# Patient Record
Sex: MALE | Race: WHITE | NOT HISPANIC OR LATINO
[De-identification: names, ages, dates, MRNs, and addresses within clinical notes are randomized per-mention and may not be internally consistent; named-entity substitution may affect disease eponyms.]

---

## 2017-01-12 ENCOUNTER — APPOINTMENT (OUTPATIENT)
Dept: INTERNAL MEDICINE | Facility: CLINIC | Age: 67
End: 2017-01-12

## 2017-01-12 VITALS
DIASTOLIC BLOOD PRESSURE: 80 MMHG | BODY MASS INDEX: 24.01 KG/M2 | WEIGHT: 182 LBS | TEMPERATURE: 98.4 F | SYSTOLIC BLOOD PRESSURE: 122 MMHG | HEART RATE: 84 BPM

## 2017-01-13 ENCOUNTER — APPOINTMENT (OUTPATIENT)
Dept: DERMATOLOGY | Facility: CLINIC | Age: 67
End: 2017-01-13

## 2017-01-13 ENCOUNTER — LABORATORY RESULT (OUTPATIENT)
Age: 67
End: 2017-01-13

## 2017-01-13 VITALS
HEART RATE: 74 BPM | SYSTOLIC BLOOD PRESSURE: 142 MMHG | HEIGHT: 73 IN | WEIGHT: 180 LBS | BODY MASS INDEX: 23.86 KG/M2 | OXYGEN SATURATION: 97 % | TEMPERATURE: 98 F | DIASTOLIC BLOOD PRESSURE: 74 MMHG

## 2017-01-13 DIAGNOSIS — D48.9 NEOPLASM OF UNCERTAIN BEHAVIOR, UNSPECIFIED: ICD-10-CM

## 2017-02-14 ENCOUNTER — APPOINTMENT (OUTPATIENT)
Dept: INTERNAL MEDICINE | Facility: CLINIC | Age: 67
End: 2017-02-14

## 2017-02-14 VITALS
HEIGHT: 73 IN | DIASTOLIC BLOOD PRESSURE: 62 MMHG | SYSTOLIC BLOOD PRESSURE: 118 MMHG | TEMPERATURE: 98 F | WEIGHT: 178 LBS | BODY MASS INDEX: 23.59 KG/M2 | HEART RATE: 72 BPM

## 2017-02-14 DIAGNOSIS — Z87.2 PERSONAL HISTORY OF DISEASES OF THE SKIN AND SUBCUTANEOUS TISSUE: ICD-10-CM

## 2017-04-06 ENCOUNTER — RESULT REVIEW (OUTPATIENT)
Age: 67
End: 2017-04-06

## 2017-04-06 ENCOUNTER — LABORATORY RESULT (OUTPATIENT)
Age: 67
End: 2017-04-06

## 2017-04-07 ENCOUNTER — APPOINTMENT (OUTPATIENT)
Dept: DERMATOLOGY | Facility: CLINIC | Age: 67
End: 2017-04-07

## 2017-04-07 VITALS
SYSTOLIC BLOOD PRESSURE: 132 MMHG | DIASTOLIC BLOOD PRESSURE: 72 MMHG | BODY MASS INDEX: 23.59 KG/M2 | HEIGHT: 73 IN | WEIGHT: 178 LBS

## 2017-04-25 ENCOUNTER — APPOINTMENT (OUTPATIENT)
Dept: INTERNAL MEDICINE | Facility: CLINIC | Age: 67
End: 2017-04-25

## 2017-04-25 VITALS
SYSTOLIC BLOOD PRESSURE: 112 MMHG | TEMPERATURE: 98.5 F | DIASTOLIC BLOOD PRESSURE: 76 MMHG | WEIGHT: 188 LBS | BODY MASS INDEX: 24.8 KG/M2 | HEART RATE: 70 BPM

## 2017-04-25 LAB
BASOPHILS # BLD AUTO: 0.03 K/UL
BASOPHILS NFR BLD AUTO: 0.3 %
EOSINOPHIL # BLD AUTO: 0.12 K/UL
EOSINOPHIL NFR BLD AUTO: 1.2 %
HBA1C MFR BLD HPLC: 6.3 %
HCT VFR BLD CALC: 48.8 %
HGB BLD-MCNC: 16 G/DL
IMM GRANULOCYTES NFR BLD AUTO: 0.3 %
LYMPHOCYTES # BLD AUTO: 1.69 K/UL
LYMPHOCYTES NFR BLD AUTO: 16.9 %
MAN DIFF?: NORMAL
MCHC RBC-ENTMCNC: 29.6 PG
MCHC RBC-ENTMCNC: 32.8 GM/DL
MCV RBC AUTO: 90.2 FL
MONOCYTES # BLD AUTO: 0.8 K/UL
MONOCYTES NFR BLD AUTO: 8 %
NEUTROPHILS # BLD AUTO: 7.35 K/UL
NEUTROPHILS NFR BLD AUTO: 73.3 %
PLATELET # BLD AUTO: 231 K/UL
RBC # BLD: 5.41 M/UL
RBC # FLD: 14.6 %
WBC # FLD AUTO: 10.02 K/UL

## 2017-04-26 LAB
ALBUMIN SERPL ELPH-MCNC: 4.4 G/DL
ALP BLD-CCNC: 123 U/L
ALT SERPL-CCNC: 16 U/L
ANION GAP SERPL CALC-SCNC: 15 MMOL/L
AST SERPL-CCNC: 24 U/L
BILIRUB SERPL-MCNC: 0.4 MG/DL
BUN SERPL-MCNC: 15 MG/DL
CALCIUM SERPL-MCNC: 9.4 MG/DL
CHLORIDE SERPL-SCNC: 102 MMOL/L
CHOLEST SERPL-MCNC: 218 MG/DL
CHOLEST/HDLC SERPL: 3.3 RATIO
CO2 SERPL-SCNC: 25 MMOL/L
CREAT SERPL-MCNC: 1.23 MG/DL
FRUCTOSAMINE SERPL-MCNC: 246 UMOL/L
GLUCOSE SERPL-MCNC: 107 MG/DL
HDLC SERPL-MCNC: 67 MG/DL
LDLC SERPL CALC-MCNC: 128 MG/DL
MEV IGG FLD QL IA: >300 AU/ML
MEV IGG+IGM SER-IMP: POSITIVE
MUV AB SER-ACNC: POSITIVE
MUV IGG SER QL IA: 47.4 AU/ML
POTASSIUM SERPL-SCNC: 5 MMOL/L
PROLACTIN SERPL-MCNC: 4.8 NG/ML
PROT SERPL-MCNC: 7.3 G/DL
RUBV IGG FLD-ACNC: <0.1 INDEX
RUBV IGG SER-IMP: NEGATIVE
SODIUM SERPL-SCNC: 142 MMOL/L
TRIGL SERPL-MCNC: 117 MG/DL
VZV AB TITR SER: POSITIVE
VZV IGG SER IF-ACNC: 2446 INDEX

## 2017-04-28 LAB
ADJUSTED MITOGEN: >10 IU/ML
ADJUSTED TB AG: 0 IU/ML
M TB IFN-G BLD-IMP: NEGATIVE
QUANTIFERON GOLD NIL: 0.02 IU/ML

## 2017-06-14 ENCOUNTER — APPOINTMENT (OUTPATIENT)
Dept: INTERNAL MEDICINE | Facility: CLINIC | Age: 67
End: 2017-06-14

## 2017-06-14 VITALS
HEART RATE: 68 BPM | SYSTOLIC BLOOD PRESSURE: 118 MMHG | WEIGHT: 178 LBS | HEIGHT: 73 IN | TEMPERATURE: 98.8 F | BODY MASS INDEX: 23.59 KG/M2 | DIASTOLIC BLOOD PRESSURE: 78 MMHG

## 2017-07-14 ENCOUNTER — APPOINTMENT (OUTPATIENT)
Dept: DERMATOLOGY | Facility: CLINIC | Age: 67
End: 2017-07-14

## 2017-07-27 ENCOUNTER — APPOINTMENT (OUTPATIENT)
Dept: RHEUMATOLOGY | Facility: CLINIC | Age: 67
End: 2017-07-27

## 2017-09-11 ENCOUNTER — RX RENEWAL (OUTPATIENT)
Age: 67
End: 2017-09-11

## 2017-09-13 ENCOUNTER — APPOINTMENT (OUTPATIENT)
Dept: INTERNAL MEDICINE | Facility: CLINIC | Age: 67
End: 2017-09-13
Payer: MEDICARE

## 2017-09-13 VITALS
HEIGHT: 73 IN | DIASTOLIC BLOOD PRESSURE: 70 MMHG | WEIGHT: 185 LBS | SYSTOLIC BLOOD PRESSURE: 130 MMHG | OXYGEN SATURATION: 97 % | BODY MASS INDEX: 24.52 KG/M2 | TEMPERATURE: 98 F | HEART RATE: 73 BPM

## 2017-09-13 PROCEDURE — 99214 OFFICE O/P EST MOD 30 MIN: CPT

## 2017-10-12 ENCOUNTER — APPOINTMENT (OUTPATIENT)
Dept: DERMATOLOGY | Facility: CLINIC | Age: 67
End: 2017-10-12
Payer: MEDICARE

## 2017-10-12 VITALS
TEMPERATURE: 98.1 F | DIASTOLIC BLOOD PRESSURE: 84 MMHG | HEIGHT: 73 IN | HEART RATE: 62 BPM | BODY MASS INDEX: 24.92 KG/M2 | SYSTOLIC BLOOD PRESSURE: 157 MMHG | WEIGHT: 188 LBS | OXYGEN SATURATION: 97 %

## 2017-10-12 DIAGNOSIS — D22.9 MELANOCYTIC NEVI, UNSPECIFIED: ICD-10-CM

## 2017-10-12 PROCEDURE — 99213 OFFICE O/P EST LOW 20 MIN: CPT

## 2017-10-12 RX ORDER — CLOBETASOL PROPIONATE 0.5 MG/G
0.05 CREAM TOPICAL
Qty: 1 | Refills: 2 | Status: DISCONTINUED | COMMUNITY
Start: 2017-01-13 | End: 2017-10-12

## 2017-10-12 RX ORDER — CLOBETASOL PROPIONATE 0.5 MG/ML
0.05 SOLUTION TOPICAL
Qty: 1 | Refills: 3 | Status: DISCONTINUED | COMMUNITY
Start: 2017-01-13 | End: 2017-10-12

## 2017-12-04 ENCOUNTER — APPOINTMENT (OUTPATIENT)
Dept: INTERNAL MEDICINE | Facility: CLINIC | Age: 67
End: 2017-12-04
Payer: MEDICARE

## 2017-12-04 VITALS
SYSTOLIC BLOOD PRESSURE: 110 MMHG | TEMPERATURE: 97.6 F | WEIGHT: 183 LBS | DIASTOLIC BLOOD PRESSURE: 70 MMHG | HEIGHT: 73 IN | OXYGEN SATURATION: 97 % | HEART RATE: 72 BPM | BODY MASS INDEX: 24.25 KG/M2

## 2017-12-04 PROCEDURE — 82270 OCCULT BLOOD FECES: CPT

## 2017-12-04 PROCEDURE — 99214 OFFICE O/P EST MOD 30 MIN: CPT | Mod: 25

## 2017-12-04 PROCEDURE — 93000 ELECTROCARDIOGRAM COMPLETE: CPT | Mod: 59

## 2017-12-04 PROCEDURE — G0439: CPT

## 2017-12-04 PROCEDURE — 69209 REMOVE IMPACTED EAR WAX UNI: CPT

## 2017-12-05 ENCOUNTER — TRANSCRIPTION ENCOUNTER (OUTPATIENT)
Age: 67
End: 2017-12-05

## 2017-12-05 LAB
25(OH)D3 SERPL-MCNC: 27.8 NG/ML
ALBUMIN SERPL ELPH-MCNC: 4.8 G/DL
ALP BLD-CCNC: 113 U/L
ALT SERPL-CCNC: 20 U/L
ANION GAP SERPL CALC-SCNC: 14 MMOL/L
APPEARANCE: ABNORMAL
AST SERPL-CCNC: 20 U/L
BACTERIA: NEGATIVE
BASOPHILS # BLD AUTO: 0.02 K/UL
BASOPHILS NFR BLD AUTO: 0.2 %
BILIRUB SERPL-MCNC: 0.6 MG/DL
BILIRUBIN URINE: NEGATIVE
BLOOD URINE: NEGATIVE
BUN SERPL-MCNC: 16 MG/DL
CALCIUM SERPL-MCNC: 9.8 MG/DL
CHLORIDE SERPL-SCNC: 101 MMOL/L
CHOLEST SERPL-MCNC: 243 MG/DL
CHOLEST/HDLC SERPL: 3.6 RATIO
CO2 SERPL-SCNC: 26 MMOL/L
COLOR: YELLOW
CREAT SERPL-MCNC: 1.23 MG/DL
CREAT SPEC-SCNC: 180 MG/DL
EOSINOPHIL # BLD AUTO: 0.11 K/UL
EOSINOPHIL NFR BLD AUTO: 1.3 %
FOLATE SERPL-MCNC: 8.7 NG/ML
FRUCTOSAMINE SERPL-MCNC: 259 UMOL/L
GLUCOSE QUALITATIVE U: NEGATIVE MG/DL
GLUCOSE SERPL-MCNC: 126 MG/DL
HBA1C MFR BLD HPLC: 6 %
HCT VFR BLD CALC: 49 %
HDLC SERPL-MCNC: 67 MG/DL
HGB BLD-MCNC: 17.1 G/DL
HYALINE CASTS: 2 /LPF
IMM GRANULOCYTES NFR BLD AUTO: 0.2 %
KETONES URINE: NEGATIVE
LDLC SERPL CALC-MCNC: 144 MG/DL
LEUKOCYTE ESTERASE URINE: NEGATIVE
LYMPHOCYTES # BLD AUTO: 1.82 K/UL
LYMPHOCYTES NFR BLD AUTO: 21.6 %
MAN DIFF?: NORMAL
MCHC RBC-ENTMCNC: 32.1 PG
MCHC RBC-ENTMCNC: 34.9 GM/DL
MCV RBC AUTO: 91.9 FL
MICROALBUMIN 24H UR DL<=1MG/L-MCNC: 2.1 MG/DL
MICROALBUMIN/CREAT 24H UR-RTO: 12 MG/G
MICROSCOPIC-UA: NORMAL
MONOCYTES # BLD AUTO: 0.67 K/UL
MONOCYTES NFR BLD AUTO: 7.9 %
NEUTROPHILS # BLD AUTO: 5.79 K/UL
NEUTROPHILS NFR BLD AUTO: 68.8 %
NITRITE URINE: NEGATIVE
PH URINE: 5.5
PLATELET # BLD AUTO: 174 K/UL
POTASSIUM SERPL-SCNC: 4.2 MMOL/L
PROLACTIN SERPL-MCNC: 6 NG/ML
PROT SERPL-MCNC: 7.8 G/DL
PROTEIN URINE: 30 MG/DL
PSA SERPL-MCNC: 2.64 NG/ML
RBC # BLD: 5.33 M/UL
RBC # FLD: 14.9 %
RED BLOOD CELLS URINE: 4 /HPF
SODIUM SERPL-SCNC: 141 MMOL/L
SPECIFIC GRAVITY URINE: 1.02
SQUAMOUS EPITHELIAL CELLS: 1 /HPF
TRIGL SERPL-MCNC: 159 MG/DL
UROBILINOGEN URINE: NEGATIVE MG/DL
VIT B12 SERPL-MCNC: 292 PG/ML
WBC # FLD AUTO: 8.43 K/UL
WHITE BLOOD CELLS URINE: 2 /HPF

## 2017-12-10 LAB — VIT B1 SERPL-MCNC: 172.9 NMOL/L

## 2017-12-19 ENCOUNTER — APPOINTMENT (OUTPATIENT)
Dept: INTERNAL MEDICINE | Facility: CLINIC | Age: 67
End: 2017-12-19
Payer: MEDICARE

## 2017-12-19 VITALS
SYSTOLIC BLOOD PRESSURE: 108 MMHG | DIASTOLIC BLOOD PRESSURE: 66 MMHG | TEMPERATURE: 97.4 F | HEART RATE: 68 BPM | BODY MASS INDEX: 24.52 KG/M2 | HEIGHT: 73 IN | WEIGHT: 185 LBS | OXYGEN SATURATION: 98 %

## 2017-12-19 PROCEDURE — 99214 OFFICE O/P EST MOD 30 MIN: CPT | Mod: 25

## 2017-12-19 PROCEDURE — 96372 THER/PROPH/DIAG INJ SC/IM: CPT

## 2017-12-19 RX ORDER — CYANOCOBALAMIN 1000 UG/ML
1000 INJECTION INTRAMUSCULAR; SUBCUTANEOUS
Qty: 0 | Refills: 0 | Status: COMPLETED | OUTPATIENT
Start: 2017-12-19

## 2017-12-19 RX ADMIN — CYANOCOBALAMIN 0 MCG/ML: 1000 INJECTION INTRAMUSCULAR; SUBCUTANEOUS at 00:00

## 2017-12-28 ENCOUNTER — APPOINTMENT (OUTPATIENT)
Dept: INTERNAL MEDICINE | Facility: CLINIC | Age: 67
End: 2017-12-28
Payer: MEDICARE

## 2017-12-28 VITALS
TEMPERATURE: 97.7 F | BODY MASS INDEX: 24.52 KG/M2 | SYSTOLIC BLOOD PRESSURE: 110 MMHG | HEIGHT: 73 IN | WEIGHT: 185 LBS | DIASTOLIC BLOOD PRESSURE: 72 MMHG | HEART RATE: 64 BPM | OXYGEN SATURATION: 98 %

## 2017-12-28 PROCEDURE — 96372 THER/PROPH/DIAG INJ SC/IM: CPT

## 2017-12-28 RX ORDER — CYANOCOBALAMIN 1000 UG/ML
1000 INJECTION INTRAMUSCULAR; SUBCUTANEOUS
Qty: 0 | Refills: 0 | Status: COMPLETED | OUTPATIENT
Start: 2017-12-28

## 2017-12-28 RX ADMIN — CYANOCOBALAMIN 0 MCG/ML: 1000 INJECTION INTRAMUSCULAR; SUBCUTANEOUS at 00:00

## 2018-01-03 ENCOUNTER — APPOINTMENT (OUTPATIENT)
Dept: INTERNAL MEDICINE | Facility: CLINIC | Age: 68
End: 2018-01-03
Payer: MEDICARE

## 2018-01-03 VITALS
HEIGHT: 73 IN | WEIGHT: 188 LBS | TEMPERATURE: 97.7 F | DIASTOLIC BLOOD PRESSURE: 60 MMHG | OXYGEN SATURATION: 96 % | SYSTOLIC BLOOD PRESSURE: 110 MMHG | HEART RATE: 63 BPM | BODY MASS INDEX: 24.92 KG/M2

## 2018-01-03 PROCEDURE — 96372 THER/PROPH/DIAG INJ SC/IM: CPT

## 2018-01-03 RX ORDER — CYANOCOBALAMIN 1000 UG/ML
1000 INJECTION INTRAMUSCULAR; SUBCUTANEOUS
Qty: 0 | Refills: 0 | Status: COMPLETED | OUTPATIENT
Start: 2018-01-03

## 2018-01-03 RX ADMIN — CYANOCOBALAMIN 0 MCG/ML: 1000 INJECTION INTRAMUSCULAR; SUBCUTANEOUS at 00:00

## 2018-01-10 ENCOUNTER — APPOINTMENT (OUTPATIENT)
Dept: INTERNAL MEDICINE | Facility: CLINIC | Age: 68
End: 2018-01-10

## 2018-01-18 ENCOUNTER — APPOINTMENT (OUTPATIENT)
Dept: DERMATOLOGY | Facility: CLINIC | Age: 68
End: 2018-01-18
Payer: MEDICARE

## 2018-01-18 VITALS
DIASTOLIC BLOOD PRESSURE: 85 MMHG | SYSTOLIC BLOOD PRESSURE: 151 MMHG | HEIGHT: 73 IN | OXYGEN SATURATION: 97 % | TEMPERATURE: 97.9 F | HEART RATE: 64 BPM | WEIGHT: 193 LBS | BODY MASS INDEX: 25.58 KG/M2

## 2018-01-18 PROCEDURE — 99213 OFFICE O/P EST LOW 20 MIN: CPT

## 2018-01-18 RX ORDER — HALOBETASOL PROPIONATE 0.5 MG/G
0.05 OINTMENT TOPICAL TWICE DAILY
Qty: 2 | Refills: 3 | Status: DISCONTINUED | COMMUNITY
Start: 2017-10-12 | End: 2018-01-18

## 2018-01-22 ENCOUNTER — APPOINTMENT (OUTPATIENT)
Dept: UROLOGY | Facility: CLINIC | Age: 68
End: 2018-01-22
Payer: MEDICARE

## 2018-01-22 VITALS
TEMPERATURE: 98.1 F | HEART RATE: 78 BPM | HEIGHT: 73 IN | BODY MASS INDEX: 25.58 KG/M2 | SYSTOLIC BLOOD PRESSURE: 122 MMHG | WEIGHT: 193 LBS | RESPIRATION RATE: 16 BRPM | DIASTOLIC BLOOD PRESSURE: 80 MMHG

## 2018-01-22 PROCEDURE — 99203 OFFICE O/P NEW LOW 30 MIN: CPT

## 2018-01-22 PROCEDURE — 51798 US URINE CAPACITY MEASURE: CPT

## 2018-01-23 ENCOUNTER — APPOINTMENT (OUTPATIENT)
Dept: INTERNAL MEDICINE | Facility: CLINIC | Age: 68
End: 2018-01-23
Payer: MEDICARE

## 2018-01-23 VITALS
OXYGEN SATURATION: 98 % | HEIGHT: 73 IN | BODY MASS INDEX: 24.65 KG/M2 | TEMPERATURE: 97.5 F | WEIGHT: 186 LBS | HEART RATE: 63 BPM | DIASTOLIC BLOOD PRESSURE: 70 MMHG | SYSTOLIC BLOOD PRESSURE: 124 MMHG

## 2018-01-23 PROCEDURE — 96372 THER/PROPH/DIAG INJ SC/IM: CPT

## 2018-01-23 PROCEDURE — 99214 OFFICE O/P EST MOD 30 MIN: CPT | Mod: 25

## 2018-01-23 RX ORDER — CYANOCOBALAMIN 1000 UG/ML
1000 INJECTION INTRAMUSCULAR; SUBCUTANEOUS
Qty: 0 | Refills: 0 | Status: COMPLETED | OUTPATIENT
Start: 2018-01-23

## 2018-01-23 RX ADMIN — CYANOCOBALAMIN 0 MCG/ML: 1000 INJECTION INTRAMUSCULAR; SUBCUTANEOUS at 00:00

## 2018-01-25 LAB
APPEARANCE: ABNORMAL
BACTERIA UR CULT: NORMAL
BACTERIA: NEGATIVE
BILIRUBIN URINE: NEGATIVE
BLOOD URINE: NEGATIVE
CALCIUM OXALATE CRYSTALS: ABNORMAL
COLOR: YELLOW
GLUCOSE QUALITATIVE U: NEGATIVE MG/DL
KETONES URINE: ABNORMAL
LEUKOCYTE ESTERASE URINE: NEGATIVE
MICROSCOPIC-UA: NORMAL
NITRITE URINE: NEGATIVE
PH URINE: 5
PROTEIN URINE: NEGATIVE MG/DL
RED BLOOD CELLS URINE: 1 /HPF
SPECIFIC GRAVITY URINE: 1.02
SQUAMOUS EPITHELIAL CELLS: 1 /HPF
URINE COMMENTS: NORMAL
UROBILINOGEN URINE: NEGATIVE MG/DL
WHITE BLOOD CELLS URINE: 1 /HPF

## 2018-01-30 ENCOUNTER — FORM ENCOUNTER (OUTPATIENT)
Age: 68
End: 2018-01-30

## 2018-01-31 ENCOUNTER — OUTPATIENT (OUTPATIENT)
Dept: OUTPATIENT SERVICES | Facility: HOSPITAL | Age: 68
LOS: 1 days | End: 2018-01-31
Payer: MEDICARE

## 2018-01-31 ENCOUNTER — APPOINTMENT (OUTPATIENT)
Dept: ULTRASOUND IMAGING | Facility: HOSPITAL | Age: 68
End: 2018-01-31
Payer: MEDICARE

## 2018-01-31 DIAGNOSIS — K74.0 HEPATIC FIBROSIS: ICD-10-CM

## 2018-01-31 PROCEDURE — 76700 US EXAM ABDOM COMPLETE: CPT | Mod: 26

## 2018-01-31 PROCEDURE — 71046 X-RAY EXAM CHEST 2 VIEWS: CPT

## 2018-01-31 PROCEDURE — 71046 X-RAY EXAM CHEST 2 VIEWS: CPT | Mod: 26

## 2018-01-31 PROCEDURE — 76700 US EXAM ABDOM COMPLETE: CPT

## 2018-03-21 ENCOUNTER — APPOINTMENT (OUTPATIENT)
Dept: INTERNAL MEDICINE | Facility: CLINIC | Age: 68
End: 2018-03-21
Payer: MEDICARE

## 2018-03-21 VITALS
HEIGHT: 73 IN | WEIGHT: 194 LBS | DIASTOLIC BLOOD PRESSURE: 76 MMHG | TEMPERATURE: 97.6 F | BODY MASS INDEX: 25.71 KG/M2 | HEART RATE: 65 BPM | OXYGEN SATURATION: 98 % | SYSTOLIC BLOOD PRESSURE: 120 MMHG

## 2018-03-21 DIAGNOSIS — Z79.899 OTHER LONG TERM (CURRENT) DRUG THERAPY: ICD-10-CM

## 2018-03-21 PROCEDURE — 99214 OFFICE O/P EST MOD 30 MIN: CPT

## 2018-04-19 ENCOUNTER — APPOINTMENT (OUTPATIENT)
Dept: DERMATOLOGY | Facility: CLINIC | Age: 68
End: 2018-04-19
Payer: MEDICARE

## 2018-04-19 VITALS
BODY MASS INDEX: 24.52 KG/M2 | WEIGHT: 185 LBS | HEART RATE: 57 BPM | OXYGEN SATURATION: 94 % | DIASTOLIC BLOOD PRESSURE: 78 MMHG | SYSTOLIC BLOOD PRESSURE: 146 MMHG | HEIGHT: 73 IN

## 2018-04-19 DIAGNOSIS — L90.5 SCAR CONDITIONS AND FIBROSIS OF SKIN: ICD-10-CM

## 2018-04-19 PROCEDURE — 99213 OFFICE O/P EST LOW 20 MIN: CPT | Mod: 25

## 2018-04-19 PROCEDURE — 17000 DESTRUCT PREMALG LESION: CPT

## 2018-05-29 ENCOUNTER — APPOINTMENT (OUTPATIENT)
Dept: CARDIOLOGY | Facility: CLINIC | Age: 68
End: 2018-05-29
Payer: MEDICARE

## 2018-05-29 VITALS
OXYGEN SATURATION: 96 % | WEIGHT: 185 LBS | SYSTOLIC BLOOD PRESSURE: 142 MMHG | HEART RATE: 72 BPM | TEMPERATURE: 98.1 F | HEIGHT: 73 IN | BODY MASS INDEX: 24.52 KG/M2 | DIASTOLIC BLOOD PRESSURE: 88 MMHG

## 2018-05-29 PROCEDURE — 99204 OFFICE O/P NEW MOD 45 MIN: CPT

## 2018-05-29 PROCEDURE — 93000 ELECTROCARDIOGRAM COMPLETE: CPT

## 2018-06-20 ENCOUNTER — APPOINTMENT (OUTPATIENT)
Dept: INTERNAL MEDICINE | Facility: CLINIC | Age: 68
End: 2018-06-20
Payer: MEDICARE

## 2018-06-20 VITALS
HEART RATE: 76 BPM | SYSTOLIC BLOOD PRESSURE: 142 MMHG | HEIGHT: 73 IN | TEMPERATURE: 97.8 F | BODY MASS INDEX: 25.05 KG/M2 | WEIGHT: 189 LBS | OXYGEN SATURATION: 96 % | DIASTOLIC BLOOD PRESSURE: 76 MMHG

## 2018-06-20 VITALS — SYSTOLIC BLOOD PRESSURE: 130 MMHG | DIASTOLIC BLOOD PRESSURE: 70 MMHG

## 2018-06-20 PROCEDURE — 99214 OFFICE O/P EST MOD 30 MIN: CPT

## 2018-06-20 NOTE — HISTORY OF PRESENT ILLNESS
[FreeTextEntry1] : psoriasis and hypertension [de-identified] : pt has seen dermatologist and cardiologist and i appreciate their notes .  pt has been having hypertension and is not following low salt diet.  he doesn’t have headaches, dizziness , chest pain , sob, or palpitation.  His psoriasis is well controlled.

## 2018-06-20 NOTE — ASSESSMENT
[FreeTextEntry1] : htn low salt diet.  \par DASH stands for Dietary Approaches to Stop Hypertension. The DASH diet can help lower high blood pressure and cholesterol and other fats in your blood. It can help lower your risk for heart attack and stroke and help you lose weight. This diet is low in sodium (salt) and rich in nutrients.\par \par \par \par \par \par How DASH Works\par \par \par \par \par \par \par The DASH diet reduces high blood pressure by lowering the amount of sodium in your diet to 2300 milligrams (mg) a day. Lowering sodium to 1500 mg a day reduces blood pressure even more. It also includes variety of foods rich in nutrients that help lower blood pressure, such as potassium, calcium, and magnesium.\par \par On the DASH diet, you will:\par •Get plenty of vegetables, fruits, and fat-free or low-fat dairy\par •Include whole grains, beans, seeds, nuts, and vegetable oils\par •Eat lean meats, poultry, and fish\par •Cut back on salt, red meat, sweets, and sugary drinks\par •Limit alcoholic beverages \par \par You should also get at least 30 minutes of moderate-intensity exercise most days of the week. Examples include brisk walking or riding a bike. Aim to get 2 hours and 30 minutes of exercise per week.\par \par You can follow the DASH diet if you want to prevent high blood pressure. It can also help you lose extra weight. Most people can benefit from lowering sodium intake to 2300 milligrams (mg) a day.\par \par Your health care provider may suggest cutting back to 1500 mg a day if you:\par •Already have high blood pressure\par •Have diabetes or chronic kidney disease\par •Are \par •Are age 51 or older \par \par If you take medicine to treat high blood pressure, do not stop taking your medicine while on the DASH diet. Be sure to tell your provider you are following the DASH diet.\par \par \par \par \par \par How to get Started\par \par \par \par \par \par \par On the DASH diet, you can eat foods from all food groups. But you eat more of the foods that are naturally low in salt, cholesterol, and saturated fats. You will also include foods that are high in potassium, calcium, magnesium, and fiber.\par \par Here's a list of the food groups and how many servings of each you should have per day. For a diet that has 2000 calories per day, you should eat:\par •Vegetables (4 to 5 servings a day)\par •Fruits (4 to 5 servings a day)\par •Low-fat or fat-free dairy products, such as milk and yogurt (2 to 3 servings a day)\par •Whole grains (7 to 8 servings a day, and 3 should be whole grains)\par •Fish, lean meats, and poultry (2 servings or less a day)\par •Beans, seeds, and nuts (4 to 5 servings a week)\par •Fats and oils (2 to 3 servings a day)\par •Sweets or added sugars, such as jelly, hard candy, maple syrup, sorbet, and sugar (fewer than 5 servings a week) \par \par The number of servings you have each day depends on how many calories you need.\par •If you're trying to lose weight, you may need fewer servings than listed.\par •If you are not very active, aim for the lower number of servings listed.\par •If you are moderately active, have the higher number of servings.\par •If you are very active, you may need more servings than listed.\par \par Your provider can help find the right number of servings a day for you.\par \par \par \par \par \par Know your Serving Sizes\par \par \par \par \par \par \par To know how much to eat, you need to know serving sizes. Below are sample servings for each food group.\par \par Vegetables:\par •1 cup (70 grams) raw leafy vegetables\par •½ cup (90 grams) chopped raw or cooked vegetables \par \par Fruits:\par •1 medium fruit (6 ounces or 168 grams)\par •½ cup (70 grams) fresh, frozen, or canned fruit\par •¼ cup (25 grams) dried fruit \par \par Fat-free or low-fat dairy products:\par •1 cup (240 milliliters) milk or yogurt\par •1½ ounce (oz) or 50 grams (g) cheese \par \par Whole grains (Aim to make all of your grain choices whole grain. Whole grain products contain more fiber and protein than "refined" grain products.):\par •1 slice bread\par •½ cup (80 grams) cooked rice, pasta, or cereal \par \par Lean meats, poultry, and fish:\par •3 oz (85 g) of cooked fish, lean meat, or poultry \par \par Nuts, seeds, and legumes:\par •½ cup (90 grams) cooked legumes (dried beans, peas)\par •1/3 cup (45 grams) nuts\par •1 tablespoon (10 grams) seeds \par \par Fats and oils:\par •1 teaspoon (5 milliliters) vegetable oil\par •2 tablespoons (30 grams) low-fat salad dressing\par •1 teaspoon (5 grams) soft margarine \par \par Sweets and added sugars:\par •1 tablespoon (15 grams) sugar\par •1 tablespoon (15 grams) jelly or jam\par •½ cup (70 grams) sorbet, gelatin dessert \par \par \par \par \par \par Tips for Following DASH\par \par \par \par \par \par \par It's easy to follow the DASH diet. But it might mean making some changes to how you currently eat. To get started:\par •DO NOT try to make changes all at once. It's fine to change your eating habits gradually.\par •To add vegetables to your diet, try having a salad at lunch. Or, add cucumber, lettuce, shredded carrots, or tomatoes to your sandwiches.\par •There should always been something green on your plate. It's fine to use frozen vegetables instead of fresh. Just make sure the package does not contain added salt or fat.\par •Add sliced fruit to your cereal or oatmeal for breakfast.\par •For dessert, choose fresh fruit or low-fat frozen yogurt instead of high-calorie sweets, such as cakes or pies.\par •Choose healthy snacks, such as unsalted rice cakes or popcorn, raw vegetables, or yogurt. Dried fruits, seeds, and nuts also make great snack choices. Just keep these portions small.\par •Think of meat as part of your meal, instead of the main course. Limit your servings of lean meat to 6 ounces (170 grams) a day. You can have two 3-ounce (85 grams) servings during the day.\par •Try cooking without meat at least twice each week. Instead, eat beans, nuts, tofu, or eggs for your protein.\par \par \par \par \par \par Tips to Lower Your Salt\par \par \par \par \par \par \par To lower the amount of salt in your diet:\par •Take the saltshaker off the table.\par •Flavor your food with herbs and spices instead of salt. Lemon, lime, and vinegar also add flavor.\par •Avoid canned foods and frozen entrees. They are often high in salt. When you make things from scratch you have more control over how much salt goes in them.\par •Check all food labels for sodium. You may be surprised at how much you find, and where you find it. Frozen dinners, soups, salad dressings, and prepared foods often have a lot of sodium.\par •Choose foods that contain less than 5% for the daily value of sodium.\par •Look for low-sodium versions of foods when you can find them.\par •Limit foods and condiments that have a lot of salt, such as pickles, olives, cured meats, ketchup, soy sauce, mustard, and barbeque sauce.\par •When dining out, ask that your food be made with no added salt or MSG. \par \par \par \par \par \par Where to get More Information\par \par \par \par \par \par \par There are many books about the DASH diet plan to help you get started. These books can also provide sample meal plans and recipe ideas.\par dm - will recheck his levels  he also has proteinuria and will repeat.   hyperlipidemia will recheck .  Psoriasis marked improvement.

## 2018-06-20 NOTE — PHYSICAL EXAM
[No Acute Distress] : no acute distress [Well Nourished] : well nourished [Well Developed] : well developed [Well-Appearing] : well-appearing [Normal Sclera/Conjunctiva] : normal sclera/conjunctiva [PERRL] : pupils equal round and reactive to light [EOMI] : extraocular movements intact [Normal Outer Ear/Nose] : the outer ears and nose were normal in appearance [Normal Oropharynx] : the oropharynx was normal [No JVD] : no jugular venous distention [Supple] : supple [No Lymphadenopathy] : no lymphadenopathy [No Respiratory Distress] : no respiratory distress  [Clear to Auscultation] : lungs were clear to auscultation bilaterally [No Accessory Muscle Use] : no accessory muscle use [Normal Rate] : normal rate  [Regular Rhythm] : with a regular rhythm [Normal S1, S2] : normal S1 and S2 [No Carotid Bruits] : no carotid bruits [No Abdominal Bruit] : a ~M bruit was not heard ~T in the abdomen [No Varicosities] : no varicosities [Pedal Pulses Present] : the pedal pulses are present [No Edema] : there was no peripheral edema [No Extremity Clubbing/Cyanosis] : no extremity clubbing/cyanosis [No Palpable Aorta] : no palpable aorta [Soft] : abdomen soft [Non Tender] : non-tender [Non-distended] : non-distended [No Masses] : no abdominal mass palpated [No HSM] : no HSM [Normal Bowel Sounds] : normal bowel sounds [Normal Posterior Cervical Nodes] : no posterior cervical lymphadenopathy [Normal Anterior Cervical Nodes] : no anterior cervical lymphadenopathy [No CVA Tenderness] : no CVA  tenderness [No Spinal Tenderness] : no spinal tenderness [No Joint Swelling] : no joint swelling [Grossly Normal Strength/Tone] : grossly normal strength/tone [Normal Gait] : normal gait [Coordination Grossly Intact] : coordination grossly intact [No Focal Deficits] : no focal deficits [Deep Tendon Reflexes (DTR)] : deep tendon reflexes were 2+ and symmetric [Normal Affect] : the affect was normal [Normal Insight/Judgement] : insight and judgment were intact [de-identified] : marked improvement of skin lesions  of psoriasis .

## 2018-06-20 NOTE — COUNSELING
[Healthy eating counseling provided] : healthy eating [Low Fat Diet] : Low fat diet [Low Salt Diet] : Low salt diet [___ min/wk activity recommended] : [unfilled] min/wk activity recommended [Sleep ___ hours/day] : Sleep [unfilled] hours/day [None] : None [Good understanding] : Patient has a good understanding of lifestyle changes and the steps needed to achieve self management goals

## 2018-06-21 LAB
25(OH)D3 SERPL-MCNC: 28.7 NG/ML
ALBUMIN SERPL ELPH-MCNC: 4.5 G/DL
ALP BLD-CCNC: 85 U/L
ALT SERPL-CCNC: 15 U/L
ANION GAP SERPL CALC-SCNC: 15 MMOL/L
APPEARANCE: CLEAR
AST SERPL-CCNC: 19 U/L
BACTERIA: NEGATIVE
BASOPHILS # BLD AUTO: 0.01 K/UL
BASOPHILS NFR BLD AUTO: 0.1 %
BILIRUB SERPL-MCNC: 0.6 MG/DL
BILIRUBIN URINE: NEGATIVE
BLOOD URINE: NEGATIVE
BUN SERPL-MCNC: 14 MG/DL
CALCIUM SERPL-MCNC: 9.6 MG/DL
CHLORIDE SERPL-SCNC: 105 MMOL/L
CHOLEST SERPL-MCNC: 220 MG/DL
CHOLEST/HDLC SERPL: 3.8 RATIO
CO2 SERPL-SCNC: 23 MMOL/L
COLOR: YELLOW
CREAT SERPL-MCNC: 1.36 MG/DL
CRP SERPL-MCNC: 0.3 MG/DL
EOSINOPHIL # BLD AUTO: 0.1 K/UL
EOSINOPHIL NFR BLD AUTO: 1.3 %
ERYTHROCYTE [SEDIMENTATION RATE] IN BLOOD BY WESTERGREN METHOD: 5 MM/HR
FOLATE SERPL-MCNC: 5.5 NG/ML
FRUCTOSAMINE SERPL-MCNC: 238 UMOL/L
GLUCOSE QUALITATIVE U: NEGATIVE MG/DL
GLUCOSE SERPL-MCNC: 118 MG/DL
HBA1C MFR BLD HPLC: 6.3 %
HCT VFR BLD CALC: 51.1 %
HDLC SERPL-MCNC: 58 MG/DL
HGB BLD-MCNC: 16.3 G/DL
HYALINE CASTS: 1 /LPF
IMM GRANULOCYTES NFR BLD AUTO: 0.3 %
KETONES URINE: NEGATIVE
LDLC SERPL CALC-MCNC: 135 MG/DL
LEUKOCYTE ESTERASE URINE: NEGATIVE
LYMPHOCYTES # BLD AUTO: 1.83 K/UL
LYMPHOCYTES NFR BLD AUTO: 23.2 %
MAN DIFF?: NORMAL
MCHC RBC-ENTMCNC: 30.2 PG
MCHC RBC-ENTMCNC: 31.9 GM/DL
MCV RBC AUTO: 94.6 FL
MICROSCOPIC-UA: NORMAL
MONOCYTES # BLD AUTO: 0.65 K/UL
MONOCYTES NFR BLD AUTO: 8.2 %
NEUTROPHILS # BLD AUTO: 5.28 K/UL
NEUTROPHILS NFR BLD AUTO: 66.9 %
NITRITE URINE: NEGATIVE
PH URINE: 5
PLATELET # BLD AUTO: 163 K/UL
POTASSIUM SERPL-SCNC: 5 MMOL/L
PROT SERPL-MCNC: 7.4 G/DL
PROTEIN URINE: NEGATIVE MG/DL
RBC # BLD: 5.4 M/UL
RBC # FLD: 15 %
RED BLOOD CELLS URINE: 2 /HPF
SODIUM SERPL-SCNC: 143 MMOL/L
SPECIFIC GRAVITY URINE: 1.02
SQUAMOUS EPITHELIAL CELLS: 0 /HPF
TRIGL SERPL-MCNC: 134 MG/DL
UROBILINOGEN URINE: NEGATIVE MG/DL
VIT B12 SERPL-MCNC: 391 PG/ML
WBC # FLD AUTO: 7.89 K/UL
WHITE BLOOD CELLS URINE: 1 /HPF

## 2018-07-31 ENCOUNTER — APPOINTMENT (OUTPATIENT)
Dept: DERMATOLOGY | Facility: CLINIC | Age: 68
End: 2018-07-31

## 2018-09-20 ENCOUNTER — APPOINTMENT (OUTPATIENT)
Dept: INTERNAL MEDICINE | Facility: CLINIC | Age: 68
End: 2018-09-20
Payer: MEDICARE

## 2018-09-20 VITALS
HEART RATE: 62 BPM | DIASTOLIC BLOOD PRESSURE: 70 MMHG | SYSTOLIC BLOOD PRESSURE: 120 MMHG | OXYGEN SATURATION: 97 % | BODY MASS INDEX: 24.78 KG/M2 | HEIGHT: 73 IN | WEIGHT: 187 LBS | TEMPERATURE: 97.8 F

## 2018-09-20 DIAGNOSIS — Z87.448 PERSONAL HISTORY OF OTHER DISEASES OF URINARY SYSTEM: ICD-10-CM

## 2018-09-20 PROCEDURE — 99214 OFFICE O/P EST MOD 30 MIN: CPT

## 2018-09-20 RX ORDER — SECUKINUMAB 150 MG/ML
150 INJECTION SUBCUTANEOUS
Qty: 1 | Refills: 0 | Status: COMPLETED | COMMUNITY
Start: 2017-09-13 | End: 2018-09-20

## 2018-09-20 NOTE — COUNSELING
[Healthy eating counseling provided] : healthy eating [Low Salt Diet] : Low salt diet [Walking] : Walking [Sleep ___ hours/day] : Sleep [unfilled] hours/day [Engage in a relaxing activity] : Engage in a relaxing activity [Plan in advance] : Plan in advance [None] : None [Good understanding] : Patient has a good understanding of lifestyle changes and the steps needed to achieve self management goals

## 2018-09-20 NOTE — PHYSICAL EXAM

## 2018-09-20 NOTE — ASSESSMENT
[FreeTextEntry1] : renal insuff Pt has not gone for his sonogram or nephrologist if his creatinine is high he will go .  He has changed h is salt intake and his bp has been well controlled.  he is drinking enough fluids.  He has lost 2 lbs and eating healthy.   He will have platelets checked and b12 and vit d.  dm controlled will check his blood today.

## 2018-09-20 NOTE — HISTORY OF PRESENT ILLNESS
[FreeTextEntry1] : hypertension psoriasis  [de-identified] : Pt is doing well on medication and his kin has improved  he is following his low salt diet.  he has decreased his drinking alcohol.  He doesn’t have chest pain sob, headaches, or dizziness.

## 2018-09-20 NOTE — HEALTH RISK ASSESSMENT
[] : No [No falls in past year] : Patient reported no falls in the past year [0] : 2) Feeling down, depressed, or hopeless: Not at all (0) [[id number]] : 0

## 2018-09-21 LAB
25(OH)D3 SERPL-MCNC: 28.9 NG/ML
ALBUMIN SERPL ELPH-MCNC: 4.5 G/DL
ALP BLD-CCNC: 86 U/L
ALT SERPL-CCNC: 20 U/L
ANION GAP SERPL CALC-SCNC: 13 MMOL/L
APPEARANCE: CLEAR
AST SERPL-CCNC: 22 U/L
BACTERIA: NEGATIVE
BASOPHILS # BLD AUTO: 0.02 K/UL
BASOPHILS NFR BLD AUTO: 0.2 %
BILIRUB SERPL-MCNC: 0.5 MG/DL
BILIRUBIN URINE: NEGATIVE
BLOOD URINE: NEGATIVE
BUN SERPL-MCNC: 17 MG/DL
CALCIUM SERPL-MCNC: 9.7 MG/DL
CHLORIDE SERPL-SCNC: 103 MMOL/L
CHOLEST SERPL-MCNC: 203 MG/DL
CHOLEST/HDLC SERPL: 3.9 RATIO
CO2 SERPL-SCNC: 27 MMOL/L
COLOR: YELLOW
CREAT SERPL-MCNC: 1.21 MG/DL
CREAT SPEC-SCNC: 207 MG/DL
EOSINOPHIL # BLD AUTO: 0.08 K/UL
EOSINOPHIL NFR BLD AUTO: 0.9 %
FOLATE SERPL-MCNC: 7 NG/ML
FRUCTOSAMINE SERPL-MCNC: 246 UMOL/L
GLUCOSE QUALITATIVE U: NEGATIVE MG/DL
GLUCOSE SERPL-MCNC: 116 MG/DL
HBA1C MFR BLD HPLC: 6.3 %
HCT VFR BLD CALC: 49.5 %
HDLC SERPL-MCNC: 52 MG/DL
HGB BLD-MCNC: 16.4 G/DL
HYALINE CASTS: 1 /LPF
IMM GRANULOCYTES NFR BLD AUTO: 0.2 %
KETONES URINE: NEGATIVE
LDLC SERPL CALC-MCNC: 122 MG/DL
LEUKOCYTE ESTERASE URINE: NEGATIVE
LYMPHOCYTES # BLD AUTO: 1.89 K/UL
LYMPHOCYTES NFR BLD AUTO: 21 %
MAN DIFF?: NORMAL
MCHC RBC-ENTMCNC: 30.8 PG
MCHC RBC-ENTMCNC: 33.1 GM/DL
MCV RBC AUTO: 93 FL
MICROALBUMIN 24H UR DL<=1MG/L-MCNC: 2 MG/DL
MICROALBUMIN/CREAT 24H UR-RTO: 10 MG/G
MICROSCOPIC-UA: NORMAL
MONOCYTES # BLD AUTO: 0.64 K/UL
MONOCYTES NFR BLD AUTO: 7.1 %
NEUTROPHILS # BLD AUTO: 6.36 K/UL
NEUTROPHILS NFR BLD AUTO: 70.6 %
NITRITE URINE: NEGATIVE
PH URINE: 5.5
PLATELET # BLD AUTO: 202 K/UL
POTASSIUM SERPL-SCNC: 4.4 MMOL/L
PROT SERPL-MCNC: 7.4 G/DL
PROTEIN URINE: 30 MG/DL
RBC # BLD: 5.32 M/UL
RBC # FLD: 14.2 %
RED BLOOD CELLS URINE: 3 /HPF
SODIUM SERPL-SCNC: 143 MMOL/L
SPECIFIC GRAVITY URINE: 1.02
SQUAMOUS EPITHELIAL CELLS: 1 /HPF
TRIGL SERPL-MCNC: 145 MG/DL
UROBILINOGEN URINE: NEGATIVE MG/DL
VIT B12 SERPL-MCNC: 397 PG/ML
WBC # FLD AUTO: 9.01 K/UL
WHITE BLOOD CELLS URINE: 1 /HPF

## 2018-09-25 ENCOUNTER — APPOINTMENT (OUTPATIENT)
Dept: DERMATOLOGY | Facility: CLINIC | Age: 68
End: 2018-09-25
Payer: MEDICARE

## 2018-09-25 VITALS
OXYGEN SATURATION: 98 % | BODY MASS INDEX: 24.78 KG/M2 | WEIGHT: 187 LBS | HEIGHT: 73 IN | TEMPERATURE: 98.2 F | HEART RATE: 77 BPM | SYSTOLIC BLOOD PRESSURE: 123 MMHG | DIASTOLIC BLOOD PRESSURE: 79 MMHG

## 2018-09-25 DIAGNOSIS — Z12.83 ENCOUNTER FOR SCREENING FOR MALIGNANT NEOPLASM OF SKIN: ICD-10-CM

## 2018-09-25 DIAGNOSIS — D22.9 MELANOCYTIC NEVI, UNSPECIFIED: ICD-10-CM

## 2018-09-25 PROCEDURE — 99214 OFFICE O/P EST MOD 30 MIN: CPT | Mod: 25

## 2018-09-25 PROCEDURE — 17000 DESTRUCT PREMALG LESION: CPT

## 2018-11-29 ENCOUNTER — APPOINTMENT (OUTPATIENT)
Dept: DERMATOLOGY | Facility: CLINIC | Age: 68
End: 2018-11-29
Payer: MEDICARE

## 2018-11-29 VITALS
TEMPERATURE: 97.8 F | SYSTOLIC BLOOD PRESSURE: 143 MMHG | BODY MASS INDEX: 24.78 KG/M2 | HEART RATE: 74 BPM | DIASTOLIC BLOOD PRESSURE: 70 MMHG | OXYGEN SATURATION: 95 % | WEIGHT: 187 LBS | HEIGHT: 73 IN

## 2018-11-29 PROCEDURE — 99214 OFFICE O/P EST MOD 30 MIN: CPT | Mod: 25,GC

## 2018-11-29 PROCEDURE — 17000 DESTRUCT PREMALG LESION: CPT | Mod: GC

## 2018-11-29 PROCEDURE — 17003 DESTRUCT PREMALG LES 2-14: CPT | Mod: GC

## 2018-12-20 ENCOUNTER — APPOINTMENT (OUTPATIENT)
Dept: INTERNAL MEDICINE | Facility: CLINIC | Age: 68
End: 2018-12-20
Payer: MEDICARE

## 2018-12-20 VITALS
TEMPERATURE: 97.9 F | HEIGHT: 73 IN | DIASTOLIC BLOOD PRESSURE: 72 MMHG | BODY MASS INDEX: 24.78 KG/M2 | OXYGEN SATURATION: 97 % | HEART RATE: 65 BPM | SYSTOLIC BLOOD PRESSURE: 122 MMHG | WEIGHT: 187 LBS

## 2018-12-20 DIAGNOSIS — R29.810 FACIAL WEAKNESS: ICD-10-CM

## 2018-12-20 DIAGNOSIS — Z78.9 OTHER SPECIFIED HEALTH STATUS: ICD-10-CM

## 2018-12-20 PROCEDURE — 99212 OFFICE O/P EST SF 10 MIN: CPT | Mod: 25

## 2018-12-20 PROCEDURE — G0439: CPT

## 2018-12-20 PROCEDURE — 69209 REMOVE IMPACTED EAR WAX UNI: CPT

## 2018-12-20 PROCEDURE — 82270 OCCULT BLOOD FECES: CPT

## 2018-12-20 NOTE — ASSESSMENT
[FreeTextEntry1] : health maintenance He needs shingles new vaccine and flu.  We discussed vaccines.  BMi 24 continue healthy eating and low salt diet.  He is up to date with colonoscopy . he needs bone density fu.  he is up to date with eye and dental exam.   basal cell ca resolved abcde of skin care continue fu with dermatologist.  fibrosis of liver fu with hepatologist and fibroscan .  He has bx in past  and was due to methotrexate usage and alcohol use.  he no longer drinks and has been off methotrexate for 4 yrs.  \par DM he has elevated hgbA1c 6.3 % and has improved his diet and no longer diabetic but prediabetic.  Pt adenoma reducing in  size.  continue surveillance.  Bone density needed last 5 yrs ago.  impacted ear wax multiple cleansing after instillation of debrox with removal of wax.  raynauds The Raynaud phenomenon (RP) is an exaggerated vascular response to cold temperature or emotional stress. The phenomenon is manifested clinically by sharply demarcated color changes of the skin of the digits. Abnormal vasoconstriction of digital arteries and cutaneous arterioles due to a local defect in normal vascular responses is thought to underlie the disorder.\par \par RP is considered primary if these symptoms occur alone without evidence of any associated disorder. By comparison, secondary RP refers to the presence of the disorder in association with a related illness, such as systemic lupus erythematosus and systemic sclerosis (SSc; scleroderma).\par \par The clinical manifestations and diagnosis of the Raynaud phenomenon are reviewed here. The pathogenesis and treatment of this disorder are presented separately. (See "Pathogenesis of the Raynaud phenomenon" and "Initial treatment of the Raynaud phenomenon" and "Treatment of the Raynaud phenomenon resistant to initial therapy".)\par \par EPIDEMIOLOGY — Establishing the true prevalence of Raynaud phenomenon (RP) is hampered by the lack of a well-defined reproducible "gold standard" diagnostic test. Cold hands and feet are a normal physiological response to cold exposure that, to preserve heat, cools the surface skin and causes skin color changes; whereas RP is an exaggerated vascular response to cold temperature or emotional stress. Although survey criteria vary, most investigators agree that a history of at least two color changes (pallor and cyanosis) after cold exposure is necessary for a definite diagnosis.\par \par Community-based surveys have been performed to estimate the prevalence of RP in the general population. In these surveys, estimates of the prevalence of RP have ranged from 3 to 20 percent in women and 3 to 14 percent in men.\par \par These relatively wide ranges reflect, in part, the populations that are studied [1-10]:\par \par ?RP is more common in [place] (17 percent) than in the [place] (5 percent) [1].\par \par ?A survey performed by the author in an -American community found a prevalence of 3 percent [7].\par \par ?As reported in a community survey of 1525 participants, the prevalence of primary RP was approximately 11 and 9 percent in women and men, respectively [8].\par \par \par RP is more common among young women, younger age groups, and family members of patients with RP [11]. RP often starts early in life, but the prevalence then appears to remain relatively constant throughout life [12]. A review found a 0.1 to 1 percent prevalence of new-onset RP among individuals over the age of 60 years [13]. Additionally, the prevalence of RP found in a given population is influenced by the climate of the region studied [14].\par \par PRIMARY AND SECONDARY RAYNAUD PHENOMENON — Patients with Raynaud phenomenon (RP) are described as having either a primary or secondary process.\par \par Primary Raynaud phenomenon — Primary RP or idiopathic Raynaud disease are terms to describe those patients without a definable cause for their vascular events. In this setting, RP is considered to be an exaggeration of normal vasoconstriction to cold exposure (see "Pathogenesis of the Raynaud phenomenon"). Most investigators feel the term "disease" is inappropriate and prefer using the term primary RP for otherwise healthy individuals.\par \par Primary RP usually has an age of onset between 15 and 30 years of age, is more common in women, and may occur in multiple family members [11]. Studies of RP in children are limited, but, while primary RP often begins in the teenage years, careful assessment for secondary causes is most important [9,10,15,16]. Although patients with primary RP are generally otherwise healthy, comorbid conditions can occur that may aggravate attacks. These include hypertension, atherosclerosis, cardiovascular disease, and diabetes mellitus.\par \par There are conditions that are associated with RP, but it is unclear if they are pathologically linked, as is seen in various causes of secondary RP. There is a reported increase in prevalence of RP among patients with fibromyalgia syndrome [17,18]. These surveys may have included subjects with secondary disease states. One survey found no association between skin temperature and digital color changes in patients with fibromyalgia, suggesting a unique cause for RP in fibromyalgia compared with patients with primary RP [19].\par \par Similarly, there are several surveys that support an association of migraine headaches and RP [20-23]. However, it is not established that RP and the migraine headache share a common cause or mechanism of vascular dysfunction.\par \par Secondary Raynaud phenomenon — Secondary RP refers to those patients with RP in whom an associated disease or cause may underlie the attacks [24]. Other investigators prefer the term Raynaud syndrome [25].\par \par Since a variety of possible insults can disrupt the normal complex regulation of regional blood flow to the digits and skin, the number of diseases and exposures associated with secondary RP is extensive (table 1):\par \par ?Diseases commonly associated with RP include autoimmune rheumatic diseases such as systemic sclerosis (SSc; scleroderma), systemic lupus erythematosus, mixed connective tissue disease, Sjögren's syndrome and dermatomyositis/polymyositis.\par \par \par ?Various drugs or toxins can also precipitate or exacerbate RP such as amphetamines and chemotherapeutic agents (especially cisplatin and bleomycin) [26].\par \par \par ?Hematologic abnormalities associated with RP include cryoglobulinemia, cold agglutinin disease, paraproteinemia, POEMS (Polyneuropathy, Organomegaly, Endocrinopathy, Monoclonal gammopathy, and Skin changes) syndrome, and cryofibrinogenemia.\par \par \par ?Occupational and environmental causes of RP include vascular trauma (eg, injury to the distal ulnar artery in the hypothenar hammer syndrome) [27-31], the use of vibrating tools [32-35], frostbite [36], and carpal tunnel syndrome [37].\par \par \par ?Hypothyroidism may predispose to RP, and improvement of cold induced vasospasm may occur with thyroid hormone replacement. Vasculitis may cause ischemic digits, but the absence of preceding cold-induced and reversible color changes help distinguish this from true RP.\par \par \par Since primary RP is common in the general population, some reported associations of RP have not been proven. As an example, the prevalence of RP in rheumatoid arthritis is controversial. Some surveys suggest no increase in risk [38,39], but other studies have found that 17 to 22 percent of patients with rheumatoid arthritis had RP [40,41]. Those with RP were more likely to have vasculitis or severe disease [40,41].\par \par The reported frequencies and rates of development of a disease or disorder to which RP is secondary among patients initially thought to have primary RP vary. A meta-analysis of 10 articles including approximately 640 patients diagnosed with primary RP found that 81 (13 percent) patients eventually developed an autoimmune rheumatic disease [42]. In a longitudinal study including 307 patients with RP, the annual incidence of transition from presumed primary RP to secondary RP was 1 percent [43]. Other large cohorts of patients with an initial presentation suggesting primary RP reported that between 16 and 37 percent transition to secondary RP during the follow-up period [44,45].\par \par CLINICAL MANIFESTATIONS — Both primary and secondary Raynaud phenomenon (RP) most often affect the hands [46-49]. Attacks also frequently occur in the toes, but patients are less likely to complain of lower extremity symptoms.\par \par With both primary and secondary RP, a typical episode is characterized by the sudden onset of cold fingers (or toes) in association with sharply demarcated color changes of skin pallor (white attack) due to constricted blood flow, followed by cyanotic skin (blue attack) which indicates tissue hypoxia (picture 1). With rewarming, the ischemic phase (white or blue attack) usually lasts for 15 to 20 minutes. The skin subsequently blushes upon recovery, thereby resulting in the erythema of reperfusion.\par \par A Raynaud attack typically begins in a single finger and then spreads to other digits symmetrically in both hands. The index, middle, and ring finger are the most frequently involved digits, while the thumb is often spared entirely [50]. Involvement of the thumb may indicate a secondary cause of RP [51]. Cutaneous vasospasm is also common at other sites, including the skin of the ears, nose, face, knees, and nipples. A common pathophysiologic feature of affected areas is the presence of thermoregulatory vessels. (See "Common problems of breastfeeding and weaning", section on 'Nipple vasoconstriction'.)\par \par Symptoms of low blood flow and ischemia — The symptoms of RP may include complaints resulting from low blood flow or ischemia. As an example, mild RP may be associated with sensations of pins and needles, numbness and/or clumsiness of the hand, and finger aching. The signs of mild RP should be completely reversible on rewarming or reduction of stress. Less severe disease is most commonly observed in those with primary RP.\par \par Mild to severe symptoms and signs of ischemia can be observed in patients with secondary RP. In severe secondary RP, for example, pain or ulceration of the skin (typically the tips of the fingers and toes) may result from critical tissue ischemia.\par \par Livedo reticularis — During a cold response, patients with RP may exhibit livedo reticularis; this is a violaceous mottling or reticular pattern of the skin of the arms and legs, sometimes with regular unbroken circles. In those with primary RP, this finding is benign and completely reversible with rewarming.\par \par By comparison, nonreversible livedo reticularis may be observed in patients with vasculitis, occlusive vascular disease (eg, due to atheroemboli or thrombosis), or antiphospholipid syndrome. These cutaneous changes may also be associated with irregular broken circles. Due to these differences, some experts advocate use of the term "livedo racemosa" rather than "livedo reticularis" for these cutaneous findings in patients with inflammatory or thrombotic vascular disease [52]. (See "Clinical manifestations of antiphospholipid syndrome" and "Overview of cutaneous lupus erythematosus".)\par \par Provoking factors — Patients with both primary and secondary RP note that exposure to cold temperature triggers RP. More importantly, the provocation occurs during relative shifts from warmer to cooler temperatures. As a result, mild cold exposures such as air conditioning or the cold of the refrigerated food section of the grocery store may cause an attack. Although attacks occur locally in the fingers, a general body chill will also trigger an episode, even if the hands or feet areas are kept warm. To avoid RP, therefore, patients must maintain whole body warmth.\par \par An attack of RP may also occur after stimulation of the sympathetic nervous system (such as emotional stress, sudden startling). It is not uncommon for the clinician to witness a typical attack during the first encounter with a frightened or nervous patient.\par \par Course — Spontaneous remission of primary RP may occur. This was illustrated in a prospective study that surveyed a middle-aged white population over a seven-year period [8]. For those who had, or who developed, primary RP during that time frame, remissions occurred in 64 percent of both women and men. However, some symptoms suggestive of RP continued in about 20 percent of the cases that were classified as a "remission."\par \par By comparison, remission is uncommon in patients with secondary RP.\par \par EVALUATION AND DIAGNOSIS OF RAYNAUD PHENOMENON — Our approach to the evaluation and diagnosis of Raynaud phenomenon (RP) is summarized in an algorithm (algorithm 1).\par \par Our evaluation begins with the following three screening questions for RP [46,49]:\par \par ?Are your fingers unusually sensitive to cold?\par \par ?Do your fingers change color when they are exposed to cold temperatures?\par \par ?Do your fingers turn white, blue, or both?\par \par \par We diagnose RP if the patient has a positive response to all three questions. There are no simple office tests or standard diagnostic criteria that are commonly used to diagnose RP [24,46,53-55]. Attempts to induce an attack, such as a cold water challenge, are NOT recommended since the responses are inconsistent even in those with definite RP.\par \par DISTINGUISHING PRIMARY FROM SECONDARY RP — Although extensive special testing is not always necessary, every patient with a diagnosis of Raynaud phenomenon (RP) should be carefully evaluated to distinguish primary from the secondary disorder.\par \par History and physical examination — The evaluation begins with an attempt to identify a secondary cause of RP (table 1). A careful history should include the age of onset, involved digits, degree of symmetry and severity of the attacks, and the presence of digital ulcerations.\par \par Patients should also be asked about potential exposures or aggravating factors including:\par \par ?Occupational and environmental factors – Polyvinyl chloride, frostbite, hand-arm vibration, hypothenar hammer syndrome\par \par ?Drugs or toxins – Chemotherapeutic agents (especially cisplatin and bleomycin) [26], interferon [56], estrogen, nicotine, narcotics, sympathomimetic agents, ergotamines, clonidine\par \par ?Neurological and/or occupational conditions – Carpal tunnel syndrome\par \par \par It was initially thought that nonselective beta blockers could cause RP [57]. However, subsequent studies suggested that beta blockers did not induce vasoconstriction in patients with RP [58]. The exact impact of beta blockers on RP is still unresolved in that they have intrinsic sympathomimetic activity as well as vasodilatory properties [26].\par \par Other systemic symptoms that may be suggestive of an autoimmune rheumatic disease include fever, arthralgias/arthritis, myalgias, dysphagia, skin rashes, and cardiopulmonary abnormalities. (See 'Secondary Raynaud phenomenon' above.)\par \par A physical examination should include an evaluation for findings suggestive of tissue ischemia (eg, ulcerations) of the hands and feet. Other sites that may be subject to possible cutaneous vasospasm include the skin of the ears, nose, face, knees, and nipples. If sclerodactyly is present, a workup for systemic sclerosis (SSc) should be initiated. A complete physical exam should also include a thorough musculoskeletal and cardiopulmonary evaluation, as possible clues that an underlying autoimmune rheumatic disease may be present.\par \par Diagnostic tools — Nailfold capillaroscopy is the method most commonly used in clinical practice to help distinguish patients with primary RP from those with secondary RP. Other techniques are also available but they are typically used in specialty centers or for research purposes.\par \par Nailfold capillary microscopy — Nailfold capillary microscopy is performed by dropping oil on the periungual area and examining with an ophthalmoscope set at diopter 40 or with a dissecting microscope. A specialized handheld dermatoscope that magnifies a view of the area is also available. High-magnification nailfold videocapillaroscopy is used in some specialty centers [59]. Enlarged or distorted capillary loops and/or dropout or loss of loops suggest an underlying (or an increased likelihood of developing) autoimmune rheumatic disease [42,60-63]. If the enlargement is associated with loss of capillaries, then the patient is more likely to have or develop SSc (picture 2) [61,64-66]. (See "Overview of the clinical manifestations of systemic sclerosis (scleroderma) in adults".)\par \par Other techniques — Other tools used to assess the vascular responses in the digits and skin include thermography, angiography, laser Doppler imaging, and direct measures of skin temperature and local blood flow [67,68]. Nailfold capillaroscopy, thermal imaging, and laser Doppler imaging each independently provide good discrimination between healthy controls and primary RP [69]. The response of the digital systolic blood pressure to cooling is associated with the RP attack frequency and has been used in clinical trials as a measure of RP severity [70,71]. These laboratory-based tools are useful for studying the physiology of RP but may not predict response to therapy or the severity of RP in an ambulatory setting.\par \par Primary Raynaud phenomenon — We diagnose primary RP if the results of a thorough history and physical examination, including nailfold capillary microscopy, do not suggest secondary RP.\par \par A tentative diagnosis of primary RP can also be made if the history and physical examination does not suggest a secondary cause but the clinician cannot perform nailfold capillary microscopy. Among such patients, there is typically no need for further specialized testing. However, we recognize that serological testing such as antinuclear antibody (DIMA) is often done in this clinical setting. A weakly positive DIMA (<1/640) is common in the general population; therefore, a positive test must be interpreted in the context of the entire clinical situation. (See "Measurement and clinical significance of antinuclear antibodies".)\par \par Careful follow-up is necessary to ensure that signs and/or symptoms of secondary causes of RP do not emerge. Most studies find that a transition to a defined secondary autoimmune rheumatic disease usually occurs within two to three years of presentation with RP. Among those initially diagnosed with primary RP, the presence of autoantibodies and/or abnormalities of nailfold capillary microscopy may help predict of the development of secondary RP. The following examples are illustrative [43-45,72-75]:\par \par ?A meta-analysis of 10 articles including approximately 640 patients diagnosed with primary RP found that an abnormal nailfold capillary pattern was the best predictor of an eventual disease transition, which occurred approximately 10.4 years (a range of 0.6 to 27.9 years) after the onset of RP [42].\par \par \par ?A six-year study evaluated the efficacy of DIMA and anticentromere and antitopoisomerase (anti-Scl-70) testing in predicting the subsequent development of an autoimmune rheumatic disease among 85 patients initially referred for RP [73]. Upon initial examination, 30 and 18 of these patients were considered to have primary RP and possible autoimmune rheumatic disease (defined as one suggestive symptom), respectively. Patients were subsequently examined every two years, with blood drawn and the sera stored. Among these individuals, the presence of an DIMA was significantly associated with the eventual development of an autoimmune rheumatic disorder over the six-year follow-up period. In addition, the prognostic value of a positive anticentromere antibody was 60 percent sensitive and 98 percent specific for the development of the CREST syndrome (Calcinosis cutis, Raynaud phenomenon, Esophageal dysmotility, Sclerodactyly, and Telangiectasia) or limited skin variant of scleroderma, and a positive anti-Scl-70 antibody was 38 and 100 percent sensitive and specific, respectively, for the development of SSc or probable SSc. (See "Diagnosis and differential diagnosis of systemic sclerosis (scleroderma) in adults", section on 'Laboratory testing'.)\par \par \par ?The combination of autoantibodies and nailfold capillary microscopy may be more informative than either test alone. In a prospective study of 586 patients with RP who had no known autoimmune rheumatic disease initially, the overall incidence of limited (CREST variant) or diffuse SSc after follow-up of 3197 person years was 13 percent [75]. The presence of one or more SSc-specific autoantibody (anticentromere [anti-CENP-B], anti-Th/To, antitopoisomerase I [Scl-70], and anti-RNA polymerase III) or abnormal nailfold capillary microscopy was found in 47 percent of patients that progressed to definite SSc. In those with both an autoantibody and abnormal nailfold capillary microscopy, the incidence of definite SSc was nearly 80 percent.\par \par \par Secondary Raynaud phenomenon — Patients with a moderate or high clinical suspicion of a secondary cause of RP may have the following clinical features [44,76,77]:\par \par ?Later age of onset (greater than 40 years)\par \par ?History of a known precipitant\par \par ?Male gender\par \par ?Painful severe events with tissue sign of ischemia (ulceration)\par \par ?Asymmetric attacks\par \par ?RP associated with signs or symptoms of another disease\par \par ?Abnormal nailfold capillaries\par \par ?Abnormal laboratory parameters suggesting vascular disease or an autoimmune disorder\par \par ?RP associated with ischemic signs or symptoms proximal to the fingers (such as the hand or arm) or toes (foot or limb)\par \par \par Patients who have symptoms or signs suggestive of a systemic disease, with or without abnormal nailfold capillaries, should undergo special testing. Laboratory testing should be guided by the history and physical examination, and should include an DIMA as well as other specific autoantibody tests depending on the suspected underlying disease. As an example, in a patient with sclerodactyly and digital ulcers, antibodies specific for SSc such as antitopoisomerase I (anti-Scl-70), anticentromere, and anti-RNA polymerase III antibodies should be ordered.\par \par If the initial workup remains negative and the patient has persistent signs or symptoms suggestive of an underlying systemic disease, an additional workup to exclude other diseases such as hypothyroidism, malignancy, cryoglobulinemia, cryofibrinogenemia, or POEMS (Polyneuropathy, Organomegaly, Endocrinopathy, Monoclonal gammopathy, and Skin changes) syndrome is warranted. (See "Diagnosis of and screening for hypothyroidism in nonpregnant adults" and "Preventive care in adults: Recommendations", section on 'Cancer' and "Overview of cryoglobulins and cryoglobulinemia" and "POEMS syndrome".)\par \par Patients who have a history of single-digit or asymmetric attacks, absent pulses, asymmetric blood pressure, or evidence of critical ischemia should undergo vascular studies (eg, arterial Doppler ultrasonography, magnetic resonance angiography, or angiography) to evaluate for conditions causing large-vessel occlusive disease. Disorders leading to vascular compromise with obstruction, such as embolic disease or vasculitis, may mimic RP by triggering vasospasm. (See 'Differential diagnosis' below.)\par \par DIFFERENTIAL DIAGNOSIS — There are several conditions and disorders that can mimic Raynaud phenomenon (RP).\par \par ?Excessive cold sensitivity – Many individuals report excessive cold sensitivity of cold hands and feet when they are exposed to a cold environment, but they do not see skin color changes. In one study, 10 percent of those surveyed had cold sensitivity [78]. This common complaint of cold hands and feet may be a family trait [79]. Cold sensitivity and decreased skin blood flow following cold exposure occurs more commonly in women and in the older population [80].\par \par \par ?External compression of blood vessels – External compression of blood vessels (eg, carrying a heavy parcel) can cause a transient decrease in blood flow, and, as a consequence, a hand and fingers can be temporally numb, pale, and/or cold. Unlike RP, this is not induced by cold or stress and is easily reversed once blood flow is restored.\par \par \par ?Peripheral neuropathy – Peripheral neuropathy can cause cold intolerance with numbness and skin color changes in the hands and feet [81,82]. However, unlike those with RP, patients with peripheral neuropathy may also have distal loss of sensation to pin prick, light touch, cold, and proprioception. (See "Overview of polyneuropathy".)\par \par \par ?Complex regional pain syndrome – Complex regional pain syndrome (CRPS) is a disorder of a body region, usually the extremities, characterized by pain, paresthesia, vasomotor instability, and altered skin temperature [83]. Although patients with CRPS may have vasomotor disturbances that can produce altered color and temperature, this is typically followed by muscle wasting which is not observed in primary RP, and the pain is usually continuous (in RP pain is intermittent). (See "Complex regional pain syndrome in adults: Pathogenesis, clinical manifestations, and diagnosis".)\par \par \par ?Occlusive vascular disease – Occlusive vascular disease (eg, emboli, thrombosis, atherosclerosis, thromboangiitis obliterans) either can cause a cold limb, hand, foot, or digit or can be associated tissue ischemia with secondary vasospasm. Unlike typical RP, symptoms related to occlusive vascular disease are typically asymmetric and often limited to a single digit; vasospasm may be irreversible with critical tissue ischemia; vascular studies may be necessary to identify an occlusive lesion.\par \par \par ?Acrocyanosis – Acrocyanosis is a functional peripheral vascular disorder characterized by symmetrical painless and persistent blue discoloration of the hands or feet, which is often aggravated by cold exposure [84]. Acrocyanosis can be differentiated from RP by the relative persistence of skin color changes, symmetry, and absence of paroxysmal pallor. As with RP, acrocyanosis is aggravated by cold exposure; however, it is also often associated with hyperhidrosis of hands and feet. RP attacks superimposed on acrocyanosis are not uncommon, and it may be difficult to distinguish the two disorders. Unlike RP, acrocyanosis rarely responds to vasodilator therapy.\par \par \par ?Acute idiopathic blue finger – Acute idiopathic blue finger, also known as Achenbach syndrome or paroxysmal finger hematoma, is a rarely reported clinical disorder in which patients present with sudden onset of painful swelling of at least one digit, with an ecchymosis-like discoloration developing on the volar aspect of the finger [85-87]. The blue discoloration usually spares the distal segment of the finger. The clinical course is benign, and symptoms typically resolve without lasting sequelae.\par \par \par ?Erythromelalgia – Erythromelalgia (EM) is an exaggerated blushing of the skin that can mimic the recovery phase or hyperemic phase of RP [88]. In contrast to RP, the vasodilatation associated with EM is triggered by warm temperature and is improved with cold exposure. (See "Erythromelalgia".)\par \par \par SOCIETY GUIDELINE LINKS — Links to society and government-sponsored guidelines from selected countries and regions around the world are provided separately. (See "Society guideline links: Raynaud phenomenon".) \par \par INFORMATION FOR PATIENTS — [facility] offers two types of patient education materials, "The Basics" and "Beyond the Basics." The Basics patient education pieces are written in plain language, at the 5th to 6th grade reading level, and they answer the four or five key questions a patient might have about a given condition. These articles are best for patients who want a general overview and who prefer short, easy-to-read materials. Beyond the Basics patient education pieces are longer, more sophisticated, and more detailed. These articles are written at the 10th to 12th grade reading level and are best for patients who want in-depth information and are comfortable with some medical jargon.\par \par Here are the patient education articles that are relevant to this topic. We encourage you to print or e-mail these topics to your patients. (You can also locate patient education articles on a variety of subjects by searching on "patient info" and the keyword(s) of interest.)\par \par ?Basics topics (see "Patient education: Raynaud disease (The Basics)")\par \par \par ?Beyond the Basics topics (see "Patient education: Raynaud phenomenon (Beyond the Basics)")\par \par \par SUMMARY AND RECOMMENDATIONS\par \par ?Raynaud phenomenon (RP) is an exaggerated vasoconstrictive response to cold temperature and emotional stress. The phenomenon is manifested clinically by sharply demarcated color changes of the skin of the digits (picture 1). Abnormal vasoconstriction of digital arteries and cutaneous arterioles due to a local defect in normal vascular responses is thought to underlie the disorder. (See 'Introduction' above.)\par \par \par ?RP is considered primary if symptoms occur in the absence of any associated disorder. Secondary RP refers to the presence of the disorder in association with a related illness, such as systemic lupus erythematosus or systemic sclerosis (SSc; scleroderma) (table 1). (See 'Primary and secondary Raynaud phenomenon' above.)\par \par \par ?A typical episode of either primary or secondary RP is characterized by the sudden onset of cold fingers (or toes) in association with sharply demarcated color changes of skin pallor (white attack) due to constricted blood flow, followed by cyanotic skin (blue attack) which indicates tissue hypoxia (picture 1). With rewarming, the ischemic phase (white or blue attack) usually lasts for 15 to 20 minutes. The skin subsequently blushes upon recovery, thereby resulting in the erythema of reperfusion. (See 'Clinical manifestations' above.)\par \par \par ?We diagnose RP if the patient has a positive response to all three of the following questions (algorithm 1) (see 'Evaluation and diagnosis of Raynaud phenomenon' above):\par \par \par •Are your fingers unusually sensitive to cold?\par \par •Do your fingers change color when they are exposed to cold temperatures?\par \par •Do your fingers turn white, blue, or both?\par \par \par ?Every patient with a diagnosis of RP should be carefully evaluated to distinguish primary RP from the secondary disorder. We diagnose primary RP if the results of a thorough history and physical examination, including nailfold capillary microscopy, do not suggest secondary RP. There is typically no need for further specialized testing among such patients. (See 'Primary Raynaud phenomenon' above.)\par \par \par ?Patients with a moderate or high clinical suspicion of a secondary cause of RP may have the following clinical features (see 'Secondary Raynaud phenomenon' above):\par \par \par •Later age of onset (greater than 40 years)\par \par •History of a known precipitant\par \par •Male gender\par \par •Painful severe events with tissue sign of ischemia (ulceration)\par \par •Asymmetric attacks\par \par •RP associated with signs or symptoms of another disease\par \par •Abnormal nailfold capillaries\par \par •Abnormal laboratory parameters suggesting vascular disease or an autoimmune disorder\par \par •RP associated with ischemic signs or symptoms proximal to the fingers (such as the hand or arm) or toes (foot or limb)\par \par \par Patients who have such symptoms or signs suggestive of systemic disease, with or without abnormal nailfold capillaries, should undergo special testing. Laboratory testing should be guided by the history and physical examination, and should include an antinuclear antibody (DIMA) as well as other specific autoantibody tests (eg, anticentromere) depending on the suspected underlying disease.\par \par \par ?There are several conditions and disorders that can mimic RP including excessive cold sensitivity, external compression of blood vessels, peripheral neuropathy, complex regional pain syndrome, occlusive vascular disease, acrocyanosis, acute idiopathic blue finger, and erythromelalgia. (See 'Differential diagnosis' above.)\par  i asked him to keep his fingers in gloves in cold weather and wear socks.  He doesn't have symptoms .

## 2018-12-20 NOTE — COUNSELING
[Healthy eating counseling provided] : healthy eating [Activity counseling provided] : activity [Weight Self Once Weekly] : Weight self once weekly [Keep Food Diary] : Keep food diary [Low Fat Diet] : Low fat diet [Decrease Portions] : Decrease food portions [Low Salt Diet] : Low salt diet [___ min/wk activity recommended] : [unfilled] min/wk activity recommended [Walking] : Walking [Sleep ___ hours/day] : Sleep [unfilled] hours/day [Engage in a relaxing activity] : Engage in a relaxing activity [Plan in advance] : Plan in advance [None] : None [Good understanding] : Patient has a good understanding of lifestyle changes and the steps needed to achieve self management goals

## 2018-12-20 NOTE — HISTORY OF PRESENT ILLNESS
[FreeTextEntry1] : cpe [de-identified] : Pt is a 68 yr old man who came for his cpe .  he has psoriatic arthritis on cosentyx  .

## 2018-12-20 NOTE — PHYSICAL EXAM
[Well Developed] : well developed [Well Nourished] : well nourished [Normal Voice Quality] : was normal [Normal Verbal Skills] : the patient had normal verbal communication skills [Normal Nonverbal Skills] : normal nonverbal communication skills were demonstrated [Conjunctiva] : the conjunctiva were normal in both eyes [PERRL] : pupils were equal in size, round, and reactive to light [EOM Intact] : extraocular movements were intact [Normal Outer Ear/Nose] : the outer ears and nose were normal in appearance [Normal Oropharynx] : the oropharynx was normal [Normal TMs] : both tympanic membranes were normal [Normal Nasal Mucosa] : the nasal mucosa was normal [Normal Appearance] : was normal in appearance [Neck Supple] : was supple [No Tracheal Deviation] : the trachea was midline [Enlarged Diffusely] : was not enlarged [JVP Elevated ___cm] : the JVP was not elevated [Rate ___] : at [unfilled] breaths per minute [Normal Rhythm/Effort] : normal respiratory rhythm and effort [Clear Bilaterally] : the lungs were clear to auscultation bilaterally [Normal to Percussion] : the lungs were normal to percussion [Normal Rate] : normal [Heart Rate ___] : [unfilled] bpm [Normal S1] : normal S1 [Normal S2] : normal S2 [S3] : no S3 [S4] : no S4 [No Murmur] : no murmurs heard [No Pitting Edema] : no pitting edema present [No Abnormalities] : the abdominal aorta was not enlarged and no bruit was heard [No Carotid Bruits] : no carotid bruits [Rt] : varicose veins of the right leg noted [Lt] : varicose veins of the left leg noted [Right Carotid Bruit] : no bruit heard over the right carotid [Left Carotid Bruit] : no bruit heard over the left carotid [Right Femoral Bruit] : no bruit heard over the right femoral artery [Left Femoral Bruit] : no bruit heard over the left femoral artery [2+] : left 2+ [Bruit] : no bruit heard [Examination Of The Breasts] : a normal appearance [No Discharge] : no discharge [Soft, Nontender] : the abdomen was soft and nontender [No Mass] : no masses were palpated [No HSM] : no hepatosplenomegaly noted [Abdomen Hernia Umbilical] : an umbilical hernia was present [] : which was reducible [None] : no CVA tenderness [Normal rectal exam] : was normal [Excoriation] : no perianal excoriation [Multiple Sinus Tracts] : no perianal sinus tracts [Internal Hemorrhoid] : no internal hemorrhoids were present [External Hemorrhoid] : no external hemorrhoids were present [Tender, Swollen] : that was nontender and non-swollen [Occult Blood Positive] : was negative for occult blood [Gross Blood] : no gross blood [Stool Sample Taken] : a stool sample was obtained [Fecal Impaction] : no fecal impaction was present [Urethral Meatus] : meatus normal [Penis Abnormality] : normal circumcised penis [Urinary Bladder Findings] : the bladder was normal on palpation [Scrotum] : the scrotum was normal [Testes Tenderness] : no tenderness of the testes [Testes Mass (___cm)] : there were no testicular masses [No Prostate Nodules] : no prostate nodules [Normal Posterior Cervical Nodes] : no posterior cervical lymphadenopathy [Normal Anterior Cervical Nodes] : no anterior cervical lymphadenopathy [Postauricular Lymph Nodes Enlarged Bilaterally] : nodes not enlarged [Preauricular Lymph Nodes Enlarged Bilaterally] : nodes not enlarged [Submandibular Lymph Nodes Enlarged Bilaterally] : nodes not enlarged [Suboccipital Lymph Nodes Enlarged Bilaterally] : nodes not enlarged [Submental Lymph Nodes Enlarged] : nodes not enlarged [Cervical Lymph Nodes Enlarged Posterior Bilaterally] : nodes not enlarged [Cervical Lymph Nodes Enlarged Anterior Bilaterally] : nodes not enlarged [Supraclavicular Lymph Nodes Enlarged Bilaterally] : nodes not enlarged [Axillary Lymph Nodes Enlarged Bilaterally] : nodes not enlarged [Epitrochlear Lymph Nodes Enlarged Bilaterally] : nodes not enlarged [Femoral Lymph Nodes Enlarged Bilaterally] : nodes not enlarged [Inguinal Lymph Nodes Enlarged Bilaterally] : nodes not enlarged [No Lymphangitis] : no lymphangitis observed [Normal Kyphosis] : normal kyphosis [No Visual Abnormalities] : no visible abnormalities [Normal Lordosis] : normal lordosis [No Scoliosis] : no scoliosis [No Tenderness to Palpation] : no spine tenderness on palpation [No Masses] : no masses [Full ROM] : full ROM [No Pain with ROM] : no pain with motion in any direction [Intact] : all reflexes within normal limits bilaterally [Normal Station and Gait] : the gait and station were normal [Normal Motor Tone] : the muscle tone was normal [Involuntary Movements] : no involuntary movements were seen [Normal Scalp] : inspection of the scalp showed no abnormalities [Examination Of The Hair] : texture and distribution of hair was normal [Abnormal Color] : normal color and pigmentation [Complexion Fair] : fair complexion [Skin Lesions 1] : no skin lesions were observed [Tattoo - Single] : no tattoos observed [Skin Turgor Decreased] : normal skin turgor [Normal] : the deep tendon reflexes were normal [Normal Mental Status] : the patient's orientation, memory, attention, language and fund of knowledge were normal [Appropriate] : appropriate [Impaired judgment] : intact judgment [Impaired Insight] : intact insight [Comprehensive Foot Exam Normal] : Right and left foot were examined and both feet are normal. No ulcers in either foot. Toes are normal and with full ROM.  Normal tactile sensation with monofilament testing throughout both feet [de-identified] : tongue normal teeth fair repair.  bilateral ear wax impaction [de-identified] : ehile examining he had bluing dicolration of his nails on hands and toes and feet and when IO turned on h eat it returned to normal he had Raynaud's.   [de-identified] : markedly improved psoriasis will very little rash [de-identified] : except for deformities and hammer toes.

## 2018-12-20 NOTE — HEALTH RISK ASSESSMENT
[Excellent] : ~his/her~  mood as  excellent [FreeTextEntry1] : none [] : No [No falls in past year] : Patient reported no falls in the past year [0] : 2) Feeling down, depressed, or hopeless: Not at all (0) [de-identified] : social [Patient reported bone density results were normal] : Patient reported bone density results were normal [Patient reported colonoscopy was normal] : Patient reported colonoscopy was normal [HIV test declined] : HIV test declined [Hepatitis C test offered] : Hepatitis C test offered [Change in mental status noted] : No change in mental status noted [Language] : denies difficulty with language [Behavior] : denies difficulty with behavior [Learning/Retaining New Information] : denies difficulty learning/retaining new information [Handling Complex Tasks] : denies difficulty handling complex tasks [Reasoning] : denies difficulty with reasoning [Spatial Ability and Orientation] : denies difficulty with spatial ability and orientation [None] : None [With Family] : lives with family [# of Members in Household ___] :  household currently consist of [unfilled] member(s) [Retired] : retired [High School] : high school [] :  [# Of Children ___] : has [unfilled] children [Sexually Active] : sexually active [Feels Safe at Home] : Feels safe at home [Fully functional (bathing, dressing, toileting, transferring, walking, feeding)] : Fully functional (bathing, dressing, toileting, transferring, walking, feeding) [Fully functional (using the telephone, shopping, preparing meals, housekeeping, doing laundry, using] : Fully functional and needs no help or supervision to perform IADLs (using the telephone, shopping, preparing meals, housekeeping, doing laundry, using transportation, managing medications and managing finances) [Reports changes in hearing] : Reports no changes in hearing [Reports changes in vision] : Reports no changes in vision [Reports changes in dental health] : Reports no changes in dental health [Smoke Detector] : smoke detector [Carbon Monoxide Detector] : carbon monoxide detector [Guns at Home] : no guns at home [Safety elements used in home] : safety elements used in home [Seat Belt] :  uses seat belt [Sunscreen] : uses sunscreen [Travel to Developing Areas] : does not  travel to developing areas [TB Exposure] : is not being exposed to tuberculosis [Caregiver Concerns] : does not have caregiver concerns [BoneDensityDate] : 2014 [ColonoscopyDate] : 2013 [HepatitisCDate] : 2016 [de-identified] : last eye exam 8/18 [de-identified] : last dental few months ago [Discussed at today's visit] : Advance Directives Discussed at today's visit

## 2018-12-26 LAB
25(OH)D3 SERPL-MCNC: 36.7 NG/ML
ALBUMIN SERPL ELPH-MCNC: 4.6 G/DL
ALP BLD-CCNC: 95 U/L
ALT SERPL-CCNC: 12 U/L
ANA SER IF-ACNC: NEGATIVE
ANION GAP SERPL CALC-SCNC: 12 MMOL/L
APPEARANCE: CLEAR
AST SERPL-CCNC: 18 U/L
BACTERIA: NEGATIVE
BASOPHILS # BLD AUTO: 0.02 K/UL
BASOPHILS NFR BLD AUTO: 0.3 %
BILIRUB SERPL-MCNC: 0.5 MG/DL
BILIRUBIN URINE: NEGATIVE
BLOOD URINE: NEGATIVE
BUN SERPL-MCNC: 16 MG/DL
CALCIUM SERPL-MCNC: 9.3 MG/DL
CHLORIDE SERPL-SCNC: 105 MMOL/L
CHOLEST SERPL-MCNC: 205 MG/DL
CHOLEST/HDLC SERPL: 3.7 RATIO
CO2 SERPL-SCNC: 26 MMOL/L
COLOR: YELLOW
CREAT SERPL-MCNC: 1.2 MG/DL
CREAT SPEC-SCNC: 122 MG/DL
CRP SERPL-MCNC: 0.19 MG/DL
EOSINOPHIL # BLD AUTO: 0.08 K/UL
EOSINOPHIL NFR BLD AUTO: 1.1 %
ERYTHROCYTE [SEDIMENTATION RATE] IN BLOOD BY WESTERGREN METHOD: 3 MM/HR
FRUCTOSAMINE SERPL-MCNC: 243 UMOL/L
GLUCOSE QUALITATIVE U: NEGATIVE MG/DL
GLUCOSE SERPL-MCNC: 123 MG/DL
HBA1C MFR BLD HPLC: 6.6 %
HCT VFR BLD CALC: 48.6 %
HDLC SERPL-MCNC: 56 MG/DL
HGB BLD-MCNC: 16.3 G/DL
HYALINE CASTS: 0 /LPF
IMM GRANULOCYTES NFR BLD AUTO: 0.3 %
KETONES URINE: NEGATIVE
LDLC SERPL CALC-MCNC: 120 MG/DL
LEUKOCYTE ESTERASE URINE: NEGATIVE
LYMPHOCYTES # BLD AUTO: 1.72 K/UL
LYMPHOCYTES NFR BLD AUTO: 22.7 %
M TB IFN-G BLD-IMP: NEGATIVE
MAN DIFF?: NORMAL
MCHC RBC-ENTMCNC: 30.5 PG
MCHC RBC-ENTMCNC: 33.5 GM/DL
MCV RBC AUTO: 91 FL
MICROALBUMIN 24H UR DL<=1MG/L-MCNC: <1.2 MG/DL
MICROALBUMIN/CREAT 24H UR-RTO: NORMAL
MICROSCOPIC-UA: NORMAL
MONOCYTES # BLD AUTO: 0.58 K/UL
MONOCYTES NFR BLD AUTO: 7.7 %
NEUTROPHILS # BLD AUTO: 5.15 K/UL
NEUTROPHILS NFR BLD AUTO: 67.9 %
NITRITE URINE: NEGATIVE
PH URINE: 5.5
PLATELET # BLD AUTO: 173 K/UL
POTASSIUM SERPL-SCNC: 4.3 MMOL/L
PROT SERPL-MCNC: 7.2 G/DL
PROTEIN URINE: NEGATIVE MG/DL
PSA SERPL-MCNC: 2.28 NG/ML
QUANTIFERON TB PLUS MITOGEN MINUS NIL: 7.24 IU/ML
QUANTIFERON TB PLUS NIL: 0.02 IU/ML
QUANTIFERON TB PLUS TB1 MINUS NIL: 0 IU/ML
QUANTIFERON TB PLUS TB2 MINUS NIL: 0 IU/ML
RBC # BLD: 5.34 M/UL
RBC # FLD: 14.1 %
RED BLOOD CELLS URINE: 1 /HPF
SODIUM SERPL-SCNC: 143 MMOL/L
SPECIFIC GRAVITY URINE: 1.02
SQUAMOUS EPITHELIAL CELLS: 0 /HPF
TRIGL SERPL-MCNC: 146 MG/DL
UROBILINOGEN URINE: NEGATIVE MG/DL
WBC # FLD AUTO: 7.57 K/UL
WHITE BLOOD CELLS URINE: 1 /HPF

## 2018-12-30 LAB
APPEARANCE: CLEAR
BACTERIA: NEGATIVE
BILIRUBIN URINE: NEGATIVE
BLOOD URINE: NEGATIVE
COLOR: YELLOW
GLUCOSE QUALITATIVE U: NEGATIVE MG/DL
HYALINE CASTS: 0 /LPF
KETONES URINE: NEGATIVE
LEUKOCYTE ESTERASE URINE: NEGATIVE
MICROSCOPIC-UA: NORMAL
NITRITE URINE: NEGATIVE
PH URINE: 5.5
PROTEIN URINE: NEGATIVE MG/DL
RED BLOOD CELLS URINE: 3 /HPF
SPECIFIC GRAVITY URINE: 1.02
SQUAMOUS EPITHELIAL CELLS: 0 /HPF
UROBILINOGEN URINE: NEGATIVE MG/DL
WHITE BLOOD CELLS URINE: 1 /HPF

## 2018-12-31 ENCOUNTER — OTHER (OUTPATIENT)
Age: 68
End: 2018-12-31

## 2019-01-28 LAB
APPEARANCE: CLEAR
BACTERIA: NEGATIVE
BILIRUBIN URINE: NEGATIVE
BLOOD URINE: NEGATIVE
COLOR: ABNORMAL
GLUCOSE QUALITATIVE U: NEGATIVE MG/DL
HYALINE CASTS: 0 /LPF
KETONES URINE: NEGATIVE
LEUKOCYTE ESTERASE URINE: NEGATIVE
MICROSCOPIC-UA: NORMAL
NITRITE URINE: NEGATIVE
PH URINE: 5.5
PROTEIN URINE: NEGATIVE MG/DL
RED BLOOD CELLS URINE: 2 /HPF
SPECIFIC GRAVITY URINE: 1.02
SQUAMOUS EPITHELIAL CELLS: 0 /HPF
UROBILINOGEN URINE: NEGATIVE MG/DL
WHITE BLOOD CELLS URINE: 1 /HPF

## 2019-03-04 ENCOUNTER — APPOINTMENT (OUTPATIENT)
Dept: HEPATOLOGY | Facility: CLINIC | Age: 69
End: 2019-03-04
Payer: MEDICARE

## 2019-03-04 ENCOUNTER — LABORATORY RESULT (OUTPATIENT)
Age: 69
End: 2019-03-04

## 2019-03-04 VITALS
OXYGEN SATURATION: 96 % | RESPIRATION RATE: 16 BRPM | HEART RATE: 65 BPM | SYSTOLIC BLOOD PRESSURE: 135 MMHG | TEMPERATURE: 98 F | DIASTOLIC BLOOD PRESSURE: 69 MMHG | HEIGHT: 70.5 IN

## 2019-03-04 PROCEDURE — 99213 OFFICE O/P EST LOW 20 MIN: CPT

## 2019-03-04 PROCEDURE — 99203 OFFICE O/P NEW LOW 30 MIN: CPT

## 2019-03-05 LAB
A1AT SERPL-MCNC: 129 MG/DL
AFP-TM SERPL-MCNC: 2.9 NG/ML
ALBUMIN SERPL ELPH-MCNC: 4.6 G/DL
ALP BLD-CCNC: 95 U/L
ALT SERPL-CCNC: 18 U/L
ANION GAP SERPL CALC-SCNC: 14 MMOL/L
AST SERPL-CCNC: 17 U/L
BASOPHILS # BLD AUTO: 0.03 K/UL
BASOPHILS NFR BLD AUTO: 0.4 %
BILIRUB SERPL-MCNC: 0.5 MG/DL
BUN SERPL-MCNC: 18 MG/DL
CALCIUM SERPL-MCNC: 9.3 MG/DL
CARDIOLIPIN AB SER IA-ACNC: NEGATIVE
CERULOPLASMIN SERPL-MCNC: 24 MG/DL
CHLORIDE SERPL-SCNC: 106 MMOL/L
CO2 SERPL-SCNC: 26 MMOL/L
CREAT SERPL-MCNC: 1.29 MG/DL
CRP SERPL-MCNC: 0.19 MG/DL
DEPRECATED KAPPA LC FREE/LAMBDA SER: 0.9 RATIO
EOSINOPHIL # BLD AUTO: 0.07 K/UL
EOSINOPHIL NFR BLD AUTO: 0.9 %
FERRITIN SERPL-MCNC: 106 NG/ML
GLUCOSE SERPL-MCNC: 123 MG/DL
HBV CORE IGG+IGM SER QL: NONREACTIVE
HBV SURFACE AB SER QL: NONREACTIVE
HBV SURFACE AG SER QL: NONREACTIVE
HCT VFR BLD CALC: 50.2 %
HCV AB SER QL: NONREACTIVE
HCV S/CO RATIO: 0.09 S/CO
HGB BLD-MCNC: 16.1 G/DL
IGA SER QL IEP: 122 MG/DL
IGG SER QL IEP: 711 MG/DL
IGM SER QL IEP: 25 MG/DL
IMM GRANULOCYTES NFR BLD AUTO: 0.3 %
IRON SATN MFR SERPL: 32 %
IRON SERPL-MCNC: 114 UG/DL
KAPPA LC CSF-MCNC: 1.34 MG/DL
KAPPA LC SERPL-MCNC: 1.2 MG/DL
LYMPHOCYTES # BLD AUTO: 1.73 K/UL
LYMPHOCYTES NFR BLD AUTO: 22.9 %
MAN DIFF?: NORMAL
MCHC RBC-ENTMCNC: 30.5 PG
MCHC RBC-ENTMCNC: 32.1 GM/DL
MCV RBC AUTO: 95.1 FL
MONOCYTES # BLD AUTO: 0.64 K/UL
MONOCYTES NFR BLD AUTO: 8.5 %
MPO AB + PR3 PNL SER: NORMAL
NEUTROPHILS # BLD AUTO: 5.06 K/UL
NEUTROPHILS NFR BLD AUTO: 67 %
PLATELET # BLD AUTO: 192 K/UL
POTASSIUM SERPL-SCNC: 4.6 MMOL/L
PROT SERPL-MCNC: 6.8 G/DL
RBC # BLD: 5.28 M/UL
RBC # FLD: 14.4 %
SODIUM SERPL-SCNC: 146 MMOL/L
TIBC SERPL-MCNC: 351 UG/DL
TTG IGA SER IA-ACNC: <1.2 U/ML
TTG IGA SER-ACNC: NEGATIVE
UIBC SERPL-MCNC: 237 UG/DL
WBC # FLD AUTO: 7.55 K/UL

## 2019-03-06 LAB
HEPATITIS A IGG ANTIBODY: REACTIVE
LKM AB SER QL IF: <20.1 UNITS
MITOCHONDRIA AB SER IF-ACNC: NORMAL
SMOOTH MUSCLE AB SER QL IF: NORMAL

## 2019-03-07 ENCOUNTER — APPOINTMENT (OUTPATIENT)
Dept: DERMATOLOGY | Facility: CLINIC | Age: 69
End: 2019-03-07
Payer: MEDICARE

## 2019-03-07 VITALS
HEIGHT: 70.5 IN | BODY MASS INDEX: 26.9 KG/M2 | WEIGHT: 190 LBS | DIASTOLIC BLOOD PRESSURE: 71 MMHG | HEART RATE: 55 BPM | TEMPERATURE: 97.8 F | SYSTOLIC BLOOD PRESSURE: 157 MMHG

## 2019-03-07 DIAGNOSIS — L82.0 INFLAMED SEBORRHEIC KERATOSIS: ICD-10-CM

## 2019-03-07 DIAGNOSIS — D22.9 MELANOCYTIC NEVI, UNSPECIFIED: ICD-10-CM

## 2019-03-07 DIAGNOSIS — B07.9 VIRAL WART, UNSPECIFIED: ICD-10-CM

## 2019-03-07 LAB
ANA PAT FLD IF-IMP: ABNORMAL
ANA SER IF-ACNC: ABNORMAL

## 2019-03-07 PROCEDURE — 17110 DESTRUCTION B9 LES UP TO 14: CPT | Mod: 59,GC

## 2019-03-07 PROCEDURE — 17000 DESTRUCT PREMALG LESION: CPT | Mod: GC,59

## 2019-03-07 PROCEDURE — 99214 OFFICE O/P EST MOD 30 MIN: CPT | Mod: 25,GC

## 2019-03-08 LAB — SOLUBLE LIVER IGG SER IA-ACNC: < 20.1 UNITS

## 2019-03-21 ENCOUNTER — APPOINTMENT (OUTPATIENT)
Dept: INTERNAL MEDICINE | Facility: CLINIC | Age: 69
End: 2019-03-21

## 2019-03-21 ENCOUNTER — EMERGENCY (EMERGENCY)
Facility: HOSPITAL | Age: 69
LOS: 1 days | Discharge: HOME | End: 2019-03-21
Attending: EMERGENCY MEDICINE
Payer: MEDICARE

## 2019-03-21 VITALS
DIASTOLIC BLOOD PRESSURE: 69 MMHG | OXYGEN SATURATION: 98 % | WEIGHT: 190.04 LBS | RESPIRATION RATE: 17 BRPM | HEIGHT: 73 IN | SYSTOLIC BLOOD PRESSURE: 127 MMHG | HEART RATE: 69 BPM | TEMPERATURE: 98 F

## 2019-03-21 VITALS
DIASTOLIC BLOOD PRESSURE: 58 MMHG | SYSTOLIC BLOOD PRESSURE: 129 MMHG | OXYGEN SATURATION: 98 % | HEART RATE: 70 BPM | RESPIRATION RATE: 18 BRPM

## 2019-03-21 LAB
ALBUMIN SERPL ELPH-MCNC: 3.8 G/DL — SIGNIFICANT CHANGE UP (ref 3.5–5)
ALP SERPL-CCNC: 96 U/L — SIGNIFICANT CHANGE UP (ref 40–120)
ALT FLD-CCNC: 26 U/L DA — SIGNIFICANT CHANGE UP (ref 10–60)
ANION GAP SERPL CALC-SCNC: 2 MMOL/L — LOW (ref 5–17)
ANION GAP SERPL CALC-SCNC: 4 MMOL/L — LOW (ref 5–17)
APPEARANCE UR: CLEAR — SIGNIFICANT CHANGE UP
AST SERPL-CCNC: 14 U/L — SIGNIFICANT CHANGE UP (ref 10–40)
BILIRUB SERPL-MCNC: 0.4 MG/DL — SIGNIFICANT CHANGE UP (ref 0.2–1.2)
BILIRUB UR-MCNC: NEGATIVE — SIGNIFICANT CHANGE UP
BUN SERPL-MCNC: 17 MG/DL — SIGNIFICANT CHANGE UP (ref 7–18)
BUN SERPL-MCNC: 20 MG/DL — HIGH (ref 7–18)
CALCIUM SERPL-MCNC: 7.8 MG/DL — LOW (ref 8.4–10.5)
CALCIUM SERPL-MCNC: 8.7 MG/DL — SIGNIFICANT CHANGE UP (ref 8.4–10.5)
CHLORIDE SERPL-SCNC: 112 MMOL/L — HIGH (ref 96–108)
CHLORIDE SERPL-SCNC: 114 MMOL/L — HIGH (ref 96–108)
CO2 SERPL-SCNC: 26 MMOL/L — SIGNIFICANT CHANGE UP (ref 22–31)
CO2 SERPL-SCNC: 27 MMOL/L — SIGNIFICANT CHANGE UP (ref 22–31)
COLOR SPEC: YELLOW — SIGNIFICANT CHANGE UP
CREAT SERPL-MCNC: 1.12 MG/DL — SIGNIFICANT CHANGE UP (ref 0.5–1.3)
CREAT SERPL-MCNC: 1.41 MG/DL — HIGH (ref 0.5–1.3)
DIFF PNL FLD: NEGATIVE — SIGNIFICANT CHANGE UP
GLUCOSE SERPL-MCNC: 104 MG/DL — HIGH (ref 70–99)
GLUCOSE SERPL-MCNC: 165 MG/DL — HIGH (ref 70–99)
GLUCOSE UR QL: 50 MG/DL
HCT VFR BLD CALC: 54 % — HIGH (ref 39–50)
HGB BLD-MCNC: 17.5 G/DL — HIGH (ref 13–17)
KETONES UR-MCNC: NEGATIVE — SIGNIFICANT CHANGE UP
LEUKOCYTE ESTERASE UR-ACNC: NEGATIVE — SIGNIFICANT CHANGE UP
LIDOCAIN IGE QN: 209 U/L — SIGNIFICANT CHANGE UP (ref 73–393)
MCHC RBC-ENTMCNC: 30.2 PG — SIGNIFICANT CHANGE UP (ref 27–34)
MCHC RBC-ENTMCNC: 32.4 GM/DL — SIGNIFICANT CHANGE UP (ref 32–36)
MCV RBC AUTO: 93.3 FL — SIGNIFICANT CHANGE UP (ref 80–100)
NITRITE UR-MCNC: NEGATIVE — SIGNIFICANT CHANGE UP
NRBC # BLD: 0 /100 WBCS — SIGNIFICANT CHANGE UP (ref 0–0)
PH UR: 5 — SIGNIFICANT CHANGE UP (ref 5–8)
PLATELET # BLD AUTO: 173 K/UL — SIGNIFICANT CHANGE UP (ref 150–400)
POTASSIUM SERPL-MCNC: 4.1 MMOL/L — SIGNIFICANT CHANGE UP (ref 3.5–5.3)
POTASSIUM SERPL-MCNC: 5.3 MMOL/L — SIGNIFICANT CHANGE UP (ref 3.5–5.3)
POTASSIUM SERPL-SCNC: 4.1 MMOL/L — SIGNIFICANT CHANGE UP (ref 3.5–5.3)
POTASSIUM SERPL-SCNC: 5.3 MMOL/L — SIGNIFICANT CHANGE UP (ref 3.5–5.3)
PROT SERPL-MCNC: 7.4 G/DL — SIGNIFICANT CHANGE UP (ref 6–8.3)
PROT UR-MCNC: 15
RBC # BLD: 5.79 M/UL — SIGNIFICANT CHANGE UP (ref 4.2–5.8)
RBC # FLD: 14.3 % — SIGNIFICANT CHANGE UP (ref 10.3–14.5)
SODIUM SERPL-SCNC: 141 MMOL/L — SIGNIFICANT CHANGE UP (ref 135–145)
SODIUM SERPL-SCNC: 144 MMOL/L — SIGNIFICANT CHANGE UP (ref 135–145)
SP GR SPEC: 1.01 — SIGNIFICANT CHANGE UP (ref 1.01–1.02)
TROPONIN I SERPL-MCNC: <0.015 NG/ML — SIGNIFICANT CHANGE UP (ref 0–0.04)
UROBILINOGEN FLD QL: NEGATIVE — SIGNIFICANT CHANGE UP
WBC # BLD: 9.39 K/UL — SIGNIFICANT CHANGE UP (ref 3.8–10.5)
WBC # FLD AUTO: 9.39 K/UL — SIGNIFICANT CHANGE UP (ref 3.8–10.5)

## 2019-03-21 PROCEDURE — 85027 COMPLETE CBC AUTOMATED: CPT

## 2019-03-21 PROCEDURE — 81001 URINALYSIS AUTO W/SCOPE: CPT

## 2019-03-21 PROCEDURE — 99284 EMERGENCY DEPT VISIT MOD MDM: CPT | Mod: 25

## 2019-03-21 PROCEDURE — 80053 COMPREHEN METABOLIC PANEL: CPT

## 2019-03-21 PROCEDURE — 72170 X-RAY EXAM OF PELVIS: CPT

## 2019-03-21 PROCEDURE — 80048 BASIC METABOLIC PNL TOTAL CA: CPT

## 2019-03-21 PROCEDURE — 71046 X-RAY EXAM CHEST 2 VIEWS: CPT

## 2019-03-21 PROCEDURE — 36415 COLL VENOUS BLD VENIPUNCTURE: CPT

## 2019-03-21 PROCEDURE — 93005 ELECTROCARDIOGRAM TRACING: CPT

## 2019-03-21 PROCEDURE — 83690 ASSAY OF LIPASE: CPT

## 2019-03-21 PROCEDURE — 82962 GLUCOSE BLOOD TEST: CPT

## 2019-03-21 PROCEDURE — 96374 THER/PROPH/DIAG INJ IV PUSH: CPT

## 2019-03-21 PROCEDURE — 71046 X-RAY EXAM CHEST 2 VIEWS: CPT | Mod: 26

## 2019-03-21 PROCEDURE — 72170 X-RAY EXAM OF PELVIS: CPT | Mod: 26

## 2019-03-21 PROCEDURE — 93010 ELECTROCARDIOGRAM REPORT: CPT

## 2019-03-21 PROCEDURE — 84484 ASSAY OF TROPONIN QUANT: CPT

## 2019-03-21 RX ORDER — ONDANSETRON 8 MG/1
4 TABLET, FILM COATED ORAL ONCE
Qty: 0 | Refills: 0 | Status: COMPLETED | OUTPATIENT
Start: 2019-03-21 | End: 2019-03-21

## 2019-03-21 RX ORDER — SODIUM CHLORIDE 9 MG/ML
1000 INJECTION INTRAMUSCULAR; INTRAVENOUS; SUBCUTANEOUS ONCE
Qty: 0 | Refills: 0 | Status: COMPLETED | OUTPATIENT
Start: 2019-03-21 | End: 2019-03-21

## 2019-03-21 RX ORDER — SODIUM CHLORIDE 9 MG/ML
1000 INJECTION INTRAMUSCULAR; INTRAVENOUS; SUBCUTANEOUS ONCE
Qty: 0 | Refills: 0 | Status: DISCONTINUED | OUTPATIENT
Start: 2019-03-21 | End: 2019-03-25

## 2019-03-21 RX ADMIN — SODIUM CHLORIDE 2000 MILLILITER(S): 9 INJECTION INTRAMUSCULAR; INTRAVENOUS; SUBCUTANEOUS at 09:33

## 2019-03-21 RX ADMIN — ONDANSETRON 4 MILLIGRAM(S): 8 TABLET, FILM COATED ORAL at 09:33

## 2019-03-21 NOTE — ED ADULT NURSE NOTE - NSIMPLEMENTINTERV_GEN_ALL_ED
Implemented All Fall Risk Interventions:  [place] to call system. Call bell, personal items and telephone within reach. Instruct patient to call for assistance. Room bathroom lighting operational. Non-slip footwear when patient is off stretcher. Physically safe environment: no spills, clutter or unnecessary equipment. Stretcher in lowest position, wheels locked, appropriate side rails in place. Provide visual cue, wrist band, yellow gown, etc. Monitor gait and stability. Monitor for mental status changes and reorient to person, place, and time. Review medications for side effects contributing to fall risk. Reinforce activity limits and safety measures with patient and family.

## 2019-03-21 NOTE — ED ADULT TRIAGE NOTE - CHIEF COMPLAINT QUOTE
general weakness and chills last night. s/p fall in the bathroom today, states his leg gave out. Denies any LOC or pain.

## 2019-03-21 NOTE — ED PROVIDER NOTE - PHYSICAL EXAMINATION
General: pt lying in stretcher, appears stated age and is not in distress  HEENT: AT/NC, pink conjunctiva, anicteric sclerae, EOMI, PERRLA, TMs smooth, grey, intact, with normal landmarks, nasal mucosa pink, no discharge, turbinates not enlarged; moist mucus membranes, tongue well-papillated, good dentition; posterior pharynx shows no erythema or exudates;   Neck: supple, full ROM, trachea midline, no JVD, no cervical LAD, no midline ttp or stepoffs  Lungs: symmetric excursion, b/l clear vesicular breath sounds with no wheezes, crackles, or rhonchi  Heart: rrr, S1, S2 normal; no S3 or S4; no murmurs or rubs  Abd: normal bowel sounds; soft, nontender; negative McBurney's point tenderness, negative Hardy's sign, no rebound, no guarding, spleen non-palpable; no hepatomegaly, no masses  Back: no midline spinal tenderness or stepoffs, no costovertebral angle tenderness  Extremities: no clubbing, cyanosis, or edema; no palpable deformities or fractures  Skin: good turgor; no rashes, petechiae, ecchymoses, or jaundice  Pulses: radial, posterior tibialis (PT), dorsalis pedis (DP) all 2+ & symmetric  Neuro: awake, alert, responsive; oriented to person, place and time; cranial nerves intact, EOMI, intact jaw movement, intact facial sensation, no facial asymmetry, hearing intact; no nystagmus, tongue midline; Motor: Normal tone in upper and lower extremities bilaterally strength 5/5; Sensory: intact to pinprick and light touch; Cerebellar: finger-to-nose intact; normal steady gait; negative Romberg’s sign; DTR: biceps, triceps, patellar, 2+, no pronator drift General: pt lying in stretcher, appears stated age and is not in distress  HEENT: AT/NC, pink conjunctiva, anicteric sclerae, EOMI, PERRLA, moist mucus membranes, tongue well-papillated, good dentition; posterior pharynx shows no erythema or exudates;   Neck: supple, full ROM, trachea midline, no JVD, no cervical LAD, no midline ttp or stepoffs  Lungs: symmetric excursion, b/l clear vesicular breath sounds with no wheezes, crackles, or rhonchi  Heart: rrr, S1, S2 normal; no S3 or S4; no murmurs or rubs  Abd: normal bowel sounds; soft, nontender; negative McBurney's point tenderness, negative Hardy's sign, no rebound, no guarding, spleen non-palpable; no hepatomegaly, no masses  Back: no midline spinal tenderness or stepoffs, no costovertebral angle tenderness  Extremities: no clubbing, cyanosis, or edema; no palpable deformities or fractures  Skin: good turgor; no rashes, petechiae, ecchymoses, or jaundice  Pulses: radial, posterior tibialis (PT), dorsalis pedis (DP) all 2+ & symmetric  Neuro: awake, alert, responsive; oriented to person, place and time; cranial nerves intact, EOMI, intact jaw movement, intact facial sensation, no facial asymmetry, hearing intact; no nystagmus, tongue midline; Motor: Normal tone in upper and lower extremities bilaterally strength 5/5; Sensory: intact; normal steady gait

## 2019-03-21 NOTE — ED PROVIDER NOTE - CHPI ED SYMPTOMS NEG
no cough/no head trauma, no neck trauma, no LOC, no abdominal pain, no focal weakness/numbness/no shortness of breath/no vomiting

## 2019-03-21 NOTE — ED PROVIDER NOTE - OBJECTIVE STATEMENT
69 y/o M pt with a PMHx of rheumatoid arthritis, presents to the ED s/p episode of pre-syncope and fall prior to arrival. Patient reports he was in his usual state of mind until last night when he developed chills, shortness of breath, fevers and diarrhea. Patient states he woke up this morning and went to use the bathroom when she felt lightheaded and fell. Patient notes he landed on his buttocks and slide down because his legs were weak and gave away from under him. Patient denies head trauma, neck trauma, LOC, vomiting, abdominal pain, cough, respiratory symptoms, shortness of breath, focal weakness/numbness, or any other symptoms. Patient is not on any blood thinners or Asprin. 67 y/o M pt with a PMHx of rheumatoid arthritis, presents to the ED s/p episode of pre-syncope and fall prior to arrival. Patient reports he was in his usual state of mind until last night when he developed chills, shortness of breath, fevers and diarrhea. Patient states he woke up this morning and went to use the bathroom when he felt lightheaded and fell. Patient notes he landed on his buttocks and slid down because his legs were weak and gave away from under him. Patient denies head trauma, neck trauma, LOC, vomiting, abdominal pain, cough, respiratory symptoms, shortness of breath, focal weakness/numbness, or any other symptoms. Patient is not on any blood thinners or Asprin. Of note, patient's grandchildren have had viral gastroenteritis symptoms w/ diarrhea and vomiting.

## 2019-03-21 NOTE — ED PROVIDER NOTE - CLINICAL SUMMARY MEDICAL DECISION MAKING FREE TEXT BOX
Pt w/ aforementioned presentation concerning for but not limited to electrolyte abnormalities versus infection. Pt had no LOC and no head trauma and denies any CP and has no neuro deficits on exam. Likely dehydrated due to viral illness.

## 2019-03-21 NOTE — ED PROVIDER NOTE - CARE PLAN
Principal Discharge DX:	Diarrhea of presumed infectious origin  Secondary Diagnosis:	Near syncope  Secondary Diagnosis:	Dehydration

## 2019-03-22 PROBLEM — M06.9 RHEUMATOID ARTHRITIS, UNSPECIFIED: Chronic | Status: INACTIVE | Noted: 2019-03-21 | Resolved: 2019-03-21

## 2019-03-23 ENCOUNTER — APPOINTMENT (OUTPATIENT)
Dept: MRI IMAGING | Facility: IMAGING CENTER | Age: 69
End: 2019-03-23

## 2019-03-25 PROBLEM — M06.9 RHEUMATOID ARTHRITIS, UNSPECIFIED: Chronic | Status: ACTIVE | Noted: 2019-03-21

## 2019-03-26 ENCOUNTER — APPOINTMENT (OUTPATIENT)
Dept: INTERNAL MEDICINE | Facility: CLINIC | Age: 69
End: 2019-03-26
Payer: MEDICARE

## 2019-03-26 VITALS
BODY MASS INDEX: 26.74 KG/M2 | SYSTOLIC BLOOD PRESSURE: 158 MMHG | OXYGEN SATURATION: 96 % | TEMPERATURE: 97.7 F | DIASTOLIC BLOOD PRESSURE: 76 MMHG | HEART RATE: 59 BPM | WEIGHT: 189 LBS

## 2019-03-26 VITALS — DIASTOLIC BLOOD PRESSURE: 83 MMHG | SYSTOLIC BLOOD PRESSURE: 140 MMHG

## 2019-03-26 PROCEDURE — 99215 OFFICE O/P EST HI 40 MIN: CPT

## 2019-03-26 RX ORDER — COAL TAR 20 %
20 SOLUTION, NON-ORAL TOPICAL
Qty: 1 | Refills: 1 | Status: COMPLETED | COMMUNITY
Start: 2018-01-23 | End: 2019-03-26

## 2019-03-26 RX ORDER — VITAMIN K2 90 MCG
125 MCG CAPSULE ORAL
Qty: 1 | Refills: 2 | Status: COMPLETED | COMMUNITY
Start: 2018-09-21 | End: 2019-03-26

## 2019-03-26 RX ORDER — TRIAMCINOLONE ACETONIDE 1 MG/G
0.1 OINTMENT TOPICAL
Qty: 1 | Refills: 0 | Status: COMPLETED | COMMUNITY
Start: 2017-07-14 | End: 2019-03-26

## 2019-03-26 RX ORDER — TACROLIMUS 1 MG/G
0.1 OINTMENT TOPICAL
Qty: 1 | Refills: 1 | Status: COMPLETED | COMMUNITY
Start: 2018-09-25 | End: 2019-03-26

## 2019-03-26 RX ORDER — SILDENAFIL 20 MG/1
20 TABLET ORAL
Qty: 30 | Refills: 3 | Status: COMPLETED | COMMUNITY
Start: 2018-06-20 | End: 2019-03-26

## 2019-03-26 NOTE — DATA REVIEWED
[FreeTextEntry1] : reviewed labs fro er.   chest xray  stable biapical pleural thickening.   us pelvis neg chlorides 112 bun 20 creta 1.41.  glucose 165 repeats choloride 114 bun 17 cret 1.12 glucose 104  hemoglo 17.5

## 2019-03-26 NOTE — PHYSICAL EXAM
[Well Developed] : well developed [Well Nourished] : well nourished [Normal Voice Quality] : was normal [Normal Sclera/Conjunctiva] : normal sclera/conjunctiva [PERRL] : pupils equal round and reactive to light [EOMI] : extraocular movements intact [Normal Oropharynx] : the oropharynx was normal [Normal TMs] : both tympanic membranes were normal [No JVD] : no jugular venous distention [Supple] : supple [Rate ___] : at [unfilled] breaths per minute [Normal Rhythm/Effort] : normal respiratory rhythm and effort [Clear Bilaterally] : the lungs were clear to auscultation bilaterally [Normal to Percussion] : the lungs were normal to percussion [5th Left ICS - MCL] : palpated at the 5th LICS in the midclavicular line [Normal Rate] : normal [Heart Rate ___] : [unfilled] bpm [Normal S1] : normal S1 [Normal S2] : normal S2 [No Murmur] : no murmurs heard [No Pitting Edema] : no pitting edema present [No Abnormalities] : the abdominal aorta was not enlarged and no bruit was heard [2+] : right 2+ [Soft, Nontender] : the abdomen was soft and nontender [No Mass] : no masses were palpated [No HSM] : no hepatosplenomegaly noted [Normal Posterior Cervical Nodes] : no posterior cervical lymphadenopathy [Normal Anterior Cervical Nodes] : no anterior cervical lymphadenopathy [No CVA Tenderness] : no CVA  tenderness [No Spinal Tenderness] : no spinal tenderness [Normal Station and Gait] : the gait and station were normal [Normal Motor Tone] : the muscle tone was normal [Involuntary Movements] : no involuntary movements were seen [Normal Scalp] : inspection of the scalp showed no abnormalities [Examination Of The Hair] : texture and distribution of hair was normal [Normal] : the deep tendon reflexes were normal [Normal Mental Status] : the patient's orientation, memory, attention, language and fund of knowledge were normal [Appropriate] : appropriate [Comprehensive Foot Exam Normal] : Right and left foot were examined and both feet are normal. No ulcers in either foot. Toes are normal and with full ROM.  Normal tactile sensation with monofilament testing throughout both feet [Normal Verbal Skills] : a deficiency in verbal communication skills was noted [Normal Nonverbal Skills] : deficient nonverbal communication skills were noted [S3] : no S3 [S4] : no S4 [Right Carotid Bruit] : no bruit heard over the right carotid [Left Carotid Bruit] : no bruit heard over the left carotid [Right Femoral Bruit] : no bruit heard over the right femoral artery [Left Femoral Bruit] : no bruit heard over the left femoral artery [Rt] : no varicose veins of the right leg [Lt] : no varicose veins of the left leg [Bruit] : no bruit heard [Impaired judgment] : intact judgment [Impaired Insight] : intact insight

## 2019-03-26 NOTE — COUNSELING
[Healthy eating counseling provided] : healthy eating [Fall prevention counseling provided] : fall prevention  [Sleep ___ hours/day] : Sleep [unfilled] hours/day [Adequate lighting] : Adequate lighting [No throw rugs] : No throw rugs [Use proper foot wear] : Use proper foot wear [Use recommended devices] : Use recommended devices [None] : None [Good understanding] : Patient has a good understanding of lifestyle changes and the steps needed to achieve self management goals

## 2019-03-26 NOTE — ASSESSMENT
[FreeTextEntry1] : syncope Pt likely was dehydrated but has hx of pituitary adenoma and will do fu mri of brain to make sure there are no changes. carotid doppler and cardiology evaluation and also neurological.  I am unsure what had happened but he was dehydrated and could have had also vasovagal response while on br toilet.   he will have blood taken today so I can determine how his diabetes control is  .HPN his bp is elevated today.  he was also noted to have polycythemia but this was likely due to hemoconcentration.  he will have repeat done .  I will also check his b12 .  If he develops any dizziness weakness go to er.    He also had bone density done but I don’t have reports.  HPN low salt diet DASh \par DASH stands for Dietary Approaches to Stop Hypertension. The DASH diet can help lower high blood pressure and cholesterol and other fats in your blood. It can help lower your risk for heart attack and stroke and help you lose weight. This diet is low in sodium (salt) and rich in nutrients.\par \par \par \par \par \par How DASH Works\par \par \par \par \par \par \par The DASH diet reduces high blood pressure by lowering the amount of sodium in your diet to 2300 milligrams (mg) a day. Lowering sodium to 1500 mg a day reduces blood pressure even more. It also includes variety of foods rich in nutrients that help lower blood pressure, such as potassium, calcium, and magnesium.\par \par On the DASH diet, you will:\par •Get plenty of vegetables, fruits, and fat-free or low-fat dairy\par •Include whole grains, beans, seeds, nuts, and vegetable oils\par •Eat lean meats, poultry, and fish\par •Cut back on salt, red meat, sweets, and sugary drinks\par •Limit alcoholic beverages \par \par You should also get at least 30 minutes of moderate-intensity exercise most days of the week. Examples include brisk walking or riding a bike. Aim to get 2 hours and 30 minutes of exercise per week.\par \par You can follow the DASH diet if you want to prevent high blood pressure. It can also help you lose extra weight. Most people can benefit from lowering sodium intake to 2300 milligrams (mg) a day.\par \par Your health care provider may suggest cutting back to 1500 mg a day if you:\par •Already have high blood pressure\par •Have diabetes or chronic kidney disease\par •Are \par •Are age 51 or older \par \par If you take medicine to treat high blood pressure, do not stop taking your medicine while on the DASH diet. Be sure to tell your provider you are following the DASH diet.\par \par \par \par \par \par How to get Started\par \par \par \par \par \par \par On the DASH diet, you can eat foods from all food groups. But you eat more of the foods that are naturally low in salt, cholesterol, and saturated fats. You will also include foods that are high in potassium, calcium, magnesium, and fiber.\par \par Here's a list of the food groups and how many servings of each you should have per day. For a diet that has 2000 calories per day, you should eat:\par •Vegetables (4 to 5 servings a day)\par •Fruits (4 to 5 servings a day)\par •Low-fat or fat-free dairy products, such as milk and yogurt (2 to 3 servings a day)\par •Whole grains (7 to 8 servings a day, and 3 should be whole grains)\par •Fish, lean meats, and poultry (2 servings or less a day)\par •Beans, seeds, and nuts (4 to 5 servings a week)\par •Fats and oils (2 to 3 servings a day)\par •Sweets or added sugars, such as jelly, hard candy, maple syrup, sorbet, and sugar (fewer than 5 servings a week) \par \par The number of servings you have each day depends on how many calories you need.\par •If you're trying to lose weight, you may need fewer servings than listed.\par •If you are not very active, aim for the lower number of servings listed.\par •If you are moderately active, have the higher number of servings.\par •If you are very active, you may need more servings than listed.\par \par Your provider can help find the right number of servings a day for you.\par \par \par \par \par \par Know your Serving Sizes\par \par \par \par \par \par \par To know how much to eat, you need to know serving sizes. Below are sample servings for each food group.\par \par Vegetables:\par •1 cup (70 grams) raw leafy vegetables\par •½ cup (90 grams) chopped raw or cooked vegetables \par \par Fruits:\par •1 medium fruit (6 ounces or 168 grams)\par •½ cup (70 grams) fresh, frozen, or canned fruit\par •¼ cup (25 grams) dried fruit \par \par Fat-free or low-fat dairy products:\par •1 cup (240 milliliters) milk or yogurt\par •1½ ounce (oz) or 50 grams (g) cheese \par \par Whole grains (Aim to make all of your grain choices whole grain. Whole grain products contain more fiber and protein than "refined" grain products.):\par •1 slice bread\par •½ cup (80 grams) cooked rice, pasta, or cereal \par \par Lean meats, poultry, and fish:\par •3 oz (85 g) of cooked fish, lean meat, or poultry \par \par Nuts, seeds, and legumes:\par •½ cup (90 grams) cooked legumes (dried beans, peas)\par •1/3 cup (45 grams) nuts\par •1 tablespoon (10 grams) seeds \par \par Fats and oils:\par •1 teaspoon (5 milliliters) vegetable oil\par •2 tablespoons (30 grams) low-fat salad dressing\par •1 teaspoon (5 grams) soft margarine \par \par Sweets and added sugars:\par •1 tablespoon (15 grams) sugar\par •1 tablespoon (15 grams) jelly or jam\par •½ cup (70 grams) sorbet, gelatin dessert \par \par \par \par \par \par Tips for Following DASH\par \par \par \par \par \par \par It's easy to follow the DASH diet. But it might mean making some changes to how you currently eat. To get started:\par •DO NOT try to make changes all at once. It's fine to change your eating habits gradually.\par •To add vegetables to your diet, try having a salad at lunch. Or, add cucumber, lettuce, shredded carrots, or tomatoes to your sandwiches.\par •There should always been something green on your plate. It's fine to use frozen vegetables instead of fresh. Just make sure the package does not contain added salt or fat.\par •Add sliced fruit to your cereal or oatmeal for breakfast.\par •For dessert, choose fresh fruit or low-fat frozen yogurt instead of high-calorie sweets, such as cakes or pies.\par •Choose healthy snacks, such as unsalted rice cakes or popcorn, raw vegetables, or yogurt. Dried fruits, seeds, and nuts also make great snack choices. Just keep these portions small.\par •Think of meat as part of your meal, instead of the main course. Limit your servings of lean meat to 6 ounces (170 grams) a day. You can have two 3-ounce (85 grams) servings during the day.\par •Try cooking without meat at least twice each week. Instead, eat beans, nuts, tofu, or eggs for your protein.\par \par \par \par \par \par Tips to Lower Your Salt\par \par \par \par \par \par \par To lower the amount of salt in your diet:\par •Take the saltshaker off the table.\par •Flavor your food with herbs and spices instead of salt. Lemon, lime, and vinegar also add flavor.\par •Avoid canned foods and frozen entrees. They are often high in salt. When you make things from scratch you have more control over how much salt goes in them.\par •Check all food labels for sodium. You may be surprised at how much you find, and where you find it. Frozen dinners, soups, salad dressings, and prepared foods often have a lot of sodium.\par •Choose foods that contain less than 5% for the daily value of sodium.\par •Look for low-sodium versions of foods when you can find them.\par •Limit foods and condiments that have a lot of salt, such as pickles, olives, cured meats, ketchup, soy sauce, mustard, and barbeque sauce.\par •When dining out, ask that your food be made with no added salt or MSG. \par  I spent 40 mins face to face with pt

## 2019-03-26 NOTE — HEALTH RISK ASSESSMENT
[One fall no injury in past year] : Patient reported one fall in the past year without injury [0] : 2) Feeling down, depressed, or hopeless: Not at all (0) [] : No [de-identified] : walks [de-identified] : healthy  [[id number]] : 0

## 2019-03-26 NOTE — HISTORY OF PRESENT ILLNESS
[Spouse] : spouse [FreeTextEntry1] : recent er visit.  for syncopy [de-identified] : Pt is a 68 yr old man with psoriatic arthritis who states he was recently in er for a day for diarrhea , abdominal pain and nausea on 3/21/19.   Pt states he went to br to brush his teeth and his wife heard a thump and she rushed to br and found him on floor,  he was not dizzy and thinks he passed out .  He had diarrhea and was on toilet .  he had had diarrhea during the night and chills .  he was to er and had ekg  pelvic sonogram.  he didn’t have ct of head.  he was given hydration and given medication for nausea and diarrhea. His calcium was low.  he didn’t drink alcohol.  he doesn’t have headaches , sob, palpitations , chest pain blurred vision or double vision.

## 2019-03-27 LAB
25(OH)D3 SERPL-MCNC: 40.1 NG/ML
ALBUMIN SERPL ELPH-MCNC: 4.7 G/DL
ALP BLD-CCNC: 88 U/L
ALT SERPL-CCNC: 26 U/L
ANION GAP SERPL CALC-SCNC: 13 MMOL/L
APPEARANCE: CLEAR
AST SERPL-CCNC: 19 U/L
BACTERIA: NEGATIVE
BASOPHILS # BLD AUTO: 0.03 K/UL
BASOPHILS NFR BLD AUTO: 0.4 %
BILIRUB SERPL-MCNC: 0.6 MG/DL
BILIRUBIN URINE: NEGATIVE
BLOOD URINE: NEGATIVE
BUN SERPL-MCNC: 19 MG/DL
CALCIUM OXALATE CRYSTALS: ABNORMAL
CALCIUM SERPL-MCNC: 9.5 MG/DL
CHLORIDE SERPL-SCNC: 105 MMOL/L
CHOLEST SERPL-MCNC: 168 MG/DL
CHOLEST/HDLC SERPL: 4.1 RATIO
CO2 SERPL-SCNC: 27 MMOL/L
COLOR: YELLOW
CREAT SERPL-MCNC: 1.25 MG/DL
EOSINOPHIL # BLD AUTO: 0.11 K/UL
EOSINOPHIL NFR BLD AUTO: 1.4 %
ESTIMATED AVERAGE GLUCOSE: 143 MG/DL
FOLATE SERPL-MCNC: 8.1 NG/ML
FRUCTOSAMINE SERPL-MCNC: 241 UMOL/L
GLUCOSE QUALITATIVE U: NEGATIVE
GLUCOSE SERPL-MCNC: 113 MG/DL
HBA1C MFR BLD HPLC: 6.6 %
HCT VFR BLD CALC: 51.5 %
HDLC SERPL-MCNC: 41 MG/DL
HGB BLD-MCNC: 15.9 G/DL
HYALINE CASTS: 0 /LPF
IMM GRANULOCYTES NFR BLD AUTO: 0.3 %
KETONES URINE: NEGATIVE
LDLC SERPL CALC-MCNC: 106 MG/DL
LEUKOCYTE ESTERASE URINE: NEGATIVE
LYMPHOCYTES # BLD AUTO: 2.1 K/UL
LYMPHOCYTES NFR BLD AUTO: 27.1 %
MAN DIFF?: NORMAL
MCHC RBC-ENTMCNC: 29.6 PG
MCHC RBC-ENTMCNC: 30.9 GM/DL
MCV RBC AUTO: 95.7 FL
MICROSCOPIC-UA: NORMAL
MONOCYTES # BLD AUTO: 0.67 K/UL
MONOCYTES NFR BLD AUTO: 8.7 %
NEUTROPHILS # BLD AUTO: 4.81 K/UL
NEUTROPHILS NFR BLD AUTO: 62.1 %
NITRITE URINE: NEGATIVE
PH URINE: 6
PLATELET # BLD AUTO: 224 K/UL
POTASSIUM SERPL-SCNC: 4.2 MMOL/L
PROT SERPL-MCNC: 6.8 G/DL
PROTEIN URINE: NEGATIVE
RBC # BLD: 5.38 M/UL
RBC # FLD: 14 %
RED BLOOD CELLS URINE: 0 /HPF
SODIUM SERPL-SCNC: 145 MMOL/L
SPECIFIC GRAVITY URINE: 1.02
SQUAMOUS EPITHELIAL CELLS: 0 /HPF
TRIGL SERPL-MCNC: 106 MG/DL
UROBILINOGEN URINE: NORMAL
VIT B12 SERPL-MCNC: 457 PG/ML
WBC # FLD AUTO: 7.74 K/UL
WHITE BLOOD CELLS URINE: 1 /HPF

## 2019-04-12 ENCOUNTER — FORM ENCOUNTER (OUTPATIENT)
Age: 69
End: 2019-04-12

## 2019-04-13 ENCOUNTER — OUTPATIENT (OUTPATIENT)
Dept: OUTPATIENT SERVICES | Facility: HOSPITAL | Age: 69
LOS: 1 days | End: 2019-04-13
Payer: MEDICARE

## 2019-04-13 ENCOUNTER — APPOINTMENT (OUTPATIENT)
Age: 69
End: 2019-04-13
Payer: MEDICARE

## 2019-04-13 DIAGNOSIS — K74.0 HEPATIC FIBROSIS: ICD-10-CM

## 2019-04-13 PROCEDURE — 74183 MRI ABD W/O CNTR FLWD CNTR: CPT | Mod: 26

## 2019-04-13 PROCEDURE — A9585: CPT

## 2019-04-13 PROCEDURE — 74183 MRI ABD W/O CNTR FLWD CNTR: CPT

## 2019-04-17 ENCOUNTER — FORM ENCOUNTER (OUTPATIENT)
Age: 69
End: 2019-04-17

## 2019-04-18 ENCOUNTER — APPOINTMENT (OUTPATIENT)
Dept: ULTRASOUND IMAGING | Facility: CLINIC | Age: 69
End: 2019-04-18
Payer: MEDICARE

## 2019-04-18 ENCOUNTER — OUTPATIENT (OUTPATIENT)
Dept: OUTPATIENT SERVICES | Facility: HOSPITAL | Age: 69
LOS: 1 days | End: 2019-04-18
Payer: MEDICARE

## 2019-04-18 DIAGNOSIS — D35.2 BENIGN NEOPLASM OF PITUITARY GLAND: ICD-10-CM

## 2019-04-18 DIAGNOSIS — R55 SYNCOPE AND COLLAPSE: ICD-10-CM

## 2019-04-18 PROCEDURE — 93880 EXTRACRANIAL BILAT STUDY: CPT

## 2019-04-18 PROCEDURE — 93880 EXTRACRANIAL BILAT STUDY: CPT | Mod: 26

## 2019-04-27 ENCOUNTER — FORM ENCOUNTER (OUTPATIENT)
Age: 69
End: 2019-04-27

## 2019-04-28 ENCOUNTER — APPOINTMENT (OUTPATIENT)
Dept: MRI IMAGING | Facility: IMAGING CENTER | Age: 69
End: 2019-04-28
Payer: MEDICARE

## 2019-04-28 ENCOUNTER — OUTPATIENT (OUTPATIENT)
Dept: OUTPATIENT SERVICES | Facility: HOSPITAL | Age: 69
LOS: 1 days | End: 2019-04-28
Payer: MEDICARE

## 2019-04-28 DIAGNOSIS — D35.2 BENIGN NEOPLASM OF PITUITARY GLAND: ICD-10-CM

## 2019-04-28 DIAGNOSIS — R55 SYNCOPE AND COLLAPSE: ICD-10-CM

## 2019-04-28 PROCEDURE — A9585: CPT

## 2019-04-28 PROCEDURE — 70553 MRI BRAIN STEM W/O & W/DYE: CPT | Mod: 26

## 2019-04-28 PROCEDURE — 70553 MRI BRAIN STEM W/O & W/DYE: CPT

## 2019-05-07 ENCOUNTER — APPOINTMENT (OUTPATIENT)
Dept: INTERNAL MEDICINE | Facility: CLINIC | Age: 69
End: 2019-05-07
Payer: MEDICARE

## 2019-05-07 VITALS
TEMPERATURE: 97.3 F | BODY MASS INDEX: 27.4 KG/M2 | WEIGHT: 191.4 LBS | OXYGEN SATURATION: 97 % | HEART RATE: 70 BPM | HEIGHT: 70 IN | SYSTOLIC BLOOD PRESSURE: 174 MMHG | DIASTOLIC BLOOD PRESSURE: 79 MMHG

## 2019-05-07 VITALS — SYSTOLIC BLOOD PRESSURE: 130 MMHG | DIASTOLIC BLOOD PRESSURE: 70 MMHG

## 2019-05-07 DIAGNOSIS — Z01.818 ENCOUNTER FOR OTHER PREPROCEDURAL EXAMINATION: ICD-10-CM

## 2019-05-07 DIAGNOSIS — M79.673 PAIN IN UNSPECIFIED FOOT: ICD-10-CM

## 2019-05-07 DIAGNOSIS — K74.0 HEPATIC FIBROSIS: ICD-10-CM

## 2019-05-07 PROCEDURE — 99214 OFFICE O/P EST MOD 30 MIN: CPT

## 2019-05-07 NOTE — HEALTH RISK ASSESSMENT
[] : No [No falls in past year] : Patient reported no falls in the past year [0] : 2) Feeling down, depressed, or hopeless: Not at all (0) [de-identified] : none  [de-identified] : healthy  [[id number]] : 0

## 2019-05-07 NOTE — PHYSICAL EXAM
[No Acute Distress] : no acute distress [Well Nourished] : well nourished [Well Developed] : well developed [Normal Sclera/Conjunctiva] : normal sclera/conjunctiva [Well-Appearing] : well-appearing [PERRL] : pupils equal round and reactive to light [EOMI] : extraocular movements intact [Normal Outer Ear/Nose] : the outer ears and nose were normal in appearance [Normal Oropharynx] : the oropharynx was normal [No JVD] : no jugular venous distention [No Lymphadenopathy] : no lymphadenopathy [Supple] : supple [No Respiratory Distress] : no respiratory distress  [Clear to Auscultation] : lungs were clear to auscultation bilaterally [Normal Rate] : normal rate  [No Accessory Muscle Use] : no accessory muscle use [Normal S1, S2] : normal S1 and S2 [Regular Rhythm] : with a regular rhythm [No Varicosities] : no varicosities [No Edema] : there was no peripheral edema [No Extremity Clubbing/Cyanosis] : no extremity clubbing/cyanosis [No Palpable Aorta] : no palpable aorta [Soft] : abdomen soft [Non Tender] : non-tender [No Masses] : no abdominal mass palpated [Non-distended] : non-distended [Normal Bowel Sounds] : normal bowel sounds [No HSM] : no HSM [Normal Posterior Cervical Nodes] : no posterior cervical lymphadenopathy [Normal Anterior Cervical Nodes] : no anterior cervical lymphadenopathy [No CVA Tenderness] : no CVA  tenderness [No Spinal Tenderness] : no spinal tenderness [No Joint Swelling] : no joint swelling [No Rash] : no rash [Grossly Normal Strength/Tone] : grossly normal strength/tone [Normal Gait] : normal gait [Coordination Grossly Intact] : coordination grossly intact [Normal Affect] : the affect was normal [Deep Tendon Reflexes (DTR)] : deep tendon reflexes were 2+ and symmetric [No Focal Deficits] : no focal deficits [Normal Insight/Judgement] : insight and judgment were intact [de-identified] : bilateral foot deformities with curving of 1 and 2 and 3 toes

## 2019-05-07 NOTE — HISTORY OF PRESENT ILLNESS
[FreeTextEntry1] : dm, hyperlipidemia , psoriasis  [de-identified] : Pt is feeling well.  No increased urination or thirst. He is on prednisone  due to pain in his right foot and had seen podiatrist and injected his food Dr Pacheco.  He then developed pain in his left foot and given cortisone medrol sha.  He states his uric acid level was normal.

## 2019-05-07 NOTE — COUNSELING
[Weight management counseling provided] : Weight management [Healthy eating counseling provided] : healthy eating [Fall prevention counseling provided] : fall prevention  [Sleep ___ hours/day] : Sleep [unfilled] hours/day [Plan in advance] : Plan in advance [Engage in a relaxing activity] : Engage in a relaxing activity [Adequate lighting] : Adequate lighting [No throw rugs] : No throw rugs [Use proper foot wear] : Use proper foot wear [Use recommended devices] : Use recommended devices [None] : None [Good understanding] : Patient has a good understanding of lifestyle changes and the steps needed to achieve self management goals

## 2019-05-07 NOTE — ASSESSMENT
[FreeTextEntry1] : Pituitary adenoma mri revealed evolution of the adenoma and prior hemorrhage.  mri of abd - no fibrosis of liver. kpa score was normal.   DM he had hgbA1c 6.6 % He doesn’t want to go on medication and has been following diet . He eats chocolate and ice cream and we discussed low carb variation and seeing diabetic educator.  fibrosis of liver has resolved with pt stopping methotrexate and also decreased his alcohol consumption.  foot pain due to psoriatic arthritis changes and is seeing podiatrist.   HPn elevated but when I  checked it was normal watch salt intake DASh diet.

## 2019-05-07 NOTE — DATA REVIEWED
[FreeTextEntry1] : reviewed.  we discussed findings on mri of brain, and abd and carotids were negative .

## 2019-06-10 ENCOUNTER — APPOINTMENT (OUTPATIENT)
Dept: HEPATOLOGY | Facility: CLINIC | Age: 69
End: 2019-06-10
Payer: MEDICARE

## 2019-06-10 VITALS
TEMPERATURE: 98.1 F | SYSTOLIC BLOOD PRESSURE: 122 MMHG | OXYGEN SATURATION: 96 % | HEART RATE: 69 BPM | DIASTOLIC BLOOD PRESSURE: 67 MMHG | WEIGHT: 186 LBS | HEIGHT: 70 IN | RESPIRATION RATE: 16 BRPM | BODY MASS INDEX: 26.63 KG/M2

## 2019-06-10 PROCEDURE — 99213 OFFICE O/P EST LOW 20 MIN: CPT

## 2019-06-11 ENCOUNTER — APPOINTMENT (OUTPATIENT)
Dept: DERMATOLOGY | Facility: CLINIC | Age: 69
End: 2019-06-11
Payer: MEDICARE

## 2019-06-11 VITALS
WEIGHT: 186 LBS | HEART RATE: 62 BPM | DIASTOLIC BLOOD PRESSURE: 78 MMHG | SYSTOLIC BLOOD PRESSURE: 126 MMHG | BODY MASS INDEX: 26.63 KG/M2 | HEIGHT: 70 IN | TEMPERATURE: 98 F

## 2019-06-11 DIAGNOSIS — L57.0 ACTINIC KERATOSIS: ICD-10-CM

## 2019-06-11 PROCEDURE — 99213 OFFICE O/P EST LOW 20 MIN: CPT

## 2019-07-01 ENCOUNTER — APPOINTMENT (OUTPATIENT)
Dept: INTERNAL MEDICINE | Facility: CLINIC | Age: 69
End: 2019-07-01

## 2019-08-05 ENCOUNTER — APPOINTMENT (OUTPATIENT)
Dept: NEUROLOGY | Facility: CLINIC | Age: 69
End: 2019-08-05
Payer: MEDICARE

## 2019-08-05 VITALS
OXYGEN SATURATION: 98 % | SYSTOLIC BLOOD PRESSURE: 132 MMHG | HEIGHT: 70 IN | BODY MASS INDEX: 27.49 KG/M2 | DIASTOLIC BLOOD PRESSURE: 78 MMHG | TEMPERATURE: 98 F | HEART RATE: 61 BPM | WEIGHT: 192 LBS

## 2019-08-05 PROCEDURE — 99205 OFFICE O/P NEW HI 60 MIN: CPT

## 2019-08-05 NOTE — DISCUSSION/SUMMARY
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 \par There is significant volume loss of the cortex and concurrent  defects on the Saulo Cognitive Assessment. 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 Follow up after endocrinology and ophthal tests by telephone

## 2019-08-05 NOTE — PHYSICAL EXAM
[General Appearance - Alert] : alert [General Appearance - In No Acute Distress] : in no acute distress [General Appearance - Well Nourished] : well nourished [General Appearance - Well Developed] : well developed [General Appearance - Well-Appearing] : healthy appearing [Affect] : the affect was normal [Mood] : the mood was normal [Place] : oriented to place [Registration Intact] : recent registration memory intact [Visual Intact] : visual attention was ~T not ~L decreased [Repeating Phrases] : no difficulty repeating a phrase [Writing A Sentence] : no difficulty writing a sentence [Fluency] : fluency intact [Comprehension] : comprehension intact [Reading] : reading intact [Past History] : adequate knowledge of personal past history [___ / 30] : the patient achieved a total score of [unfilled] /30 [___ / 5] : Visuospatial / Executive: [unfilled] / 5 [___ / 3] : Attention (Serial 7 subtraction): [unfilled] / 3 [___ / 1] : Fluency: [unfilled] / 1 [___ / 5] : Delayed Recall: [unfilled] / 5 [___ / 2] : Abstraction: [unfilled] / 2 [___ / 6] : Orientation: [unfilled] / 6 [Cranial Nerves Optic (II)] : visual acuity intact bilaterally,  visual fields full to confrontation, pupils equal round and reactive to light [Cranial Nerves Trigeminal (V)] : facial sensation intact symmetrically [Cranial Nerves Oculomotor (III)] : extraocular motion intact [Cranial Nerves Vestibulocochlear (VIII)] : hearing was intact bilaterally [Motor Handedness Right-Handed] : the patient is right hand dominant [Motor Strength Lower Extremities Bilaterally] : there was weakness in both lower extremities [Tactile Decrease Distal Extremities (Glove And Stocking)] : diminished stocking/glove distribution [Pain / Temp Decrease Distal Extremities (Glove & Stocking)] : diminished stocking/glove distribution [2+] : Brachioradialis right 2+ [1+] : Patella right 1+ [0] : Ankle jerk left 0 [Sclera] : the sclera and conjunctiva were normal [PERRL With Normal Accommodation] : pupils were equal in size, round, reactive to light, with normal accommodation [Extraocular Movements] : extraocular movements were intact [Optic Disc Abnormality] : the optic disc were normal in size and color [No APD] : no afferent pupillary defect [No MARYBETH] : no internuclear ophthalmoplegia [Full Visual Field] : full visual field [Outer Ear] : the ears and nose were normal in appearance [Examination Of The Oral Cavity] : the lips and gums were normal [Oropharynx] : the oropharynx was normal [Neck Appearance] : the appearance of the neck was normal [Jugular Venous Distention Increased] : there was no jugular-venous distention [Neck Cervical Mass (___cm)] : no neck mass was observed [Thyroid Diffuse Enlargement] : the thyroid was not enlarged [Thyroid Nodule] : there were no palpable thyroid nodules [Heart Rate And Rhythm] : heart rate was normal and rhythm regular [Auscultation Breath Sounds / Voice Sounds] : lungs were clear to auscultation bilaterally [Heart Sounds] : normal S1 and S2 [Heart Sounds Gallop] : no gallops [Murmurs] : no murmurs [Heart Sounds Pericardial Friction Rub] : no pericardial rub [Full Pulse] : the pedal pulses are present [Edema] : there was no peripheral edema [Bowel Sounds] : normal bowel sounds [Abdomen Soft] : soft [Abdomen Tenderness] : non-tender [Abdomen Mass (___ Cm)] : no abdominal mass palpated [Abnormal Walk] : normal gait [Nail Clubbing] : no clubbing  or cyanosis of the fingernails [Musculoskeletal - Swelling] : no joint swelling seen [Motor Tone] : muscle strength and tone were normal [Skin Color & Pigmentation] : normal skin color and pigmentation [Skin Turgor] : normal skin turgor [FreeTextEntry1] : Normal visual fields by confrontation; diminished sensations for all modalities in the toes and ankles with deformities of the metatarso-phalyngeal and interphalangeal joints. Weakness toe extension flexion - unable to walk on toes and heels; poor balance and Romberg negative [Person] : disoriented to person [Time] : disoriented to time [Short Term Intact] : short term memory impaired [Concentration Intact] : a decrease in concentrating ability was observed [Span Intact] : the attention span was decreased [Naming Objects] : difficulty naming common objects [Vocabulary] : inadequate range of vocabulary [Current Events] : inadequate knowledge of current events [Motor Strength Upper Extremities Bilaterally] : strength was normal in both upper extremities [Tactile Decrease Monofilament Wire Test Right Foot] : normal right foot monofilament wire testing [Tactile Decrease Monofilament Wire Test Left Foot] : normal left foot monofilament wire testing [] : normal right dorsum of the foot [Pain / Temp Decrease Toes Right Foot] : abnormal right fifth toe [Pain / Temp Decrease Toes Left Foot] : abnormal left fifth toe [Primitive Reflexes] : primitive reflexes were absent

## 2019-08-05 NOTE — REVIEW OF SYSTEMS
[Leg Weakness] : leg weakness [Poor Coordination] : poor coordination [Numbness] : numbness [Fainting] : fainting [Difficulty Walking] : difficulty walking [Leg Claudication] : intermittent leg claudication [Arthralgias] : arthralgias [Joint Pain] : joint pain [Joint Stiffness] : joint stiffness [Erectile Dysfunction] : erectile dysfunction [Memory Lapses or Loss] : memory loss [Fever] : no fever [Chills] : no chills [Feeling Poorly] : not feeling poorly [Feeling Tired] : not feeling tired [Recent Weight Gain (___ Lbs)] : no recent weight gain [Recent Weight Loss (___ Lbs)] : no recent weight loss [Suicidal] : not suicidal [Sleep Disturbances] : no sleep disturbances [Anxiety] : no anxiety [Depression] : no depression [Change In Personality] : no personality change [Emotional Problems] : no emotional problems [Confused or Disoriented] : no confusion [Decr. Concentrating Ability] : no decrease in concentrating ability [Difficulty with Language] : no ~M difficulty with language [Repeating Questions] : no repeated questioning about recent events [Changed Thought Patterns] : no change in thought patterns [Facial Weakness] : no facial weakness [Arm Weakness] : no arm weakness [Hand Weakness] : no hand weakness [Difficulty Writing] : no difficulty writing [Difficulties in Speech] : no speech difficulties [Tingling] : no tingling [Seizures] : no convulsions [Abnormal Sensation] : no abnormal sensation [Dizziness] : no dizziness [Lightheadedness] : no lightheadedness [Vertigo] : no vertigo [Migraine Headache] : no migraine headache [Inability to Walk] : able to walk [Ataxia] : no ataxia [Frequent Falls] : not falling [Limping] : not limping [Eye Pain] : no eye pain [Red Eyes] : eyes not red [Eyesight Problems] : no eyesight problems [Discharge From Eyes] : no purulent discharge from the eyes [Eyes Itch] : no itching of the eyes [Dry Eyes] : no dryness of the eyes [Earache] : no earache [Loss Of Hearing] : no hearing loss [Nosebleeds] : no nosebleeds [Nasal Discharge] : no nasal discharge [Sore Throat] : no sore throat [Hoarseness] : no hoarseness [Heart Rate Is Slow] : the heart rate was not slow [Chest Pain] : no chest pain [Heart Rate Is Fast] : the heart rate was not fast [Palpitations] : no palpitations [Lower Ext Edema] : no extremity edema [Shortness Of Breath] : no shortness of breath [Wheezing] : no wheezing [Cough] : no cough [SOB on Exertion] : no shortness of breath during exertion [Orthopnea] : no orthopnea [PND] : no PND [Abdominal Pain] : no abdominal pain [Vomiting] : no vomiting [Constipation] : no constipation [Heartburn] : no heartburn [Diarrhea] : no diarrhea [Melena] : no melena [Dysuria] : no dysuria [Incontinence] : no incontinence [Hesitancy] : no urinary hesitancy [Nocturia] : no nocturia [Testicular Pain] : no testicular pain [Genital Lesion] : no genital lesions [Limb Pain] : no limb pain [Limb Swelling] : no limb swelling [Skin Lesions] : no skin lesions [Skin Wound] : no skin wound [Itching] : no itching [Change In A Mole] : no change in a mole [Dry Skin] : no dry skin [An Unusual Growth] : no unusual growth on the skin [Proptosis] : no proptosis [Hot Flashes] : no hot flashes [Muscle Weakness] : no muscle weakness [Deepening Of The Voice] : no deepening of the voice [Feelings Of Weakness] : no feelings of weakness [Easy Bruising] : no tendency for easy bruising [Easy Bleeding] : no tendency for easy bleeding [Swollen Glands] : no swollen glands [Swollen Glands In The Neck] : no swollen glands in the neck

## 2019-08-19 ENCOUNTER — NON-APPOINTMENT (OUTPATIENT)
Age: 69
End: 2019-08-19

## 2019-08-19 ENCOUNTER — APPOINTMENT (OUTPATIENT)
Dept: OPHTHALMOLOGY | Facility: CLINIC | Age: 69
End: 2019-08-19
Payer: MEDICARE

## 2019-08-19 PROCEDURE — 92225: CPT | Mod: RT

## 2019-08-19 PROCEDURE — 92004 COMPRE OPH EXAM NEW PT 1/>: CPT

## 2019-08-26 ENCOUNTER — APPOINTMENT (OUTPATIENT)
Dept: OPHTHALMOLOGY | Facility: CLINIC | Age: 69
End: 2019-08-26
Payer: MEDICARE

## 2019-08-26 ENCOUNTER — NON-APPOINTMENT (OUTPATIENT)
Age: 69
End: 2019-08-26

## 2019-08-26 PROCEDURE — 92083 EXTENDED VISUAL FIELD XM: CPT

## 2019-08-26 PROCEDURE — 92012 INTRM OPH EXAM EST PATIENT: CPT

## 2019-08-26 PROCEDURE — 92134 CPTRZ OPH DX IMG PST SGM RTA: CPT

## 2019-09-12 ENCOUNTER — APPOINTMENT (OUTPATIENT)
Dept: DERMATOLOGY | Facility: CLINIC | Age: 69
End: 2019-09-12
Payer: MEDICARE

## 2019-09-12 VITALS
SYSTOLIC BLOOD PRESSURE: 145 MMHG | HEART RATE: 61 BPM | BODY MASS INDEX: 27.2 KG/M2 | DIASTOLIC BLOOD PRESSURE: 89 MMHG | OXYGEN SATURATION: 96 % | HEIGHT: 70 IN | WEIGHT: 190 LBS

## 2019-09-12 DIAGNOSIS — Z85.828 PERSONAL HISTORY OF OTHER MALIGNANT NEOPLASM OF SKIN: ICD-10-CM

## 2019-09-12 DIAGNOSIS — Z12.83 ENCOUNTER FOR SCREENING FOR MALIGNANT NEOPLASM OF SKIN: ICD-10-CM

## 2019-09-12 PROCEDURE — 99213 OFFICE O/P EST LOW 20 MIN: CPT | Mod: GC

## 2019-10-09 ENCOUNTER — OTHER (OUTPATIENT)
Age: 69
End: 2019-10-09

## 2019-12-09 ENCOUNTER — APPOINTMENT (OUTPATIENT)
Dept: HEPATOLOGY | Facility: CLINIC | Age: 69
End: 2019-12-09

## 2020-02-24 ENCOUNTER — APPOINTMENT (OUTPATIENT)
Dept: OPHTHALMOLOGY | Facility: CLINIC | Age: 70
End: 2020-02-24

## 2020-02-27 ENCOUNTER — APPOINTMENT (OUTPATIENT)
Dept: INTERNAL MEDICINE | Facility: CLINIC | Age: 70
End: 2020-02-27
Payer: MEDICARE

## 2020-02-27 VITALS
SYSTOLIC BLOOD PRESSURE: 144 MMHG | OXYGEN SATURATION: 96 % | HEIGHT: 71 IN | WEIGHT: 182 LBS | HEART RATE: 60 BPM | BODY MASS INDEX: 25.48 KG/M2 | TEMPERATURE: 97.5 F | DIASTOLIC BLOOD PRESSURE: 81 MMHG

## 2020-02-27 DIAGNOSIS — Z01.818 ENCOUNTER FOR OTHER PREPROCEDURAL EXAMINATION: ICD-10-CM

## 2020-02-27 DIAGNOSIS — H61.23 IMPACTED CERUMEN, BILATERAL: ICD-10-CM

## 2020-02-27 DIAGNOSIS — Z87.19 PERSONAL HISTORY OF OTHER DISEASES OF THE DIGESTIVE SYSTEM: ICD-10-CM

## 2020-02-27 DIAGNOSIS — K62.3 RECTAL PROLAPSE: ICD-10-CM

## 2020-02-27 DIAGNOSIS — R79.9 ABNORMAL FINDING OF BLOOD CHEMISTRY, UNSPECIFIED: ICD-10-CM

## 2020-02-27 DIAGNOSIS — Z87.39 PERSONAL HISTORY OF OTHER DISEASES OF THE MUSCULOSKELETAL SYSTEM AND CONNECTIVE TISSUE: ICD-10-CM

## 2020-02-27 DIAGNOSIS — Z86.69 PERSONAL HISTORY OF OTHER DISEASES OF THE NERVOUS SYSTEM AND SENSE ORGANS: ICD-10-CM

## 2020-02-27 DIAGNOSIS — Z85.828 PERSONAL HISTORY OF OTHER MALIGNANT NEOPLASM OF SKIN: ICD-10-CM

## 2020-02-27 LAB
ALBUMIN SERPL ELPH-MCNC: 4.7 G/DL
ALP BLD-CCNC: 119 U/L
ALT SERPL-CCNC: 15 U/L
ANION GAP SERPL CALC-SCNC: 14 MMOL/L
APPEARANCE: CLEAR
AST SERPL-CCNC: 17 U/L
BACTERIA: NEGATIVE
BASOPHILS # BLD AUTO: 0.04 K/UL
BASOPHILS NFR BLD AUTO: 0.5 %
BILIRUB SERPL-MCNC: 0.5 MG/DL
BILIRUBIN URINE: NEGATIVE
BLOOD URINE: NEGATIVE
BUN SERPL-MCNC: 14 MG/DL
CALCIUM SERPL-MCNC: 9.4 MG/DL
CHLORIDE SERPL-SCNC: 103 MMOL/L
CHOLEST SERPL-MCNC: 207 MG/DL
CHOLEST/HDLC SERPL: 4 RATIO
CO2 SERPL-SCNC: 26 MMOL/L
COLOR: NORMAL
CREAT SERPL-MCNC: 1.2 MG/DL
EOSINOPHIL # BLD AUTO: 0.09 K/UL
EOSINOPHIL NFR BLD AUTO: 1.2 %
FRUCTOSAMINE SERPL-MCNC: 253 UMOL/L
GLUCOSE QUALITATIVE U: NEGATIVE
GLUCOSE SERPL-MCNC: 117 MG/DL
HCT VFR BLD CALC: 51.9 %
HDLC SERPL-MCNC: 51 MG/DL
HGB BLD-MCNC: 16.4 G/DL
HYALINE CASTS: 0 /LPF
IMM GRANULOCYTES NFR BLD AUTO: 0.3 %
KETONES URINE: NEGATIVE
LDLC SERPL CALC-MCNC: 122 MG/DL
LEUKOCYTE ESTERASE URINE: NEGATIVE
LYMPHOCYTES # BLD AUTO: 1.47 K/UL
LYMPHOCYTES NFR BLD AUTO: 19.8 %
MAN DIFF?: NORMAL
MCHC RBC-ENTMCNC: 29.8 PG
MCHC RBC-ENTMCNC: 31.6 GM/DL
MCV RBC AUTO: 94.4 FL
MICROSCOPIC-UA: NORMAL
MONOCYTES # BLD AUTO: 0.5 K/UL
MONOCYTES NFR BLD AUTO: 6.7 %
NEUTROPHILS # BLD AUTO: 5.31 K/UL
NEUTROPHILS NFR BLD AUTO: 71.5 %
NITRITE URINE: NEGATIVE
PH URINE: 6
PLATELET # BLD AUTO: 207 K/UL
POTASSIUM SERPL-SCNC: 4.5 MMOL/L
PROT SERPL-MCNC: 6.9 G/DL
PROTEIN URINE: NEGATIVE
PSA SERPL-MCNC: 2.69 NG/ML
RBC # BLD: 5.5 M/UL
RBC # FLD: 14.9 %
RED BLOOD CELLS URINE: 2 /HPF
SODIUM SERPL-SCNC: 143 MMOL/L
SPECIFIC GRAVITY URINE: 1.01
SQUAMOUS EPITHELIAL CELLS: 0 /HPF
TRIGL SERPL-MCNC: 170 MG/DL
UROBILINOGEN URINE: NORMAL
WBC # FLD AUTO: 7.43 K/UL
WHITE BLOOD CELLS URINE: 1 /HPF

## 2020-02-27 PROCEDURE — 93000 ELECTROCARDIOGRAM COMPLETE: CPT

## 2020-02-27 PROCEDURE — 82270 OCCULT BLOOD FECES: CPT

## 2020-02-27 PROCEDURE — G0439: CPT

## 2020-02-27 RX ORDER — INFLUENZA A VIRUS A/MICHIGAN/45/2015 X-275 (H1N1) ANTIGEN (FORMALDEHYDE INACTIVATED), INFLUENZA A VIRUS A/SINGAPORE/INFIMH-16-0019/2016 IVR-186 (H3N2) ANTIGEN (FORMALDEHYDE INACTIVATED), AND INFLUENZA B VIRUS B/MARYLAND/15/2016 BX-69A (A B/COLORADO/6/2017-LIKE VIRUS) ANTIGEN (FORMALDEHYDE INACTIVATED) 60; 60; 60 UG/.5ML; UG/.5ML; UG/.5ML
0.5 INJECTION, SUSPENSION INTRAMUSCULAR
Qty: 1 | Refills: 0 | Status: COMPLETED | COMMUNITY
Start: 2018-12-20 | End: 2020-02-27

## 2020-02-27 NOTE — HEALTH RISK ASSESSMENT
[Very Good] : ~his/her~  mood as very good [Yes] : Yes [2 - 3 times a week (3 pts)] : 2 - 3  times a week (3 points) [Never (0 pts)] : Never (0 points) [3 or 4 (1 pt)] : 3 or 4  (1 point) [No falls in past year] : Patient reported no falls in the past year [0] : 2) Feeling down, depressed, or hopeless: Not at all (0) [Patient reported colonoscopy was normal] : Patient reported colonoscopy was normal [HIV test declined] : HIV test declined [Hepatitis C test offered] : Hepatitis C test offered [None] : None [# of Members in Household ___] :  household currently consist of [unfilled] member(s) [With Family] : lives with family [Retired] : retired [High School] : high school [] :  [# Of Children ___] : has [unfilled] children [Sexually Active] : sexually active [Feels Safe at Home] : Feels safe at home [Fully functional (bathing, dressing, toileting, transferring, walking, feeding)] : Fully functional (bathing, dressing, toileting, transferring, walking, feeding) [Fully functional (using the telephone, shopping, preparing meals, housekeeping, doing laundry, using] : Fully functional and needs no help or supervision to perform IADLs (using the telephone, shopping, preparing meals, housekeeping, doing laundry, using transportation, managing medications and managing finances) [Reports changes in hearing] : Reports changes in hearing [Smoke Detector] : smoke detector [Carbon Monoxide Detector] : carbon monoxide detector [Safety elements used in home] : safety elements used in home [Seat Belt] :  uses seat belt [Sunscreen] : uses sunscreen [Name: ___] : Health Care Proxy's Name: [unfilled]  [Relationship: ___] : Relationship: [unfilled] [FreeTextEntry1] : none [] : No [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  [Audit-CScore] : 4pts  [de-identified] : none  [de-identified] : healthy  [Language] : denies difficulty with language [Change in mental status noted] : No change in mental status noted [Learning/Retaining New Information] : denies difficulty learning/retaining new information [Behavior] : denies difficulty with behavior [Handling Complex Tasks] : denies difficulty handling complex tasks [Reasoning] : denies difficulty with reasoning [Spatial Ability and Orientation] : denies difficulty with spatial ability and orientation [Reports changes in dental health] : Reports no changes in dental health [Reports changes in vision] : Reports no changes in vision [Guns at Home] : no guns at home [Travel to Developing Areas] : does not  travel to developing areas [TB Exposure] : is not being exposed to tuberculosis [Caregiver Concerns] : does not have caregiver concerns [BoneDensityDate] : 2014 [HepatitisCDate] : 2016 [ColonoscopyDate] : 2013  [de-identified] : last eye exam last week  [de-identified] : buzzing [de-identified] : last dental 6 months ago  [AdvancecareDate] : 02/20

## 2020-02-27 NOTE — HISTORY OF PRESENT ILLNESS
[FreeTextEntry1] : cpe  [de-identified] : Pt is 69 yr old man with  psoriatic arthritis , diabetes , BPH, hpn, basal cell ca, fatty liver, and hyperlipidemia who is here for cpe.

## 2020-02-27 NOTE — PHYSICAL EXAM
[Well Developed] : well developed [Well Nourished] : well nourished [Normal Voice Quality] : was normal [Normal Verbal Skills] : the patient had normal verbal communication skills [Conjunctiva] : the conjunctiva were normal in both eyes [PERRL] : pupils were equal in size, round, and reactive to light [EOM Intact] : extraocular movements were intact [Normal Appearance] : was normal in appearance [Neck Supple] : was supple [Rate ___] : at [unfilled] breaths per minute [Normal Rhythm/Effort] : normal respiratory rhythm and effort [Normal to Percussion] : the lungs were normal to percussion [Clear Bilaterally] : the lungs were clear to auscultation bilaterally [Heart Rate ___] : [unfilled] bpm [Rhythm Regular] : regular [Normal Rate] : normal [Normal S1] : normal S1 [Normal S2] : normal S2 [No Murmur] : no murmurs heard [No Pitting Edema] : no pitting edema present [Rt] : varicose veins of the right leg noted [Lt] : varicose veins of the left leg noted [2+] : left 2+ [No Abnormalities] : the abdominal aorta was not enlarged and no bruit was heard [Examination Of The Breasts] : a normal appearance [No Mass] : no masses were palpated [Soft, Nontender] : the abdomen was soft and nontender [No HSM] : no hepatosplenomegaly noted [None] : no CVA tenderness [Rectal Prolapse] : showed a rectal prolapse [Internal Hemorrhoid] : internal hemorrhoid was present [Stool Sample Taken] : a stool sample was obtained [Urethral Meatus] : meatus normal [Urinary Bladder Findings] : the bladder was normal on palpation [Testes Mass (___cm)] : there were no testicular masses [Scrotum] : the scrotum was normal [Prostate Enlarged] : was enlarged [No Lymphangitis] : no lymphangitis observed [No Visual Abnormalities] : no visible abnormalities [Normal Lordosis] : normal lordosis [No Scoliosis] : no scoliosis [No Tenderness to Palpation] : no spine tenderness on palpation [No Masses] : no masses [No Pain with ROM] : no pain with motion in any direction [Full ROM] : full ROM [Intact] : all reflexes within normal limits bilaterally [Normal Station and Gait] : the gait and station were normal [Normal Motor Tone] : the muscle tone was normal [Involuntary Movements] : no involuntary movements were seen [Examination Of The Hair] : texture and distribution of hair was normal [Normal Scalp] : inspection of the scalp showed no abnormalities [Complexion Fair] : fair complexion [Normal Mental Status] : the patient's orientation, memory, attention, language and fund of knowledge were normal [Normal] : the deep tendon reflexes were normal [Appropriate] : appropriate [Comprehensive Foot Exam Normal] : Right and left foot were examined and both feet are normal. No ulcers in either foot. Toes are normal and with full ROM.  Normal tactile sensation with monofilament testing throughout both feet [Enlarged Diffusely] : was not enlarged [S3] : no S3 [S4] : no S4 [Right Carotid Bruit] : no bruit heard over the right carotid [Left Carotid Bruit] : no bruit heard over the left carotid [Right Femoral Bruit] : no bruit heard over the right femoral artery [Left Femoral Bruit] : no bruit heard over the left femoral artery [Bruit] : no bruit heard [External Hemorrhoid] : no external hemorrhoids were present [Tender, Swollen] : that was nontender and non-swollen [Gross Blood] : no gross blood [Occult Blood Positive] : was negative for occult blood [Fecal Impaction] : no fecal impaction was present [Postauricular Lymph Nodes Enlarged Bilaterally] : nodes not enlarged [Preauricular Lymph Nodes Enlarged Bilaterally] : nodes not enlarged [Submandibular Lymph Nodes Enlarged Bilaterally] : nodes not enlarged [Suboccipital Lymph Nodes Enlarged Bilaterally] : nodes not enlarged [Submental Lymph Nodes Enlarged] : nodes not enlarged [Cervical Lymph Nodes Enlarged Posterior Bilaterally] : nodes not enlarged [Supraclavicular Lymph Nodes Enlarged Bilaterally] : nodes not enlarged [Cervical Lymph Nodes Enlarged Anterior Bilaterally] : nodes not enlarged [Axillary Lymph Nodes Enlarged Bilaterally] : nodes not enlarged [Epitrochlear Lymph Nodes Enlarged Bilaterally] : nodes not enlarged [Femoral Lymph Nodes Enlarged Bilaterally] : nodes not enlarged [Inguinal Lymph Nodes Enlarged Bilaterally] : nodes not enlarged [Skin Lesions 1] : no skin lesions were observed [Skin Turgor Decreased] : normal skin turgor [Abnormal Color] : normal color and pigmentation [Impaired Insight] : intact insight [Impaired judgment] : intact judgment [de-identified] : tongue normal  teeth fair repair  [de-identified] : no lesions seen of basal cell  psoriasis  is well conrolled

## 2020-02-27 NOTE — ASSESSMENT
[FreeTextEntry1] : health maintenance -  Bmi 25  Weight loss, exercise, and diet control were discussed and are highly encouraged. Treatment options were given such as, aqua therapy, and contacting a nutritionist. Recommended to use the elliptical, stationary bike, less use of treadmill. Mindful eating was explained to the patient Obesity is associated with worsening asthma, shortness of breath, and potential for cardiac disease, diabetes, and other underlying medical conditions.\par \par  h eis up to date with eye and dental exam. he needs bone density and colon cancer screening . he would like to have dna testing only   we discussed options  WE discussed procedure and will return prior for blood testing and instruction.  Colon and rectal cancer screening is a way in which doctors check the colon and rectum for signs of cancer or growths (called polyps) that might become cancer. It is done in people who have no symptoms and no reason to think they have cancer. The goal is to find and remove polyps before they become cancer, or to find cancer early, before it grows, spreads, or causes problems.\par \par The colon and rectum are the last part of the digestive tract (figure 1). When doctors talk about colon and rectal cancer screening, they use the term "colorectal." That is just a shorter way of saying "colon and rectal." It's also possible to say just colon cancer screening.\par \par Studies show that having colon cancer screening lowers the chance of dying from colon cancer. There are several different types of screening test that can do this.\par \par What are the different screening tests for colon cancer? — They include:\par \par ?Colonoscopy – Colonoscopy allows the doctor to see directly inside the entire colon. Before you can have a colonoscopy, you must clean out your colon. You do this at home by drinking a special liquid that causes watery diarrhea for several hours. On the day of the test, you get medicine to help you relax. Then a doctor puts a thin tube into your anus and advances it into your colon (figure 2). The tube has a tiny camera attached to it, so the doctor can see inside your colon. The tube also has tiny tools on the end, so the doctor can remove pieces of tissue or polyps if they are there. After polyps or pieces of tissue are removed, they are sent to a lab to be checked for cancer.\par \par \par •Advantages of this test – Colonoscopy finds most small polyps and almost all large polyps and cancers. If found, polyps can be removed right away. This test gives the most accurate results. If any other screening tests are done first and come back positive, a colonoscopy will need to be done for follow-up. If you have a colonoscopy as your first test, you will probably not need a second follow-up test soon after.\par \par \par •Drawbacks to this test – Colonoscopy has some small risks. It can cause bleeding or tear the inside of the colon, but this only happens in 1 out of 1,000 people. Also, cleaning out the bowel beforehand can be unpleasant. Plus, people usually cannot work or drive for the rest of the day after the test, because of the relaxation medicine they must take during the test.\par \par \par Sigmoidoscopy – A sigmoidoscopy is similar to a colonoscopy. The difference is that this test looks only at the last part of the colon, and a colonoscopy looks at the whole colon. Before you have a sigmoidoscopy, you must clean out the lower part of your colon using an enema. This bowel cleaning is not as thorough or unpleasant as the one for colonoscopy. For this test, you do not need to take medicines to help you relax, so you can drive and work afterward if you want.\par \par \par •Advantages of this test – Sigmoidoscopy can find polyps and cancers in the rectum and the last part of the colon. If polyps are found, they can be removed right away.\par \par \par •Drawbacks to this test – In about 2 out of 10,000 people, sigmoidoscopy tears the inside of the colon. The test also can't find polyps or cancers that are in the part of the colon the test does not view (figure 3). If doctors find polyps or cancer during a sigmoidoscopy, they usually follow up with a colonoscopy.\par \par \par CT colonography (also known as virtual colonoscopy or CTC) – CTC looks for cancer and polyps using a special X-ray called a "CT scan." For most CTC tests, the preparation is the same as it is for colonoscopy.\par \par \par •Advantages of this test – CTC can find polyps and cancers in the whole colon without the need for medicines to relax.\par \par \par •Drawbacks to this test – If doctors find polyps or cancer with CTC, they usually follow up with a colonoscopy. CTC sometimes finds areas that look abnormal but that turn out to be healthy. This means that CTC can lead to tests and procedures you did not need. Plus, CTC exposes you to radiation. In most cases, the preparation needed to clean the bowel is the same as the one needed for a colonoscopy. The test is expensive, and some insurance companies might not cover this test for screening.\par \par \par Stool test for blood – "Stool" is another word for bowel movements. Stool tests most commonly check for blood in samples of stool. Cancers and polyps can bleed, and if they bleed around the time you do the stool test, then blood will show up on the test. The test can find even small amounts of blood that you can't see in your stool. Other less serious conditions can also cause small amounts of blood in the stool, and that will show up in this test. You will have to collect small samples from your bowel movements, which you will put in a special container you get from your doctor or nurse. Then you follow the instructions to mail the container out for the testing.\par \par \par •Advantages of this test – This test does not involve cleaning out the colon or having any procedures.\par \par \par •Drawbacks to this test – Stool tests are less likely to find polyps than other screening tests. These tests also often come up abnormal even in people who do not have cancer. If a stool test shows something abnormal, doctors usually follow up with a colonoscopy.\par \par \par Stool DNA test – The stool DNA test checks for genetic markers of cancer, as well as for signs of blood. For this test, you get a special kit in order to collect a whole bowel movement. Then you follow the instructions about how and where to ship it.\par \par \par •Advantages of this test – This test does not involve cleaning out the colon or having any procedures. When cancer is not present, it is less likely to be falsely abnormal than a stool test for blood. That means it leads to fewer unnecessary colonoscopies.\par \par \par •Drawbacks to this test – It might be unpleasant to collect and ship a whole bowel movement. If a DNA test shows something abnormal, doctors usually follow up with a colonoscopy.\par \par \par There is no blood test that most experts think is accurate enough to use for screening.\par \par How do I choose which test to have? — Work with your doctor or nurse to decide which test is best for you. Some doctors might choose to combine screening tests, for example, sigmoidoscopy plus stool testing for blood. Being screened–no matter how–is more important than which test you choose.\par \par Who should be screened for colon cancer? — Doctors recommend that most people begin having colon cancer screening at age 50. People who have an increased risk of getting colon cancer sometimes begin screening at a younger age. That might include people with a strong family history of colon cancer, and people with diseases of the colon called "Crohn's disease" and "ulcerative colitis."\par \par Most people can stop being screened around the age of 75, or at the latest 85.\par \par How often should I be screened? — That depends on your risk of colon cancer and which test you have. People who have a high risk of colon cancer often need to be tested more often and should have a colonoscopy.\par \par Most people are not at high risk, so they can choose one of these schedules:\par \par Colonoscopy every 10 years\par \par CT colonography (CTC) every 5 years\par \par Sigmoidoscopy every 5 to 10 years\par \par Stool testing for blood once a year\par \par Stool DNA testing every 3 years (but doctors are not yet sure of the best time frame)\par 3 REctal prolpase he doesn t have bleeding and is asymptomatic will give him proctocort supp bid.   4 enlarged prostate psa urology fu .  5pit adenoma mri in april 6fatty liver us of abd Fatty Liver: The patient denies any jaundice or pruritus. The patient denies any alcohol use. The patient denies taking large doses of nonsteroidal anti-inflammatory drugs or acetaminophen. The findings are suggestive of fatty liver. The patient and I had a long discussion regarding the risks of fatty liver progressing to cirrhosis. The patient was told of the possible increased risk of developing liver failure, cirrhosis, ascites, GI bleeding secondary to varices, hepatic encephalopathy, bleeding tendencies and liver cancer. The patient was told of the importance of follow-up. The patient was advised to follow up every 6 months for blood work and imaging studies. The patient agreed and will follow up. The patient was advised to lose weight. I recommend a trial of vitamin E supplementation for the fatty liver. If the liver enzymes remain elevated, the patient may require a trial of Pioglitazone for the fatty liver. I recommend avoid alcohol and hepato-toxic agents. The patient was also advised to avoid NSAIDs, Acetaminophen and any other hepatotoxic drugs. The patient was also advised not to share needles, razors, scissors, nail clippers, etc.. The patient is to continue close follow-up in our office for blood work and exams. If the liver enzymes remain elevated, the patient may require a CT guided liver biopsy to assess the liver parenchyma and for possible treatment. We had a long discussion regarding the risks and benefits of the procedure. The patient was told of the risks of bleeding, perforation, infections, emergency surgery and missing lesions. The patient agreed and will follow-up to reassess the symptoms.\par \par

## 2020-02-27 NOTE — COUNSELING
[Fall prevention counseling provided] : Fall prevention counseling provided [Adequate lighting] : Adequate lighting [Use recommended devices] : Use recommended devices [No throw rugs] : No throw rugs [Use proper foot wear] : Use proper foot wear [Sleep ___ hours/day] : Sleep [unfilled] hours/day [Engage in a relaxing activity] : Engage in a relaxing activity [Plan in advance] : Plan in advance [Benefits of weight loss discussed] : Benefits of weight loss discussed [Potential consequences of obesity discussed] : Potential consequences of obesity discussed [Encouraged to maintain food diary] : Encouraged to maintain food diary [Encouraged to increase physical activity] : Encouraged to increase physical activity [Encouraged to use exercise tracking device] : Encouraged to use exercise tracking device [Weigh Self Weekly] : weigh self weekly [Decrease Portions] : decrease portions [____ min/wk Activity] : [unfilled] min/wk activity [Keep Food Diary] : keep food diary [Good understanding] : Patient has a good understanding of disease, goals and obesity follow-up plan [Hazards of at-risk alcohol use discussed] : Hazards of at-risk alcohol use discussed [Support options provided] : Support options provided [Target Wt Loss Goal ___] : Weight Loss Goals: Target weight loss goal [unfilled] lbs [FreeTextEntry2] : ideal weight  156- 179

## 2020-02-27 NOTE — DATA REVIEWED
[FreeTextEntry1] : reviewed  ekg rate 63/min normal sinus rhythm  rate 63/min qrs 142ms qt 438/448 pr 150 ms p[ 114 RBBB no change

## 2020-02-28 LAB
25(OH)D3 SERPL-MCNC: 22.4 NG/ML
CREAT SPEC-SCNC: 118 MG/DL
MICROALBUMIN 24H UR DL<=1MG/L-MCNC: <1.2 MG/DL
MICROALBUMIN/CREAT 24H UR-RTO: NORMAL MG/G
PROLACTIN SERPL-MCNC: 4.9 NG/ML

## 2020-03-01 LAB
ESTIMATED AVERAGE GLUCOSE: 148 MG/DL
HBA1C MFR BLD HPLC: 6.8 %
M TB IFN-G BLD-IMP: NEGATIVE
QUANTIFERON TB PLUS MITOGEN MINUS NIL: 7.18 IU/ML
QUANTIFERON TB PLUS NIL: 0.02 IU/ML
QUANTIFERON TB PLUS TB1 MINUS NIL: 0 IU/ML
QUANTIFERON TB PLUS TB2 MINUS NIL: 0.01 IU/ML

## 2020-03-02 ENCOUNTER — APPOINTMENT (OUTPATIENT)
Dept: UROLOGY | Facility: CLINIC | Age: 70
End: 2020-03-02
Payer: MEDICARE

## 2020-03-02 VITALS
BODY MASS INDEX: 25.48 KG/M2 | SYSTOLIC BLOOD PRESSURE: 151 MMHG | DIASTOLIC BLOOD PRESSURE: 86 MMHG | WEIGHT: 182 LBS | HEART RATE: 70 BPM | HEIGHT: 71 IN

## 2020-03-02 PROCEDURE — 99213 OFFICE O/P EST LOW 20 MIN: CPT

## 2020-03-08 NOTE — PHYSICAL EXAM
[General Appearance - Well Developed] : well developed [General Appearance - Well Nourished] : well nourished [Normal Appearance] : normal appearance [Well Groomed] : well groomed [General Appearance - In No Acute Distress] : no acute distress [Abdomen Soft] : soft [Abdomen Tenderness] : non-tender [Costovertebral Angle Tenderness] : no ~M costovertebral angle tenderness [Urethral Meatus] : meatus normal [Penis Abnormality] : normal circumcised penis [Urinary Bladder Findings] : the bladder was normal on palpation [Scrotum] : the scrotum was normal [Epididymis] : the epididymides were normal [Testes Tenderness] : no tenderness of the testes [Testes Mass (___cm)] : there were no testicular masses [Anus Abnormality] : the anus and perineum were normal [Rectal Exam - Rectum] : digital rectal exam was normal [Prostate Tenderness] : the prostate was not tender [No Prostate Nodules] : no prostate nodules [Prostate Size ___ gm] : prostate size [unfilled] gm [Edema] : no peripheral edema [] : no respiratory distress [Respiration, Rhythm And Depth] : normal respiratory rhythm and effort [Exaggerated Use Of Accessory Muscles For Inspiration] : no accessory muscle use [Oriented To Time, Place, And Person] : oriented to person, place, and time [Affect] : the affect was normal [Mood] : the mood was normal [Not Anxious] : not anxious [Normal Station and Gait] : the gait and station were normal for the patient's age [No Focal Deficits] : no focal deficits [No Palpable Adenopathy] : no palpable adenopathy

## 2020-03-08 NOTE — ASSESSMENT
[FreeTextEntry1] : 68 yo M with history of BPH, ED\par \par - Continue observation for his BPH\par - Discussed the underlying mechanism for erections, pathophysiology of erectile dysfunction (ED) and treatment options. Role of smoking, diabetes, hypertension, hyperlipidemia, coronary artery disease and treatment for benign and malignant prostate conditions was discussed as some of the more common causes of ED. Exercise, weight loss and a healthy lifestyle can be beneficial. We discussed testosterone (T) and its possible link to increased desire for sexual activity, feeling energetic, muscle mass, etc. Low T as a cause for ED is less clear. Mechanism by which PDE-5 inhibitors improve erections was discussed. Medications in this category include Viagra, Levitra, Staxyn, Cialis and Stendra. As needed versus daily dosing was discussed. Also, use of vacuum erection device, urethral suppository (MUSE), intracavernosal injections (Edex, Trimix, Caverject) and surgical placement of inflatable penile prosthesis were discussed in detail. Side effects of each treatment was reviewed and questions were answered.\par - Sildenafil rx transmitted. Side effect profile reviewed

## 2020-03-08 NOTE — HISTORY OF PRESENT ILLNESS
[FreeTextEntry1] : 68 yo M recently evaluated by PCP, found to have an enlarged prostate on physical exam. Here for routine check-up. Pt denies any significant LUTS, gross hematuria, dysuria. AUAss is 0. Erections are not as rigid as before but adequate for penetration. \par \par 3/2/20 Interval history: Pt hasn't been seen since Jan 2018\par Still no significant urinary issues\par Has been having ED issues - lately not adequate for penetration\par Has tried cialis in the past

## 2020-06-10 ENCOUNTER — APPOINTMENT (OUTPATIENT)
Dept: ENDOCRINOLOGY | Facility: CLINIC | Age: 70
End: 2020-06-10

## 2020-06-22 ENCOUNTER — APPOINTMENT (OUTPATIENT)
Dept: CARDIOLOGY | Facility: CLINIC | Age: 70
End: 2020-06-22
Payer: MEDICARE

## 2020-06-22 ENCOUNTER — APPOINTMENT (OUTPATIENT)
Dept: INTERNAL MEDICINE | Facility: CLINIC | Age: 70
End: 2020-06-22
Payer: MEDICARE

## 2020-06-22 VITALS
DIASTOLIC BLOOD PRESSURE: 85 MMHG | TEMPERATURE: 98.3 F | BODY MASS INDEX: 26.04 KG/M2 | HEIGHT: 71 IN | WEIGHT: 186 LBS | SYSTOLIC BLOOD PRESSURE: 145 MMHG | OXYGEN SATURATION: 95 % | HEART RATE: 62 BPM

## 2020-06-22 VITALS
HEIGHT: 71 IN | BODY MASS INDEX: 26.04 KG/M2 | SYSTOLIC BLOOD PRESSURE: 141 MMHG | HEART RATE: 62 BPM | OXYGEN SATURATION: 95 % | TEMPERATURE: 98.3 F | DIASTOLIC BLOOD PRESSURE: 88 MMHG | WEIGHT: 186 LBS

## 2020-06-22 VITALS — DIASTOLIC BLOOD PRESSURE: 88 MMHG | SYSTOLIC BLOOD PRESSURE: 141 MMHG

## 2020-06-22 DIAGNOSIS — N52.9 MALE ERECTILE DYSFUNCTION, UNSPECIFIED: ICD-10-CM

## 2020-06-22 DIAGNOSIS — R55 SYNCOPE AND COLLAPSE: ICD-10-CM

## 2020-06-22 DIAGNOSIS — R80.9 PROTEINURIA, UNSPECIFIED: ICD-10-CM

## 2020-06-22 DIAGNOSIS — Z86.2 PERSONAL HISTORY OF DISEASES OF THE BLOOD AND BLOOD-FORMING ORGANS AND CERTAIN DISORDERS INVOLVING THE IMMUNE MECHANISM: ICD-10-CM

## 2020-06-22 LAB
ESTIMATED AVERAGE GLUCOSE: 148 MG/DL
HBA1C MFR BLD HPLC: 6.8 %

## 2020-06-22 PROCEDURE — 93000 ELECTROCARDIOGRAM COMPLETE: CPT

## 2020-06-22 PROCEDURE — 99205 OFFICE O/P NEW HI 60 MIN: CPT

## 2020-06-22 PROCEDURE — 99214 OFFICE O/P EST MOD 30 MIN: CPT

## 2020-06-22 RX ORDER — TRIAMCINOLONE ACETONIDE 1 MG/G
0.1 OINTMENT TOPICAL
Qty: 1 | Refills: 1 | Status: COMPLETED | COMMUNITY
Start: 2017-10-13 | End: 2020-06-22

## 2020-06-22 RX ORDER — HYDROCORTISONE ACETATE 30 MG/1
30 SUPPOSITORY RECTAL TWICE DAILY
Qty: 20 | Refills: 2 | Status: COMPLETED | COMMUNITY
Start: 2020-02-27 | End: 2020-06-22

## 2020-06-22 RX ORDER — CHOLECALCIFEROL (VITAMIN D3) 1250 MCG
1.25 MG CAPSULE ORAL
Qty: 4 | Refills: 2 | Status: COMPLETED | COMMUNITY
Start: 2020-02-28 | End: 2020-06-22

## 2020-06-22 RX ORDER — INFLUENZA A VIRUS A/MICHIGAN/45/2015 X-275 (H1N1) ANTIGEN (FORMALDEHYDE INACTIVATED), INFLUENZA A VIRUS A/SINGAPORE/INFIMH-16-0019/2016 IVR-186 (H3N2) ANTIGEN (FORMALDEHYDE INACTIVATED), AND INFLUENZA B VIRUS B/MARYLAND/15/2016 BX-69A (A B/COLORADO/6/2017-LIKE VIRUS) ANTIGEN (FORMALDEHYDE INACTIVATED) 60; 60; 60 UG/.5ML; UG/.5ML; UG/.5ML
0.5 INJECTION, SUSPENSION INTRAMUSCULAR
Qty: 1 | Refills: 0 | Status: COMPLETED | COMMUNITY
Start: 2020-02-27 | End: 2020-06-22

## 2020-06-22 NOTE — HEALTH RISK ASSESSMENT
[No] : No [No falls in past year] : Patient reported no falls in the past year [] : No [0] : 2) Feeling down, depressed, or hopeless: Not at all (0) [de-identified] : walks  [de-identified] : low salt diet

## 2020-06-22 NOTE — PHYSICAL EXAM
[PERRL] : pupils equal round and reactive to light [Normal Sclera/Conjunctiva] : normal sclera/conjunctiva [Normal Oropharynx] : the oropharynx was normal [No JVD] : no jugular venous distention [Supple] : supple [Normal Rate] : normal rate  [Regular Rhythm] : with a regular rhythm [No Edema] : there was no peripheral edema [Normal S1, S2] : normal S1 and S2 [No Extremity Clubbing/Cyanosis] : no extremity clubbing/cyanosis [Normal Anterior Cervical Nodes] : no anterior cervical lymphadenopathy [Normal Posterior Cervical Nodes] : no posterior cervical lymphadenopathy [Normal] : no CVA or spinal tenderness [Coordination Grossly Intact] : coordination grossly intact [No Focal Deficits] : no focal deficits [Normal Gait] : normal gait [Normal Affect] : the affect was normal [Normal Insight/Judgement] : insight and judgment were intact [de-identified] : improved lesion of psooriasis  remission noted  on arms back chest legs.   [de-identified] : deformities

## 2020-06-22 NOTE — ASSESSMENT
[FreeTextEntry1] : 1 Dm ---The following has been discussed:---\par -Targets for weight and HgA1c have been discussed with patient \par -FS goals have been reviewed with the patient in detail:\par AM <130 post meal<160-180\par -Diet and weight goals have been discussed with the patient in detail.\par -The importance of exercise in the treatment of diabetes has been discussed \par with the patient in detail.\par -Extensive dietary advice provided to patient and the need to avoid concentrated \par sweets/simple carbohydrates and to ensure to consume protein with each meal. \par -Patient instructed to limit carbohydrates to 60 gms per meal and 15 gms per \par snacks. \par -Patient to keep a blood sugar log to check fasting and before meals\par -Patient instructed on importance of daily feet inspection and to reports any \par open lesions to physician promptly\par \par 2 hpn DASH stands for Dietary Approaches to Stop Hypertension. The DASH diet can help lower high blood pressure and cholesterol and other fats in your blood. It can help lower your risk for heart attack and stroke and help you lose weight. This diet is low in sodium (salt) and rich in nutrients.\par \par \par \par \par \par How DASH Works\par \par \par \par \par \par \par The DASH diet reduces high blood pressure by lowering the amount of sodium in your diet to 2300 milligrams (mg) a day. Lowering sodium to 1500 mg a day reduces blood pressure even more. It also includes variety of foods rich in nutrients that help lower blood pressure, such as potassium, calcium, and magnesium.\par \par On the DASH diet, you will:\par •Get plenty of vegetables, fruits, and fat-free or low-fat dairy\par •Include whole grains, beans, seeds, nuts, and vegetable oils\par •Eat lean meats, poultry, and fish\par •Cut back on salt, red meat, sweets, and sugary drinks\par •Limit alcoholic beverages \par \par You should also get at least 30 minutes of moderate-intensity exercise most days of the week. Examples include brisk walking or riding a bike. Aim to get 2 hours and 30 minutes of exercise per week.\par \par You can follow the DASH diet if you want to prevent high blood pressure. It can also help you lose extra weight. Most people can benefit from lowering sodium intake to 2300 milligrams (mg) a day.\par \par Your health care provider may suggest cutting back to 1500 mg a day if you:\par •Already have high blood pressure\par •Have diabetes or chronic kidney disease\par •Are \par •Are age 51 or older \par \par If you take medicine to treat high blood pressure, do not stop taking your medicine while on the DASH diet. Be sure to tell your provider you are following the DASH diet.\par \par \par \par \par \par How to get Started\par \par \par \par \par \par \par On the DASH diet, you can eat foods from all food groups. But you eat more of the foods that are naturally low in salt, cholesterol, and saturated fats. You will also include foods that are high in potassium, calcium, magnesium, and fiber.\par \par Here's a list of the food groups and how many servings of each you should have per day. For a diet that has 2000 calories per day, you should eat:\par •Vegetables (4 to 5 servings a day)\par •Fruits (4 to 5 servings a day)\par •Low-fat or fat-free dairy products, such as milk and yogurt (2 to 3 servings a day)\par •Whole grains (7 to 8 servings a day, and 3 should be whole grains)\par •Fish, lean meats, and poultry (2 servings or less a day)\par •Beans, seeds, and nuts (4 to 5 servings a week)\par •Fats and oils (2 to 3 servings a day)\par •Sweets or added sugars, such as jelly, hard candy, maple syrup, sorbet, and sugar (fewer than 5 servings a week) \par \par The number of servings you have each day depends on how many calories you need.\par •If you're trying to lose weight, you may need fewer servings than listed.\par •If you are not very active, aim for the lower number of servings listed.\par •If you are moderately active, have the higher number of servings.\par •If you are very active, you may need more servings than listed.\par \par Your provider can help find the right number of servings a day for you.\par \par \par \par \par \par Know your Serving Sizes\par \par \par \par \par \par \par To know how much to eat, you need to know serving sizes. Below are sample servings for each food group.\par \par Vegetables:\par •1 cup (70 grams) raw leafy vegetables\par •½ cup (90 grams) chopped raw or cooked vegetables \par \par Fruits:\par •1 medium fruit (6 ounces or 168 grams)\par •½ cup (70 grams) fresh, frozen, or canned fruit\par •¼ cup (25 grams) dried fruit \par \par Fat-free or low-fat dairy products:\par •1 cup (240 milliliters) milk or yogurt\par •1½ ounce (oz) or 50 grams (g) cheese \par \par Whole grains (Aim to make all of your grain choices whole grain. Whole grain products contain more fiber and protein than "refined" grain products.):\par •1 slice bread\par •½ cup (80 grams) cooked rice, pasta, or cereal \par \par Lean meats, poultry, and fish:\par •3 oz (85 g) of cooked fish, lean meat, or poultry \par \par Nuts, seeds, and legumes:\par •½ cup (90 grams) cooked legumes (dried beans, peas)\par •1/3 cup (45 grams) nuts\par •1 tablespoon (10 grams) seeds \par \par Fats and oils:\par •1 teaspoon (5 milliliters) vegetable oil\par •2 tablespoons (30 grams) low-fat salad dressing\par •1 teaspoon (5 grams) soft margarine \par \par Sweets and added sugars:\par •1 tablespoon (15 grams) sugar\par •1 tablespoon (15 grams) jelly or jam\par •½ cup (70 grams) sorbet, gelatin dessert \par \par \par \par \par \par Tips for Following DASH\par \par \par \par \par \par \par It's easy to follow the DASH diet. But it might mean making some changes to how you currently eat. To get started:\par •DO NOT try to make changes all at once. It's fine to change your eating habits gradually.\par •To add vegetables to your diet, try having a salad at lunch. Or, add cucumber, lettuce, shredded carrots, or tomatoes to your sandwiches.\par •There should always been something green on your plate. It's fine to use frozen vegetables instead of fresh. Just make sure the package does not contain added salt or fat.\par •Add sliced fruit to your cereal or oatmeal for breakfast.\par •For dessert, choose fresh fruit or low-fat frozen yogurt instead of high-calorie sweets, such as cakes or pies.\par •Choose healthy snacks, such as unsalted rice cakes or popcorn, raw vegetables, or yogurt. Dried fruits, seeds, and nuts also make great snack choices. Just keep these portions small.\par •Think of meat as part of your meal, instead of the main course. Limit your servings of lean meat to 6 ounces (170 grams) a day. You can have two 3-ounce (85 grams) servings during the day.\par •Try cooking without meat at least twice each week. Instead, eat beans, nuts, tofu, or eggs for your protein.\par \par \par \par \par \par Tips to Lower Your Salt\par \par \par \par \par \par \par To lower the amount of salt in your diet:\par •Take the saltshaker off the table.\par •Flavor your food with herbs and spices instead of salt. Lemon, lime, and vinegar also add flavor.\par •Avoid canned foods and frozen entrees. They are often high in salt. When you make things from scratch you have more control over how much salt goes in them.\par •Check all food labels for sodium. You may be surprised at how much you find, and where you find it. Frozen dinners, soups, salad dressings, and prepared foods often have a lot of sodium.\par •Choose foods that contain less than 5% for the daily value of sodium.\par •Look for low-sodium versions of foods when you can find them.\par •Limit foods and condiments that have a lot of salt, such as pickles, olives, cured meats, ketchup, soy sauce, mustard, and barbeque sauce.\par •When dining out, ask that your food be made with no added salt or MSG. \par \par 3.  Hyperlipidemia  diet low fat  exercise \par 4. psoriasis stable continue rheum fu \par 5.  pt adenoma repeat mri for stability and if stable repeat every two years.  \par

## 2020-06-22 NOTE — COUNSELING
[Fall prevention counseling provided] : Fall prevention counseling provided [Adequate lighting] : Adequate lighting [No throw rugs] : No throw rugs [Use proper foot wear] : Use proper foot wear [Use recommended devices] : Use recommended devices [Sleep ___ hours/day] : Sleep [unfilled] hours/day [Plan in advance] : Plan in advance [Engage in a relaxing activity] : Engage in a relaxing activity

## 2020-06-22 NOTE — HISTORY OF PRESENT ILLNESS
[FreeTextEntry1] : psoriasis  hypertension [de-identified] : pt is feeling well.  He has been seeing a neurologist  at [facility] and will be checking his mri.but has not seen pt over  a year.   No increased urination or  thirst and feeling well.  He has not had shingles vaccine.

## 2020-06-23 PROBLEM — N52.9 ERECTILE DYSFUNCTION: Status: ACTIVE | Noted: 2018-06-20

## 2020-06-23 LAB
25(OH)D3 SERPL-MCNC: 42.8 NG/ML
ALBUMIN SERPL ELPH-MCNC: 4.7 G/DL
ALP BLD-CCNC: 103 U/L
ALT SERPL-CCNC: 14 U/L
ANION GAP SERPL CALC-SCNC: 16 MMOL/L
APPEARANCE: CLEAR
AST SERPL-CCNC: 16 U/L
BACTERIA: NEGATIVE
BILIRUB SERPL-MCNC: 0.6 MG/DL
BILIRUBIN URINE: NEGATIVE
BLOOD URINE: NEGATIVE
BUN SERPL-MCNC: 15 MG/DL
CALCIUM SERPL-MCNC: 9.9 MG/DL
CHLORIDE SERPL-SCNC: 102 MMOL/L
CHOLEST SERPL-MCNC: 209 MG/DL
CHOLEST/HDLC SERPL: 3.9 RATIO
CO2 SERPL-SCNC: 24 MMOL/L
COLOR: YELLOW
CREAT SERPL-MCNC: 1.38 MG/DL
FRUCTOSAMINE SERPL-MCNC: 258 UMOL/L
GLUCOSE QUALITATIVE U: NEGATIVE
HDLC SERPL-MCNC: 54 MG/DL
HYALINE CASTS: 1 /LPF
KETONES URINE: NEGATIVE
LDLC SERPL CALC-MCNC: 119 MG/DL
LEUKOCYTE ESTERASE URINE: NEGATIVE
MICROSCOPIC-UA: NORMAL
NITRITE URINE: NEGATIVE
PH URINE: 5.5
POTASSIUM SERPL-SCNC: 4.7 MMOL/L
PROLACTIN SERPL-MCNC: 4.7 NG/ML
PROT SERPL-MCNC: 6.8 G/DL
PROTEIN URINE: ABNORMAL
RED BLOOD CELLS URINE: 5 /HPF
SODIUM SERPL-SCNC: 142 MMOL/L
SPECIFIC GRAVITY URINE: 1.02
SQUAMOUS EPITHELIAL CELLS: 1 /HPF
TRIGL SERPL-MCNC: 183 MG/DL
UROBILINOGEN URINE: NORMAL
WHITE BLOOD CELLS URINE: 1 /HPF

## 2020-06-23 NOTE — REASON FOR VISIT
[FreeTextEntry1] : Dear Dr. Isabel:\par \par I had the pleasure of evaluating your patient, Mr. Joe James, who presents for intial cardiovascular and vascular medicine evaluation for his symptom of presyncope/syncope.  As you know, he is a 69-year-old man with HTN, DM2, hyperlipidemia, pituitary adenoma who had apparently syncopized briefly last year, after having gotten up out of bed to urinate.  He acknowledge that he might have been dehydrated when this episode occurred.  He has not had a prior or another episode since then, and had not had a cardiovascular evaluation/testing.  \par \par He otherwise denies having exertional limitations or other cardiac symptoms.\par \par 12-lead ECG notes sinus bradycardia, RBBB.  Physical examination was unremarkable.\par \par At this time, will recommend an echocardiogram as part of his initial cardiac evaluation.  \par

## 2020-07-08 ENCOUNTER — OUTPATIENT (OUTPATIENT)
Dept: OUTPATIENT SERVICES | Facility: HOSPITAL | Age: 70
LOS: 1 days | End: 2020-07-08
Payer: MEDICARE

## 2020-07-08 ENCOUNTER — APPOINTMENT (OUTPATIENT)
Dept: ULTRASOUND IMAGING | Facility: HOSPITAL | Age: 70
End: 2020-07-08
Payer: MEDICARE

## 2020-07-08 ENCOUNTER — RESULT REVIEW (OUTPATIENT)
Age: 70
End: 2020-07-08

## 2020-07-08 ENCOUNTER — APPOINTMENT (OUTPATIENT)
Dept: MRI IMAGING | Facility: HOSPITAL | Age: 70
End: 2020-07-08
Payer: MEDICARE

## 2020-07-08 DIAGNOSIS — D35.2 BENIGN NEOPLASM OF PITUITARY GLAND: ICD-10-CM

## 2020-07-08 PROCEDURE — 76700 US EXAM ABDOM COMPLETE: CPT | Mod: 26

## 2020-07-08 PROCEDURE — 70553 MRI BRAIN STEM W/O & W/DYE: CPT

## 2020-07-08 PROCEDURE — 76700 US EXAM ABDOM COMPLETE: CPT

## 2020-07-08 PROCEDURE — 70553 MRI BRAIN STEM W/O & W/DYE: CPT | Mod: 26

## 2020-07-13 ENCOUNTER — APPOINTMENT (OUTPATIENT)
Dept: CARDIOLOGY | Facility: CLINIC | Age: 70
End: 2020-07-13

## 2020-07-16 ENCOUNTER — APPOINTMENT (OUTPATIENT)
Dept: INTERNAL MEDICINE | Facility: CLINIC | Age: 70
End: 2020-07-16
Payer: MEDICARE

## 2020-07-16 VITALS
HEART RATE: 64 BPM | BODY MASS INDEX: 26.46 KG/M2 | TEMPERATURE: 98.2 F | WEIGHT: 189 LBS | HEIGHT: 71 IN | SYSTOLIC BLOOD PRESSURE: 132 MMHG | DIASTOLIC BLOOD PRESSURE: 79 MMHG | OXYGEN SATURATION: 96 %

## 2020-07-16 PROCEDURE — 99214 OFFICE O/P EST MOD 30 MIN: CPT

## 2020-07-16 RX ORDER — LANCETS 28 GAUGE
EACH MISCELLANEOUS
Qty: 100 | Refills: 6 | Status: ACTIVE | COMMUNITY
Start: 2020-07-16 | End: 1900-01-01

## 2020-07-16 NOTE — HISTORY OF PRESENT ILLNESS
[FreeTextEntry1] : results diabetes  fatty liver  [de-identified] : pt had mri of pit and no changes , us of abd  fatty liver no tumors .  he states he doesn’t take his glimepiride regularly and has changed his diet.  he is stopping ice cream cakes cookies and fats . he doesn’t want to be on medication for his cholesterol  and understands risks .  he is not having increased urination or thirst.  he is checking his feet daily.

## 2020-07-16 NOTE — ASSESSMENT
[FreeTextEntry1] : 1 ---The following has been discussed:---\par -Targets for weight and HgA1c have been discussed with patient \par -FS goals have been reviewed with the patient in detail:\par AM <130 post meal<160-180\par -Diet and weight goals have been discussed with the patient in detail.\par -The importance of exercise in the treatment of diabetes has been discussed \par with the patient in detail.\par -Extensive dietary advice provided to patient and the need to avoid concentrated \par sweets/simple carbohydrates and to ensure to consume protein with each meal. \par -Patient instructed to limit carbohydrates to 60 gms per meal and 15 gms per \par snacks. \par -Patient to keep a blood sugar log to check fasting and before meals\par -Patient instructed on importance of daily feet inspection and to reports any \par open lesions to physician promptly\par  2. pit adenoma  stable \par 3. fatty liver - stable Fatty Liver: The patient denies any jaundice or pruritus. The patient denies any alcohol use. The patient denies taking large doses of nonsteroidal anti-inflammatory drugs or acetaminophen. The findings are suggestive of fatty liver. The patient and I had a long discussion regarding the risks of fatty liver progressing to cirrhosis. The patient was told of the possible increased risk of developing liver failure, cirrhosis, ascites, GI bleeding secondary to varices, hepatic encephalopathy, bleeding tendencies and liver cancer. The patient was told of the importance of follow-up. The patient was advised to follow up every 6 months for blood work and imaging studies. The patient agreed and will follow up. The patient was advised to lose weight. I recommend a trial of vitamin E supplementation for the fatty liver. If the liver enzymes remain elevated, the patient may require a trial of Pioglitazone for the fatty liver. I recommend avoid alcohol and hepato-toxic agents. The patient was also advised to avoid NSAIDs, Acetaminophen and any other hepatotoxic drugs. The patient was also advised not to share needles, razors, scissors, nail clippers, etc.. The patient is to continue close follow-up in our office for blood work and exams. If the liver enzymes remain elevated, the patient may require a CT guided liver biopsy to assess the liver parenchyma and for possible treatment. We had a long discussion regarding the risks and benefits of the procedure. The patient was told of the risks of bleeding, perforation, infections, emergency surgery and missing lesions. The patient agreed and will follow-up to reassess the symptoms.\par \par 4. bmi 26  Weight loss, exercise, and diet control were discussed and are highly encouraged. Treatment options were given such as, aqua therapy, and contacting a nutritionist. Recommended to use the elliptical, stationary bike, less use of treadmill. Mindful eating was explained to the patient Obesity is associated with worsening asthma, shortness of breath, and potential for cardiac disease, diabetes, and other underlying medical conditions.\par \par \par 5 hyperlipidemia weight loss exercise  low fat diet High levels of lipids (fats) in the blood, including cholesterol and triglycerides, is also called "hyperlipidemia." Hyperlipidemia can significantly increase a person's risk of heart attacks, strokes, and other serious problems. To lower these risks, doctors often recommend that people with hyperlipidemia try to lower their cholesterol levels through a combination of dietary changes, exercise, and medication. Most cholesterol-lowering therapies are aimed at reducing low-density lipoprotein (LDL) or "bad" cholesterol. High levels of LDL can cause atherosclerosis (buildup of fatty deposits in the blood vessels), which is the major cause of cardiovascular events (heart attacks, strokes, and lower extremity or peripheral artery disease).\par This article will discuss the relationship between hyperlipidemia and cardiovascular disease, the different types of lipids, and expert recommendations for lipid screening. Treatment options for high cholesterol are discussed separately. HYPERLIPIDEMIA AND CARDIOVASCULAR DISEASE Hyperlipidemia can significantly increase a person's risk of developing cardiovascular disease, including disease of blood vessels supplying the heart (coronary artery disease), brain (cerebrovascular disease), and limbs (peripheral artery disease). These conditions happen when the blood vessels get clogged with fatty deposits, restricting blood flow. When this happens, it can lead to heart attacks, strokes, and other serious problems such as narrowing of the arteries that deliver blood to most organs.\par Other risk factors for cardiovascular disease — In addition to hyperlipidemia, there are a number of other factors that increase a person's risk of cardiovascular disease:\par ?Diabetes ?High blood pressure (hypertension)?Chronic kidney disease ?Cigarette smoking\par ?Having a parent or sibling who developed cardiovascular disease at a young age (<55 years for men or <65 years for women)\par Regardless of whether any of these factors are present, a person's risk for developing cardiovascular disease increases with age. Men have a higher risk than women at any age.\par Calculating your risk of cardiovascular disease — There are various online calculators that allow you to input information about yourself in order to estimate your risk of developing cardiovascular disease. Different calculators can give different scores depending on the variables they use in calculating a person's risk. Examples include:\par ?Walters 2008 data for men ?Walters 2008 data for women?American College of Cardiology/American Heart Association 2013 data for men and women Your health care provider can help you understand how to use the available calculators to better understand your risk and interpret the results.\par TYPES OF LIPIDSThe term "lipids" includes cholesterol and triglycerides, although there are other types of lipids, too. Standard lipid blood tests include a measurement of total cholesterol, low-density lipoprotein (LDL) and high-density lipoprotein (HDL) cholesterol, and triglycerides.\par Total cholesterol — A high total cholesterol level can increase your risk of cardiovascular disease. However, decisions about when to treat high cholesterol are usually based upon the level of LDL or HDL cholesterol rather than the level of total cholesterol . In general:\par ?A total cholesterol level of less than 200 mg/dL (5.17 mmol/L) is normal.\par ?A total cholesterol level of 200 to 239 mg/dL (5.17 to 6.18 mmol/L) is borderline high.\par ?A total cholesterol level of 240 mg/dL (6.21 mmol/L) or greater is high.\par The total cholesterol level can be measured any time of day. It is not necessary to fast (ie, avoid eating) before testing.\par LDL cholesterol — This is sometimes called "bad" cholesterol, as high LDL levels raise your risk of cardiovascular disease. Some health care providers make decisions about how to treat hyperlipidemia based on the LDL cholesterol level. Your goal LDL cholesterol depends on your overall risk for a cardiovascular event (heart attack or stroke). Several factors affect your personal risk, including whether you have a history of cardiovascular disease and your risk of developing cardiovascular disease in the future (based on your age, sex, and other major risk factors)People at higher risk are often given a lower LDL cholesterol goal.\par If your health care provider plans to measure your LDL cholesterol level, he or she may ask you to fast (avoid eating) for nine hours or longer in order to obtain an accurate result. A fasting test is more important if you have elevated triglycerides (>200 mg/dL) (see ) or when your health care provider plans to measure your fasting blood sugar (glucose). However, in many cases, your LDL cholesterol can be measured even after you have eaten recently.\par HDL cholesterol — Not all cholesterol is bad. High levels of HDL ("good") cholesterol actually lower your risk of cardiovascular disease. A level of 60 mg/dL (1.55 mmol/L) or higher is excellent, while levels of HDL cholesterol less than 40 mg/dL (1.03 mmol/L) are considered lower than desirable. There is no treatment that lowers your risk for a cardiovascular event by raising HDL cholesterol. \par As with total cholesterol, the HDL cholesterol can be measured with a blood test at any time, regardless of whether you have been fasting.\par Non-HDL cholesterol — "Non-HDL" cholesterol includes LDL cholesterol as well as other types of plaque-forming lipids that do not fall into these categories. Non-HDL cholesterol accounts for the cholesterol carried by very low density lipoproteins (VLDL), intermediate density lipoproteins (IDL), and lipoprotein (a). It can be calculated by subtracting HDL cholesterol from total cholesterol. Since total cholesterol and HDL cholesterol can be measured accurately without fasting, so can non-HDL cholesterol. Non-HDL cholesterol is generally considered a better predictor of cardiovascular risk than LDL cholesterol.\par An appropriate non-HDL cholesterol goal can be calculated by adding 30 mg/dL (0.78 mmol/L) to your LDL cholesterol goal. As discussed, the LDL cholesterol goal depends on a number of factors. .\par 6.  hpn continue  magnesium and low salt diet \par

## 2020-07-16 NOTE — PHYSICAL EXAM
[Normal Sclera/Conjunctiva] : normal sclera/conjunctiva [PERRL] : pupils equal round and reactive to light [Normal Oropharynx] : the oropharynx was normal [Supple] : supple [No Edema] : there was no peripheral edema [No Extremity Clubbing/Cyanosis] : no extremity clubbing/cyanosis [Normal Posterior Cervical Nodes] : no posterior cervical lymphadenopathy [Normal Anterior Cervical Nodes] : no anterior cervical lymphadenopathy [Normal Scalp] : inspection of the scalp showed no abnormalities [Complexion Fair] : fair complexion [Examination Of The Hair] : texture and distribution of hair was normal [Comprehensive Foot Exam Normal] : Right and left foot were examined and both feet are normal. No ulcers in either foot. Toes are normal and with full ROM.  Normal tactile sensation with monofilament testing throughout both feet [Normal] : affect was normal and insight and judgment were intact [de-identified] : joint deformity of feet and hands.

## 2020-07-16 NOTE — HEALTH RISK ASSESSMENT
[] : No [No] : No [de-identified] : cardiology Dr Anne [No falls in past year] : Patient reported no falls in the past year [de-identified] : diabetic  [de-identified] : walking

## 2020-07-16 NOTE — COUNSELING
[Sleep ___ hours/day] : Sleep [unfilled] hours/day [Engage in a relaxing activity] : Engage in a relaxing activity [Plan in advance] : Plan in advance [Good understanding] : Patient has a good understanding of lifestyle changes and steps needed to achieve self management goal [None] : None

## 2020-09-21 ENCOUNTER — APPOINTMENT (OUTPATIENT)
Dept: INTERNAL MEDICINE | Facility: CLINIC | Age: 70
End: 2020-09-21
Payer: MEDICARE

## 2020-09-21 VITALS
SYSTOLIC BLOOD PRESSURE: 153 MMHG | HEART RATE: 64 BPM | OXYGEN SATURATION: 97 % | WEIGHT: 186 LBS | TEMPERATURE: 97.5 F | HEIGHT: 71 IN | DIASTOLIC BLOOD PRESSURE: 86 MMHG | BODY MASS INDEX: 26.04 KG/M2

## 2020-09-21 VITALS — DIASTOLIC BLOOD PRESSURE: 89 MMHG | SYSTOLIC BLOOD PRESSURE: 154 MMHG

## 2020-09-21 DIAGNOSIS — C44.519 BASAL CELL CARCINOMA OF SKIN OF OTHER PART OF TRUNK: ICD-10-CM

## 2020-09-21 DIAGNOSIS — I73.00 RAYNAUD'S SYNDROME W/OUT GANGRENE: ICD-10-CM

## 2020-09-21 PROCEDURE — 99214 OFFICE O/P EST MOD 30 MIN: CPT

## 2020-09-21 NOTE — PHYSICAL EXAM
[PERRL] : pupils equal round and reactive to light [Normal Oropharynx] : the oropharynx was normal [No Lymphadenopathy] : no lymphadenopathy [Normal Rate] : normal rate  [Regular Rhythm] : with a regular rhythm [Normal S1, S2] : normal S1 and S2 [No Edema] : there was no peripheral edema [No Extremity Clubbing/Cyanosis] : no extremity clubbing/cyanosis [Normal Posterior Cervical Nodes] : no posterior cervical lymphadenopathy [Normal Anterior Cervical Nodes] : no anterior cervical lymphadenopathy [Normal Scalp] : inspection of the scalp showed no abnormalities [Examination Of The Hair] : texture and distribution of hair was normal [Complexion Fair] : fair complexion [Normal] : affect was normal and insight and judgment were intact

## 2020-09-21 NOTE — HEALTH RISK ASSESSMENT
[] : No [No falls in past year] : Patient reported no falls in the past year [de-identified] : walks  [de-identified] : daibetic

## 2020-09-21 NOTE — END OF VISIT
[FreeTextEntry3] : resident present [>50% of the face to face encounter time was spent on counseling and/or coordination of care for ___] : Greater than 50% of the face to face encounter time was spent on counseling and/or coordination of care for [unfilled]

## 2020-09-21 NOTE — COUNSELING
[Fall prevention counseling provided] : Fall prevention counseling provided [Adequate lighting] : Adequate lighting [No throw rugs] : No throw rugs [Use proper foot wear] : Use proper foot wear [Use recommended devices] : Use recommended devices [Sleep ___ hours/day] : Sleep [unfilled] hours/day [Engage in a relaxing activity] : Engage in a relaxing activity [Plan in advance] : Plan in advance [____ min/wk Activity] : [unfilled] min/wk activity

## 2020-09-21 NOTE — HISTORY OF PRESENT ILLNESS
[FreeTextEntry1] : hypertension , RA , psoriasis , diabetes  [de-identified] : Pt is feeling well and has  better control with his diabetes. he is trying not to cheat .  He is walking .  He has lost 3 lbs.  He has stable psoriasis  and one area on left ankle is  not resolved .  She doesn’t have headaches or dizziness.   he has an elevated ldl  119 and optimal  is 70 in diabetes .  he doesn’t want to go on medication he wants to continue diet and low fat  .

## 2020-09-21 NOTE — ASSESSMENT
[FreeTextEntry1] : 1 dm- ---The following has been discussed:---\par -Targets for weight and HgA1c have been discussed with patient \par -FS goals have been reviewed with the patient in detail:\par AM <130 post meal<160-180\par -Diet and weight goals have been discussed with the patient in detail.\par -The importance of exercise in the treatment of diabetes has been discussed \par with the patient in detail.\par -Extensive dietary advice provided to patient and the need to avoid concentrated \par sweets/simple carbohydrates and to ensure to consume protein with each meal. \par -Patient instructed to limit carbohydrates to 60 gms per meal and 15 gms per \par snacks. \par -Patient to keep a blood sugar log to check fasting and before meals\par -Patient instructed on importance of daily feet inspection and to reports any \par open lesions to physician promptly\par \par 2 hyperlipdemia low fat diet High levels of lipids (fats) in the blood, including cholesterol and triglycerides, is also called "hyperlipidemia." Hyperlipidemia can significantly increase a person's risk of heart attacks, strokes, and other serious problems. To lower these risks, doctors often recommend that people with hyperlipidemia try to lower their cholesterol levels through a combination of dietary changes, exercise, and medication. Most cholesterol-lowering therapies are aimed at reducing low-density lipoprotein (LDL) or "bad" cholesterol. High levels of LDL can cause atherosclerosis (buildup of fatty deposits in the blood vessels), which is the major cause of cardiovascular events (heart attacks, strokes, and lower extremity or peripheral artery disease).\par This article will discuss the relationship between hyperlipidemia and cardiovascular disease, the different types of lipids, and expert recommendations for lipid screening. Treatment options for high cholesterol are discussed separately. HYPERLIPIDEMIA AND CARDIOVASCULAR DISEASE Hyperlipidemia can significantly increase a person's risk of developing cardiovascular disease, including disease of blood vessels supplying the heart (coronary artery disease), brain (cerebrovascular disease), and limbs (peripheral artery disease). These conditions happen when the blood vessels get clogged with fatty deposits, restricting blood flow. When this happens, it can lead to heart attacks, strokes, and other serious problems such as narrowing of the arteries that deliver blood to most organs.\par Other risk factors for cardiovascular disease — In addition to hyperlipidemia, there are a number of other factors that increase a person's risk of cardiovascular disease:\par ?Diabetes ?High blood pressure (hypertension)?Chronic kidney disease ?Cigarette smoking\par ?Having a parent or sibling who developed cardiovascular disease at a young age (<55 years for men or <65 years for women)\par Regardless of whether any of these factors are present, a person's risk for developing cardiovascular disease increases with age. Men have a higher risk than women at any age.\par Calculating your risk of cardiovascular disease — There are various online calculators that allow you to input information about yourself in order to estimate your risk of developing cardiovascular disease. Different calculators can give different scores depending on the variables they use in calculating a person's risk. Examples include:\par ?[place] 2008 data for men ?[place] 2008 data for women?American College of Cardiology/American Heart Association 2013 data for men and women Your health care provider can help you understand how to use the available calculators to better understand your risk and interpret the results.\par TYPES OF LIPIDSThe term "lipids" includes cholesterol and triglycerides, although there are other types of lipids, too. Standard lipid blood tests include a measurement of total cholesterol, low-density lipoprotein (LDL) and high-density lipoprotein (HDL) cholesterol, and triglycerides.\par Total cholesterol — A high total cholesterol level can increase your risk of cardiovascular disease. However, decisions about when to treat high cholesterol are usually based upon the level of LDL or HDL cholesterol rather than the level of total cholesterol . In general:\par ?A total cholesterol level of less than 200 mg/dL (5.17 mmol/L) is normal.\par ?A total cholesterol level of 200 to 239 mg/dL (5.17 to 6.18 mmol/L) is borderline high.\par ?A total cholesterol level of 240 mg/dL (6.21 mmol/L) or greater is high.\par The total cholesterol level can be measured any time of day. It is not necessary to fast (ie, avoid eating) before testing.\par LDL cholesterol — This is sometimes called "bad" cholesterol, as high LDL levels raise your risk of cardiovascular disease. Some health care providers make decisions about how to treat hyperlipidemia based on the LDL cholesterol level. Your goal LDL cholesterol depends on your overall risk for a cardiovascular event (heart attack or stroke). Several factors affect your personal risk, including whether you have a history of cardiovascular disease and your risk of developing cardiovascular disease in the future (based on your age, sex, and other major risk factors)People at higher risk are often given a lower LDL cholesterol goal.\par If your health care provider plans to measure your LDL cholesterol level, he or she may ask you to fast (avoid eating) for nine hours or longer in order to obtain an accurate result. A fasting test is more important if you have elevated triglycerides (>200 mg/dL) (see ) or when your health care provider plans to measure your fasting blood sugar (glucose). However, in many cases, your LDL cholesterol can be measured even after you have eaten recently.\par HDL cholesterol — Not all cholesterol is bad. High levels of HDL ("good") cholesterol actually lower your risk of cardiovascular disease. A level of 60 mg/dL (1.55 mmol/L) or higher is excellent, while levels of HDL cholesterol less than 40 mg/dL (1.03 mmol/L) are considered lower than desirable. There is no treatment that lowers your risk for a cardiovascular event by raising HDL cholesterol. \par As with total cholesterol, the HDL cholesterol can be measured with a blood test at any time, regardless of whether you have been fasting.\par Non-HDL cholesterol — "Non-HDL" cholesterol includes LDL cholesterol as well as other types of plaque-forming lipids that do not fall into these categories. Non-HDL cholesterol accounts for the cholesterol carried by very low density lipoproteins (VLDL), intermediate density lipoproteins (IDL), and lipoprotein (a). It can be calculated by subtracting HDL cholesterol from total cholesterol. Since total cholesterol and HDL cholesterol can be measured accurately without fasting, so can non-HDL cholesterol. Non-HDL cholesterol is generally considered a better predictor of cardiovascular risk than LDL cholesterol.\par An appropriate non-HDL cholesterol goal can be calculated by adding 30 mg/dL (0.78 mmol/L) to your LDL cholesterol goal. As discussed, the LDL cholesterol goal depends on a number of factors. Triglycerides\par 3.  hyp- \par DIETARY SALT (SODIUM); DASH DIET AND BLOOD PRESSURE:\par To decrease the sodium in your diet: \par · Use fresh vegetables and foods as much as possible.\par · Avoid canned and processed foods. Cured meats such as andrews, ham, and sausages are high in salt.\par · Try using different herbs and spices in your cooking instead of salt.\par · In restaurants, avoid foods with sauces, cheese, and cured meats. Ask for low-sodium choices.\par To get more potassium in your diet, eat:\par · Bananas, fresh or dried apricots, peaches, citrus fruits, melons\par · Cauliflower, broccoli, tomatoes, carrots, raw spinach, beet greens, potatoes\par To get more magnesium in your diet, eat:\par · Whole grain foods, leafy green vegetables, dried fruits\par • Fish and seafood, poultry \par To get more calcium in your diet, eat:\par · Nonfat milk, yogurt, and low-fat cheeses \par · [place] and sardines\par · Cooked dried beans\par · Broccoli, kale, and bok tosha\par · Tofu or soybean curd\par DASH stands for "dietary approaches to stop hypertension." The DASH diet is low in total and saturated fat. It is rich in fruits, vegetables, and low-fat dairy foods. The diet allows you to get natural fiber, calcium, and magnesium from food. It prevents or lowers high blood pressure. It can also help lower cholesterol in your blood. \par Don't change how you eat all at once. It's much more likely that you'll succeed if you make only one or two small changes at a time. Wait until those changes are a habit, then make a couple more changes. Some good starting steps include: \par Add one serving of vegetables to your meals at lunch and dinner. This is an easy way to help you get more vegetables in your diet. \par Have a piece of fruit as an afternoon or after-dinner snack. One glass of juice at breakfast is not enough fruit in your diet. \par Use half your usual amount of butter, margarine, or salad dressing. \par Buy nonfat salad dressing or nonfat sour cream.\par Follow this guide to select your menu of meals. The number of calories we want you to eat each day will tell you how many servings you can choose from each food group.\par Calories: 1600 2100 2600 3100 Servings Grains 6 7 ½ 10 ½ 12 ½ Vegetables 	 4 4 ½ 5 6 Fruit 4 5 5 6 Dairy (low-fat) 2 ½ 3 3 3 ½ Meat, poultry, fish ½ 1 ½ 2 2 ½ Nuts, seeds ½ ½ ½ 1 Fats and sweets 1 ½ 2 ½ 3 4\par Grains and grain products like breads and cereals provide energy, fiber and vitamins. Whole grains have more of these nutrients. One serving equals one of the following:\par Bagel, 1/2 medium; Barley, cooked 1/2 cup; Biscuit, country style 1 medium; Bread, whole wheat, white 1 slice; Cereals, cold or cooked, 1/2 cup; Cornbread, 1 medium piece; Crackers, delma, 2; Crackers, saltine, 4; Dinner roll, 1medium; English muffin, ½; Hamburger bun, ½; Muffin, 1 medium; Pancake, 1 medium; Pasta, 1/2 cup cooked; [place], 1/2 large or 1 small; Popcorn, 1 cup popped; Pretzels, 1 ounce; Rice, white, brown, or wild, 1/2 cup cooked; Tortillas, corn or flour, 1 medium; Waffle, 1 medium; Wheat germ, 1/4 cup; \par Vegetables are rich sources of potassium, magnesium, and fiber. One serving is 1/2 cup of any of the following cooked vegetables:\par Asparagus, Beans (green, yellow), Beets, Broccoli, [place] Sprouts, Carrots, Cauliflower, [place], chicory, mustard and turnip (and other) greens, Corn, Kale, Lima beans, Mixed vegetables, Okra, Parsnips, Peas, green, Potatoes (1/2 medium or 1/2 cup mashed), Pumpkin, Rutabaga, Spaghetti or tomato sauce, Spinach, Squash (zucchini or yellow), Stewed tomatoes, Su[place]otash, Sweet potatoes, Turnips, Yam \par Raw vegetables: Carrots,1/2 cup; Celery, 1/2 cup; Lettuce (clif, loose-leaf, green-leaf), 1 cup; Peppers, 1/2 cup; Spinach, 1 cup; Tomato, 1/2\par Fruits and fruit juices are important sources of potassium and magnesium. Fruits also contain fiber and are low in sodium and fat. One serving equals:\par Any fruit juice, # cup (6 ounces); Canned or frozen fruit, ½ cup (includes applesauce); Dried fruit, ¼ cup; \par Fresh fruit:\par Apple, 1 medium; Apricots, 2 medium; Banana, 1 medium; Berries, 1/2 cup; Melon, 1 wedge, or 1/2 cup; Cherries, 10; Grapefruit, 1/2; Grapes, 15; Kiwi, 1 medium; [place], 1/2 small; Nectarine, 1 medium; Orange, 1 medium; Peach, 1 medium; Pear, 1 medium; Pineapple, 1/2 cup; Plums, 2 medium; Tangerine, 1 large\par Dairy foods provide protein and calcium. Use low-fat or nonfat dairy products to cut down on fat. One serving equals:\par Skim milk, 1 cup (8 ounces); 1% low fat milk, 1 cup (8 ounces); 2% low fat milk, 1 cup (8 ounces) nonfat dry milk powder (1/3 cup); Low-fat cottage cheese, 1 cup (8 ounces); Parmesan cheese, 1 tablespoon; Mozzarella cheese, part skim, 1/4 cup (1 ounce); Low-fat cheddar cheese, 11/2 ounces; Ricotta cheese, part skim milk or nonfat, 1/4 cup (11/2 ounces); Other low fat or nonfat cheeses (11/2 oz.); Low-fat or nonfat yogurt, fruit-flavored or plain, 1 cup (8 ounces)\par Low-fat or nonfat frozen yogurt, 1/2 cup (4 ounces); Note: People who can't digest dairy products can try taking lactase enzyme pills or drops (available at drug and grocery stores) when they eat dairy. There is also milk available with the enzyme already added. Or you can buy lactose-free milk.\par Meat, poultry, and fish are good sources of protein and magnesium. One serving equals:\par Lean meat including beef, veal, or pork, 3 ounces cooked; Skinless, white meat poultry including turkey, chicken, 3 ounces; Fish and shellfish, 3 ounces cooked; Low-fat luncheon meats, 1 ounce; Egg, 1 medium; Tofu, 3 ounces\par Note: Three ounces of cooked meat is about the size of a deck of cards.\par Nuts, seeds, and legumes are rich sources of energy, magnesium, potassium, protein and fiber. Nuts and seeds are also high in fat, so portions should be small.\par Almonds, 1/3 cup; Beans such as kidney, olivares, and navy, 1/2 cup cooked; Chickpeas and lentils, 1/2 cup cooked; Cashews, 1/3 cup; Filberts, 1/3 cup; Mixed nuts, 1/3 cup; Peanut butter, 2 tablespoons; Peanuts, 1/3 cup; Sesame seeds, 2 tablespoons; Sunflower seeds, 2 tablespoons; Tofu, regular, 3 ounces; Walnuts, 1/3 cup \par Following the above diet will give you about 27% fat in your diet. The goal is to have 30% or less of the calories you eat each day be from fat. To meet that goal, do not eat more than 2-3 servings daily of added fat. Also try to limit sweets. One serving equals:\par Butter or margarine, 1 teaspoon; Mayonnaise, 1 teaspoon; Low-fat mayonnaise, 1 tablespoon; Salad dressing, 1 tablespoon; Low-fat salad dressing, 2 tablespoons; Oil, 1 teaspoon (use olive, canola, safflower, or other vegetable oils); Candy, hard, 3 pieces; Jelly or jam, 1 tablespoon); Jell-O, 1/2 cup; Jelly beans, 1/2 ounce; Maple syrup, 1 tablespoon; Popsicle, 1; Sherbet or nonfat or low-fat frozen yogurt, 1/2 cup; Sugar, 1 tablespoon; Sugared lemonade or fruit punch, 1 cup (8 ounces); Note: Try diet fruit-flavored gelatin or frozen, canned, or fresh fruit for dessert.\par \par Small amounts of alcohol may have benefits to the heart and blood pressure. However, excess use of alcohol can cause damage to the brain, liver and other organs. It can lead to high blood pressure. Drinking more than two drinks (15 ml) every day can raise your blood pressure. 15 ml of alcohol equals: \par • one 12-ounce bottle of beer \par • a half glass (5 ounces) of wine \par • 1 ounce (one shot) of 100 proof hard liquor\par \par 4.  skin cancer  dermatology surveillance.  \par 5 psoriasis  ointment given for local  resistant  area.  \par 6 fatty liver - Fatty Liver: The patient denies any jaundice or pruritus. The patient denies any alcohol use. The patient denies taking large doses of nonsteroidal anti-inflammatory drugs or acetaminophen. The findings are suggestive of fatty liver. The patient and I had a long discussion regarding the risks of fatty liver progressing to cirrhosis. The patient was told of the possible increased risk of developing liver failure, cirrhosis, ascites, GI bleeding secondary to varices, hepatic encephalopathy, bleeding tendencies and liver cancer. The patient was told of the importance of follow-up. The patient was advised to follow up every 6 months for blood work and imaging studies. The patient agreed and will follow up. The patient was advised to lose weight. I recommend a trial of vitamin E supplementation for the fatty liver. If the liver enzymes remain elevated, the patient may require a trial of Pioglitazone for the fatty liver. I recommend avoid alcohol and hepato-toxic agents. The patient was also advised to avoid NSAIDs, Acetaminophen and any other hepatotoxic drugs. The patient was also advised not to share needles, razors, scissors, nail clippers, etc.. The patient is to continue close follow-up in our office for blood work and exams. If the liver enzymes remain elevated, the patient may require a CT guided liver biopsy to assess the liver parenchyma and for possible treatment. We had a long discussion regarding the risks and benefits of the procedure. The patient was told of the risks of bleeding, perforation, infections, emergency surgery and missing lesions. The patient agreed and will follow-up to reassess the symptoms.\par us of absed stable has renal cysts  prior had only one \par 7 pit adenoma stable \par \par \par \par

## 2020-09-22 LAB
ALBUMIN SERPL ELPH-MCNC: 4.8 G/DL
ALP BLD-CCNC: 103 U/L
ALT SERPL-CCNC: 13 U/L
ANION GAP SERPL CALC-SCNC: 14 MMOL/L
APPEARANCE: CLEAR
AST SERPL-CCNC: 15 U/L
BACTERIA: NEGATIVE
BILIRUB SERPL-MCNC: 0.4 MG/DL
BILIRUBIN URINE: NEGATIVE
BLOOD URINE: NEGATIVE
BUN SERPL-MCNC: 17 MG/DL
CALCIUM SERPL-MCNC: 9.2 MG/DL
CHLORIDE SERPL-SCNC: 104 MMOL/L
CHOLEST SERPL-MCNC: 196 MG/DL
CHOLEST/HDLC SERPL: 3.4 RATIO
CO2 SERPL-SCNC: 26 MMOL/L
COLOR: YELLOW
CREAT SERPL-MCNC: 1.3 MG/DL
CREAT SPEC-SCNC: 296 MG/DL
ESTIMATED AVERAGE GLUCOSE: 126 MG/DL
FRUCTOSAMINE SERPL-MCNC: 233 UMOL/L
GLUCOSE QUALITATIVE U: NEGATIVE
GLUCOSE SERPL-MCNC: 125 MG/DL
HBA1C MFR BLD HPLC: 6 %
HDLC SERPL-MCNC: 58 MG/DL
HYALINE CASTS: 2 /LPF
KETONES URINE: NORMAL
LDLC SERPL CALC-MCNC: 110 MG/DL
LEUKOCYTE ESTERASE URINE: NEGATIVE
MICROALBUMIN 24H UR DL<=1MG/L-MCNC: 2.6 MG/DL
MICROALBUMIN/CREAT 24H UR-RTO: 9 MG/G
MICROSCOPIC-UA: NORMAL
NITRITE URINE: NEGATIVE
PH URINE: 6
POTASSIUM SERPL-SCNC: 4.4 MMOL/L
PROT SERPL-MCNC: 6.7 G/DL
PROTEIN URINE: NORMAL
RED BLOOD CELLS URINE: 5 /HPF
SODIUM SERPL-SCNC: 144 MMOL/L
SPECIFIC GRAVITY URINE: 1.03
SQUAMOUS EPITHELIAL CELLS: 1 /HPF
TRIGL SERPL-MCNC: 142 MG/DL
UROBILINOGEN URINE: NORMAL
WHITE BLOOD CELLS URINE: 1 /HPF

## 2020-09-28 ENCOUNTER — APPOINTMENT (OUTPATIENT)
Dept: UROLOGY | Facility: CLINIC | Age: 70
End: 2020-09-28

## 2020-10-19 ENCOUNTER — RX RENEWAL (OUTPATIENT)
Age: 70
End: 2020-10-19

## 2020-10-29 ENCOUNTER — APPOINTMENT (OUTPATIENT)
Dept: UROLOGY | Facility: CLINIC | Age: 70
End: 2020-10-29
Payer: MEDICARE

## 2020-10-29 PROCEDURE — 99214 OFFICE O/P EST MOD 30 MIN: CPT

## 2020-11-01 NOTE — HISTORY OF PRESENT ILLNESS
[FreeTextEntry1] : 66 yo M recently evaluated by PCP, found to have an enlarged prostate on physical exam. Here for routine check-up. Pt denies any significant LUTS, gross hematuria, dysuria. AUAss is 0. Erections are not as rigid as before but adequate for penetration. \par \par 3/2/20 Interval history: Pt hasn't been seen since Jan 2018\par Still no significant urinary issues\par Has been having ED issues - lately not adequate for penetration\par Has tried cialis in the past\par \par 10/29/20 Interval history: Referred for asymptomatic microhematuria\par No gross hematuria, no dysuria\par No flank pain

## 2020-11-01 NOTE — ASSESSMENT
[FreeTextEntry1] : 70 yo M with microhematuria\par \par - Reviewed labork done by PCP\par - Discussed possible etiologies for microhematuria including benign (UTI, nephrolithiasis, cyst, BPH) vs malignancy (renal, ureteral and bladder). Discussed hematuria workup which includes CT urogram and cystoscopy. Discussed risk and benefits of cystoscopy.\par

## 2020-11-04 ENCOUNTER — OUTPATIENT (OUTPATIENT)
Dept: OUTPATIENT SERVICES | Facility: HOSPITAL | Age: 70
LOS: 1 days | End: 2020-11-04
Payer: MEDICARE

## 2020-11-04 ENCOUNTER — RESULT REVIEW (OUTPATIENT)
Age: 70
End: 2020-11-04

## 2020-11-04 ENCOUNTER — APPOINTMENT (OUTPATIENT)
Dept: CT IMAGING | Facility: HOSPITAL | Age: 70
End: 2020-11-04
Payer: MEDICARE

## 2020-11-04 DIAGNOSIS — R31.29 OTHER MICROSCOPIC HEMATURIA: ICD-10-CM

## 2020-11-04 PROCEDURE — 74178 CT ABD&PLV WO CNTR FLWD CNTR: CPT | Mod: 26

## 2020-11-04 PROCEDURE — 74178 CT ABD&PLV WO CNTR FLWD CNTR: CPT

## 2020-11-12 ENCOUNTER — APPOINTMENT (OUTPATIENT)
Age: 70
End: 2020-11-12
Payer: MEDICARE

## 2020-11-12 VITALS
SYSTOLIC BLOOD PRESSURE: 131 MMHG | HEART RATE: 73 BPM | BODY MASS INDEX: 25.9 KG/M2 | WEIGHT: 185 LBS | DIASTOLIC BLOOD PRESSURE: 79 MMHG | TEMPERATURE: 97.6 F | HEIGHT: 71 IN

## 2020-11-12 LAB
BUN SERPL-MCNC: 20 MG/DL
CREAT SERPL-MCNC: 1.1 MG/DL

## 2020-11-12 PROCEDURE — 52000 CYSTOURETHROSCOPY: CPT

## 2020-12-21 ENCOUNTER — APPOINTMENT (OUTPATIENT)
Dept: INTERNAL MEDICINE | Facility: CLINIC | Age: 70
End: 2020-12-21
Payer: MEDICARE

## 2020-12-21 VITALS
HEIGHT: 71 IN | DIASTOLIC BLOOD PRESSURE: 79 MMHG | TEMPERATURE: 97.1 F | HEART RATE: 72 BPM | WEIGHT: 187 LBS | SYSTOLIC BLOOD PRESSURE: 113 MMHG | BODY MASS INDEX: 26.18 KG/M2

## 2020-12-21 DIAGNOSIS — K64.8 OTHER HEMORRHOIDS: ICD-10-CM

## 2020-12-21 DIAGNOSIS — M54.9 DORSALGIA, UNSPECIFIED: ICD-10-CM

## 2020-12-21 DIAGNOSIS — Z87.2 PERSONAL HISTORY OF DISEASES OF THE SKIN AND SUBCUTANEOUS TISSUE: ICD-10-CM

## 2020-12-21 DIAGNOSIS — Z86.39 PERSONAL HISTORY OF OTHER ENDOCRINE, NUTRITIONAL AND METABOLIC DISEASE: ICD-10-CM

## 2020-12-21 PROCEDURE — 99214 OFFICE O/P EST MOD 30 MIN: CPT

## 2020-12-21 RX ORDER — BLOOD-GLUCOSE METER
W/DEVICE KIT MISCELLANEOUS
Qty: 1 | Refills: 0 | Status: COMPLETED | COMMUNITY
Start: 2020-06-22 | End: 2020-12-21

## 2020-12-21 RX ORDER — INFLUENZA A VIRUS A/VICTORIA/4897/2022 IVR-238 (H1N1) ANTIGEN (FORMALDEHYDE INACTIVATED), INFLUENZA A VIRUS A/DARWIN/9/2021 SAN-010 (H3N2) ANTIGEN (FORMALDEHYDE INACTIVATED), INFLUENZA B VIRUS B/PHUKET/3073/2013 ANTIGEN (FORMALDEHYDE INACTIVATED), AND INFLUENZA B VIRUS B/MICHIGAN/01/2021 ANTIGEN (FORMALDEHYDE INACTIVATED) 60; 60; 60; 60 UG/.7ML; UG/.7ML; UG/.7ML; UG/.7ML
0.7 INJECTION, SUSPENSION INTRAMUSCULAR
Qty: 1 | Refills: 0 | Status: COMPLETED | COMMUNITY
Start: 2020-09-21 | End: 2020-12-21

## 2020-12-21 NOTE — ASSESSMENT
[FreeTextEntry1] : 1 dm - ---The following has been discussed:---\par -Targets for weight and HgA1c have been discussed with patient \par -FS goals have been reviewed with the patient in detail:\par AM <130 post meal<160-180\par -Diet and weight goals have been discussed with the patient in detail.\par -The importance of exercise in the treatment of diabetes has been discussed \par with the patient in detail.\par -Extensive dietary advice provided to patient and the need to avoid concentrated \par sweets/simple carbohydrates and to ensure to consume protein with each meal. \par -Patient instructed to limit carbohydrates to 60 gms per meal and 15 gms per \par snacks. \par -Patient to keep a blood sugar log to check fasting and before meals\par -Patient instructed on importance of daily feet inspection and to reports any \par open lesions to physician promptly\par \par 2. hyperlipidemia -  High levels of lipids (fats) in the blood, including cholesterol and triglycerides, is also called "hyperlipidemia." Hyperlipidemia can significantly increase a person's risk of heart attacks, strokes, and other serious problems. To lower these risks, doctors often recommend that people with hyperlipidemia try to lower their cholesterol levels through a combination of dietary changes, exercise, and medication. Most cholesterol-lowering therapies are aimed at reducing low-density lipoprotein (LDL) or "bad" cholesterol. High levels of LDL can cause atherosclerosis (buildup of fatty deposits in the blood vessels), which is the major cause of cardiovascular events (heart attacks, strokes, and lower extremity or peripheral artery disease).\par This article will discuss the relationship between hyperlipidemia and cardiovascular disease, the different types of lipids, and expert recommendations for lipid screening. Treatment options for high cholesterol are discussed separately. HYPERLIPIDEMIA AND CARDIOVASCULAR DISEASE Hyperlipidemia can significantly increase a person's risk of developing cardiovascular disease, including disease of blood vessels supplying the heart (coronary artery disease), brain (cerebrovascular disease), and limbs (peripheral artery disease). These conditions happen when the blood vessels get clogged with fatty deposits, restricting blood flow. When this happens, it can lead to heart attacks, strokes, and other serious problems such as narrowing of the arteries that deliver blood to most organs.\par Other risk factors for cardiovascular disease — In addition to hyperlipidemia, there are a number of other factors that increase a person's risk of cardiovascular disease:\par ?Diabetes ?High blood pressure (hypertension)?Chronic kidney disease ?Cigarette smoking\par ?Having a parent or sibling who developed cardiovascular disease at a young age (<55 years for men or <65 years for women)\par Regardless of whether any of these factors are present, a person's risk for developing cardiovascular disease increases with age. Men have a higher risk than women at any age.\par Calculating your risk of cardiovascular disease — There are various online calculators that allow you to input information about yourself in order to estimate your risk of developing cardiovascular disease. Different calculators can give different scores depending on the variables they use in calculating a person's risk. Examples include:\par ?Flint 2008 data for men ?Flint 2008 data for women?American College of Cardiology/American Heart Association 2013 data for men and women Your health care provider can help you understand how to use the available calculators to better understand your risk and interpret the results.\par TYPES OF LIPIDSThe term "lipids" includes cholesterol and triglycerides, although there are other types of lipids, too. Standard lipid blood tests include a measurement of total cholesterol, low-density lipoprotein (LDL) and high-density lipoprotein (HDL) cholesterol, and triglycerides.\par Total cholesterol — A high total cholesterol level can increase your risk of cardiovascular disease. However, decisions about when to treat high cholesterol are usually based upon the level of LDL or HDL cholesterol rather than the level of total cholesterol . In general:\par ?A total cholesterol level of less than 200 mg/dL (5.17 mmol/L) is normal.\par ?A total cholesterol level of 200 to 239 mg/dL (5.17 to 6.18 mmol/L) is borderline high.\par ?A total cholesterol level of 240 mg/dL (6.21 mmol/L) or greater is high.\par The total cholesterol level can be measured any time of day. It is not necessary to fast (ie, avoid eating) before testing.\par LDL cholesterol — This is sometimes called "bad" cholesterol, as high LDL levels raise your risk of cardiovascular disease. Some health care providers make decisions about how to treat hyperlipidemia based on the LDL cholesterol level. Your goal LDL cholesterol depends on your overall risk for a cardiovascular event (heart attack or stroke). Several factors affect your personal risk, including whether you have a history of cardiovascular disease and your risk of developing cardiovascular disease in the future (based on your age, sex, and other major risk factors)People at higher risk are often given a lower LDL cholesterol goal.\par If your health care provider plans to measure your LDL cholesterol level, he or she may ask you to fast (avoid eating) for nine hours or longer in order to obtain an accurate result. A fasting test is more important if you have elevated triglycerides (>200 mg/dL) (see ) or when your health care provider plans to measure your fasting blood sugar (glucose). However, in many cases, your LDL cholesterol can be measured even after you have eaten recently.\par HDL cholesterol — Not all cholesterol is bad. High levels of HDL ("good") cholesterol actually lower your risk of cardiovascular disease. A level of 60 mg/dL (1.55 mmol/L) or higher is excellent, while levels of HDL cholesterol less than 40 mg/dL (1.03 mmol/L) are considered lower than desirable. There is no treatment that lowers your risk for a cardiovascular event by raising HDL cholesterol. \par As with total cholesterol, the HDL cholesterol can be measured with a blood test at any time, regardless of whether you have been fasting.\par Non-HDL cholesterol — "Non-HDL" cholesterol includes LDL cholesterol as well as other types of plaque-forming lipids that do not fall into these categories. Non-HDL cholesterol accounts for the cholesterol carried by very low density lipoproteins (VLDL), intermediate density lipoproteins (IDL), and lipoprotein (a). It can be calculated by subtracting HDL cholesterol from total cholesterol. Since total cholesterol and HDL cholesterol can be measured accurately without fasting, so can non-HDL cholesterol. Non-HDL cholesterol is generally considered a better predictor of cardiovascular risk than LDL cholesterol.\par An appropriate non-HDL cholesterol goal can be calculated by adding 30 mg/dL (0.78 mmol/L) to your LDL cholesterol goal. As discussed, the LDL cholesterol goal depends on a number of factors. \par 3 psoriasis  will give him cream for his psoriasis - He is on medications and will avoid contact with irritated skin  with objects and keep clean  if he sees any ozzing he is to  let me know .  He should fu with his derm or rheumatologist \par 4 Fatty Liver: The patient denies any jaundice or pruritus. The patient denies any alcohol use. The patient denies taking large doses of nonsteroidal anti-inflammatory drugs or acetaminophen. The findings are suggestive of fatty liver. The patient and I had a long discussion regarding the risks of fatty liver progressing to cirrhosis. The patient was told of the possible increased risk of developing liver failure, cirrhosis, ascites, GI bleeding secondary to varices, hepatic encephalopathy, bleeding tendencies and liver cancer. The patient was told of the importance of follow-up. The patient was advised to follow up every 6 months for blood work and imaging studies. The patient agreed and will follow up. The patient was advised to lose weight. I recommend a trial of vitamin E supplementation for the fatty liver. If the liver enzymes remain elevated, the patient may require a trial of Pioglitazone for the fatty liver. I recommend avoid alcohol and hepato-toxic agents. The patient was also advised to avoid NSAIDs, Acetaminophen and any other hepatotoxic drugs. The patient was also advised not to share needles, razors, scissors, nail clippers, etc.. The patient is to continue close follow-up in our office for blood work and exams. If the liver enzymes remain elevated, the patient may require a CT guided liver biopsy to assess the liver parenchyma and for possible treatment. We had a long discussion regarding the risks and benefits of the procedure. The patient was told of the risks of bleeding, perforation, infections, emergency surgery and missing lesions. The patient agreed and will follow-up to reassess the symptoms.\par 5 ptiit adenoma stable  continue surveillance every 2 yrs.  \par

## 2020-12-21 NOTE — HEALTH RISK ASSESSMENT
[] : No [No] : No [No falls in past year] : Patient reported no falls in the past year [0] : 2) Feeling down, depressed, or hopeless: Not at all (0) [de-identified] : regularly  [de-identified] : diabetic diet low salt  [[id number]] : 0

## 2020-12-21 NOTE — PHYSICAL EXAM
[Normal Sclera/Conjunctiva] : normal sclera/conjunctiva [PERRL] : pupils equal round and reactive to light [Normal Oropharynx] : the oropharynx was normal [Supple] : supple [Normal Rate] : normal rate  [Regular Rhythm] : with a regular rhythm [Normal S1, S2] : normal S1 and S2 [No Edema] : there was no peripheral edema [No Extremity Clubbing/Cyanosis] : no extremity clubbing/cyanosis [Soft] : abdomen soft [No HSM] : no HSM [Normal Posterior Cervical Nodes] : no posterior cervical lymphadenopathy [Normal Anterior Cervical Nodes] : no anterior cervical lymphadenopathy [No Joint Swelling] : no joint swelling [Grossly Normal Strength/Tone] : grossly normal strength/tone [Normal Scalp] : inspection of the scalp showed no abnormalities [Examination Of The Hair] : texture and distribution of hair was normal [Normal] : affect was normal and insight and judgment were intact [Comprehensive Foot Exam Normal] : Right and left foot were examined and both feet are normal. No ulcers in either foot. Toes are normal and with full ROM.  Normal tactile sensation with monofilament testing throughout both feet [de-identified] : arthrtic changes of joints of hand and feet  [de-identified] : arthritisc changes of joint s in feet

## 2020-12-21 NOTE — HISTORY OF PRESENT ILLNESS
[FreeTextEntry1] : fu  hypertension diabetes  [de-identified] : Pt is feeling well and has fu with Dr Lundy and had ct urogram and  had  panniculitis  and enlarged prostate. he is states he is watching his carb intake and 130s and less.  He is exercising on a bike .  He uses it 20 mins a day.  He had us of abd fattly liver , and mri pit in July and is unchanged   He has Recieved flu vaccine and shingles .  he will need covid vaccine.

## 2020-12-22 LAB
25(OH)D3 SERPL-MCNC: 50.2 NG/ML
ALBUMIN SERPL ELPH-MCNC: 4.7 G/DL
ALP BLD-CCNC: 97 U/L
ALT SERPL-CCNC: 12 U/L
ANION GAP SERPL CALC-SCNC: 14 MMOL/L
AST SERPL-CCNC: 13 U/L
BASOPHILS # BLD AUTO: 0.04 K/UL
BASOPHILS NFR BLD AUTO: 0.5 %
BILIRUB SERPL-MCNC: 0.4 MG/DL
BUN SERPL-MCNC: 21 MG/DL
CALCIUM SERPL-MCNC: 9.7 MG/DL
CHLORIDE SERPL-SCNC: 104 MMOL/L
CHOLEST SERPL-MCNC: 210 MG/DL
CO2 SERPL-SCNC: 24 MMOL/L
CREAT SERPL-MCNC: 1.35 MG/DL
CREAT SPEC-SCNC: 221 MG/DL
EOSINOPHIL # BLD AUTO: 0.06 K/UL
EOSINOPHIL NFR BLD AUTO: 0.7 %
ESTIMATED AVERAGE GLUCOSE: 128 MG/DL
FRUCTOSAMINE SERPL-MCNC: 242 UMOL/L
GLUCOSE SERPL-MCNC: 135 MG/DL
HBA1C MFR BLD HPLC: 6.1 %
HCT VFR BLD CALC: 50.5 %
HDLC SERPL-MCNC: 52 MG/DL
HGB BLD-MCNC: 15.9 G/DL
IMM GRANULOCYTES NFR BLD AUTO: 0.4 %
LDLC SERPL CALC-MCNC: 119 MG/DL
LYMPHOCYTES # BLD AUTO: 1.62 K/UL
LYMPHOCYTES NFR BLD AUTO: 20.2 %
MAN DIFF?: NORMAL
MCHC RBC-ENTMCNC: 30.3 PG
MCHC RBC-ENTMCNC: 31.5 GM/DL
MCV RBC AUTO: 96.2 FL
MICROALBUMIN 24H UR DL<=1MG/L-MCNC: 1.4 MG/DL
MICROALBUMIN/CREAT 24H UR-RTO: 6 MG/G
MONOCYTES # BLD AUTO: 0.62 K/UL
MONOCYTES NFR BLD AUTO: 7.7 %
NEUTROPHILS # BLD AUTO: 5.64 K/UL
NEUTROPHILS NFR BLD AUTO: 70.5 %
NONHDLC SERPL-MCNC: 159 MG/DL
PLATELET # BLD AUTO: 214 K/UL
POTASSIUM SERPL-SCNC: 4.5 MMOL/L
PROT SERPL-MCNC: 7 G/DL
RBC # BLD: 5.25 M/UL
RBC # FLD: 15 %
SODIUM SERPL-SCNC: 142 MMOL/L
TRIGL SERPL-MCNC: 195 MG/DL
WBC # FLD AUTO: 8.01 K/UL

## 2021-03-10 ENCOUNTER — RX RENEWAL (OUTPATIENT)
Age: 71
End: 2021-03-10

## 2021-03-17 ENCOUNTER — APPOINTMENT (OUTPATIENT)
Dept: INTERNAL MEDICINE | Facility: CLINIC | Age: 71
End: 2021-03-17
Payer: MEDICARE

## 2021-03-17 VITALS
HEART RATE: 71 BPM | OXYGEN SATURATION: 98 % | TEMPERATURE: 97.2 F | HEIGHT: 71 IN | DIASTOLIC BLOOD PRESSURE: 84 MMHG | SYSTOLIC BLOOD PRESSURE: 143 MMHG | BODY MASS INDEX: 26.6 KG/M2 | WEIGHT: 190 LBS

## 2021-03-17 DIAGNOSIS — I45.10 UNSPECIFIED RIGHT BUNDLE-BRANCH BLOCK: ICD-10-CM

## 2021-03-17 DIAGNOSIS — N40.0 BENIGN PROSTATIC HYPERPLASIA WITHOUT LOWER URINARY TRACT SYMPMS: ICD-10-CM

## 2021-03-17 DIAGNOSIS — Z23 ENCOUNTER FOR IMMUNIZATION: ICD-10-CM

## 2021-03-17 PROCEDURE — 93000 ELECTROCARDIOGRAM COMPLETE: CPT

## 2021-03-17 PROCEDURE — G0439: CPT

## 2021-03-17 PROCEDURE — 99213 OFFICE O/P EST LOW 20 MIN: CPT | Mod: 25

## 2021-03-17 NOTE — PHYSICAL EXAM
[Well Developed] : well developed [Well Nourished] : well nourished [Normal Voice Quality] : was normal [Normal Verbal Skills] : the patient had normal verbal communication skills [Normal Nonverbal Skills] : normal nonverbal communication skills were demonstrated [Conjunctiva] : the conjunctiva were normal in both eyes [PERRL] : pupils were equal in size, round, and reactive to light [EOM Intact] : extraocular movements were intact [Normal Appearance] : was normal in appearance [Neck Supple] : was supple [Rate ___] : at [unfilled] breaths per minute [Normal Rhythm/Effort] : normal respiratory rhythm and effort [Clear Bilaterally] : the lungs were clear to auscultation bilaterally [Normal to Percussion] : the lungs were normal to percussion [5th Left ICS - MCL] : palpated at the 5th LICS in the midclavicular line [Heart Rate ___] : [unfilled] bpm [Rhythm Regular] : regular [Normal Rate] : normal [Normal S1] : normal S1 [Normal S2] : normal S2 [No Murmur] : no murmurs heard [No Pitting Edema] : no pitting edema present [Rt] : varicose veins of the right leg noted [Lt] : varicose veins of the left leg noted [2+] : left 2+ [No Abnormalities] : the abdominal aorta was not enlarged and no bruit was heard [Examination Of The Breasts] : a normal appearance [No Discharge] : no discharge [Soft, Nontender] : the abdomen was soft and nontender [No Mass] : no masses were palpated [No HSM] : no hepatosplenomegaly noted [None] : no CVA tenderness [Penis Abnormality] : normal circumcised penis [Urinary Bladder Findings] : the bladder was normal on palpation [Testes Tenderness] : no tenderness of the testes [Testes Mass (___cm)] : there were no testicular masses [Normal Kyphosis] : normal kyphosis [No Visual Abnormalities] : no visible abnormalities [Normal Lordosis] : normal lordosis [No Scoliosis] : no scoliosis [No Tenderness to Palpation] : no spine tenderness on palpation [No Masses] : no masses [Full ROM] : full ROM [No Pain with ROM] : no pain with motion in any direction [Intact] : all reflexes within normal limits bilaterally [Normal Station and Gait] : the gait and station were normal [Normal Motor Tone] : the muscle tone was normal [Involuntary Movements] : no involuntary movements were seen [Normal Scalp] : inspection of the scalp showed no abnormalities [Examination Of The Hair] : texture and distribution of hair was normal [Complexion Fair] : fair complexion [Skin Lesions 1] : Skin lesion: [Normal] : the deep tendon reflexes were normal [Normal Mental Status] : the patient's orientation, memory, attention, language and fund of knowledge were normal [Appropriate] : appropriate [Comprehensive Foot Exam Normal] : Right and left foot were examined and both feet are normal. No ulcers in either foot. Toes are normal and with full ROM.  Normal tactile sensation with monofilament testing throughout both feet [Enlarged Diffusely] : was not enlarged [JVP Elevated ___cm] : the JVP was not elevated [S3] : no S3 [S4] : no S4 [Right Carotid Bruit] : no bruit heard over the right carotid [Left Carotid Bruit] : no bruit heard over the left carotid [Right Femoral Bruit] : no bruit heard over the right femoral artery [Left Femoral Bruit] : no bruit heard over the left femoral artery [Bruit] : no bruit heard [Cervical Lymph Nodes Enlarged Posterior Bilaterally] : nodes not enlarged [Supraclavicular Lymph Nodes Enlarged Bilaterally] : nodes not enlarged [Axillary Lymph Nodes Enlarged Bilaterally] : nodes not enlarged [Inguinal Lymph Nodes Enlarged Bilaterally] : nodes not enlarged [___/1] : [unfilled]/1   [___/3] : [unfilled]/3 [___/5] : [unfilled]/5 [___/4] : [unfilled]/4 [___/2] : [unfilled]/2 [___/8] : [unfilled]/8 [Mild Neurocognative Disorder] : Mild Neurocognative Disorder [Impaired judgment] : intact judgment [Impaired Insight] : intact insight [de-identified] : teeth fair repair tongue normal   deviated septum to right  [de-identified] : decreased mobility of neck due to arthritis  [TextBox_2] : yes  [TextBox_4] : high school  [TextBox_72] : 21

## 2021-03-17 NOTE — COUNSELING
[Fall prevention counseling provided] : Fall prevention counseling provided [Adequate lighting] : Adequate lighting [No throw rugs] : No throw rugs [Use proper foot wear] : Use proper foot wear [Use recommended devices] : Use recommended devices [Sleep ___ hours/day] : Sleep [unfilled] hours/day [Engage in a relaxing activity] : Engage in a relaxing activity [Plan in advance] : Plan in advance [Potential consequences of obesity discussed] : Potential consequences of obesity discussed [Benefits of weight loss discussed] : Benefits of weight loss discussed [Structured Weight Management Program suggested:] : Structured weight management program suggested [Encouraged to maintain food diary] : Encouraged to maintain food diary [Encouraged to increase physical activity] : Encouraged to increase physical activity [Encouraged to use exercise tracking device] : Encouraged to use exercise tracking device [Target Wt Loss Goal ___] : Weight Loss Goals: Target weight loss goal [unfilled] lbs [Weigh Self Weekly] : weigh self weekly [Decrease Portions] : decrease portions [____ min/wk Activity] : [unfilled] min/wk activity [Keep Food Diary] : keep food diary [FreeTextEntry2] : 156-179 He is 190 bmi 26

## 2021-03-17 NOTE — HEALTH RISK ASSESSMENT
[Excellent] : ~his/her~ current health as excellent [Very Good] : ~his/her~  mood as very good [Yes] : Yes [2 - 3 times a week (3 pts)] : 2 - 3  times a week (3 points) [3 or 4 (1 pt)] : 3 or 4  (1 point) [Never (0 pts)] : Never (0 points) [No] : In the past 12 months have you used drugs other than those required for medical reasons? No [No falls in past year] : Patient reported no falls in the past year [0] : 2) Feeling down, depressed, or hopeless: Not at all (0) [Patient reported bone density results were normal] : Patient reported bone density results were normal [HIV test declined] : HIV test declined [Hepatitis C test offered] : Hepatitis C test offered [None] : None [With Significant Other] : lives with significant other [# of Members in Household ___] :  household currently consist of [unfilled] member(s) [Retired] : retired [High School] : high school [] :  [# Of Children ___] : has [unfilled] children [Sexually Active] : sexually active [Feels Safe at Home] : Feels safe at home [Fully functional (bathing, dressing, toileting, transferring, walking, feeding)] : Fully functional (bathing, dressing, toileting, transferring, walking, feeding) [Fully functional (using the telephone, shopping, preparing meals, housekeeping, doing laundry, using] : Fully functional and needs no help or supervision to perform IADLs (using the telephone, shopping, preparing meals, housekeeping, doing laundry, using transportation, managing medications and managing finances) [Smoke Detector] : smoke detector [Carbon Monoxide Detector] : carbon monoxide detector [Safety elements used in home] : safety elements used in home [Seat Belt] :  uses seat belt [Sunscreen] : uses sunscreen [FreeTextEntry1] : none  [] : No [de-identified] : urology 10/20 [Audit-CScore] : 4 [de-identified] : bike  [de-identified] : diabetic  [[id number]] : 0 [Change in mental status noted] : No change in mental status noted [Language] : denies difficulty with language [Behavior] : denies difficulty with behavior [Learning/Retaining New Information] : denies difficulty learning/retaining new information [Handling Complex Tasks] : denies difficulty handling complex tasks [Reasoning] : denies difficulty with reasoning [Spatial Ability and Orientation] : denies difficulty with spatial ability and orientation [Reports changes in hearing] : Reports no changes in hearing [Reports changes in vision] : Reports no changes in vision [Reports changes in dental health] : Reports no changes in dental health [Guns at Home] : no guns at home [Travel to Developing Areas] : does not  travel to developing areas [TB Exposure] : is not being exposed to tuberculosis [Caregiver Concerns] : does not have caregiver concerns [BoneDensityDate] : 2019 [ColonoscopyDate] : 2013 [HepatitisCDate] : 2019 [de-identified] : last eye exam unsure  [de-identified] : last month  [AdvancecareDate] : 3/21

## 2021-03-17 NOTE — ASSESSMENT
[FreeTextEntry1] : 1health he is up to date with his  vaccines,  He is up to date with bone density , needs eye exam and up to date with dental. he needs colon cancer screening and wants cologuirad or fit.  he didn’t do in summer and will reorder\par 2 bmi 26    Weight loss, exercise, and diet control were discussed and are highly encouraged. Treatment options were given such as, aqua therapy, and contacting a nutritionist. Recommended to use the elliptical, stationary bike, less use of treadmill. Mindful eating was explained to the patient Obesity is associated with worsening asthma, shortness of breath, and potential for cardiac disease, diabetes, and other underlying medical conditions.\par \par 3 pit adenoma  stable and unchaged  he had mri in June 2020  No symptoms noted no visual abnormalities.\par 4.  dm - well controlled ---The following has been discussed:---\par -Targets for weight and HgA1c have been discussed with patient \par -FS goals have been reviewed with the patient in detail:\par AM <130 post meal<160-180\par -Diet and weight goals have been discussed with the patient in detail.\par -The importance of exercise in the treatment of diabetes has been discussed \par with the patient in detail.\par -Extensive dietary advice provided to patient and the need to avoid concentrated \par sweets/simple carbohydrates and to ensure to consume protein with each meal. \par -Patient instructed to limit carbohydrates to 60 gms per meal and 15 gms per \par snacks. \par -Patient to keep a blood sugar log to check fasting and before meals\par -Patient instructed on importance of daily feet inspection and to reports any \par open lesions to physician promptly\par \par 5. psoriatic arthropathy on medication and doing well but had to have fu for his skin.  \par 6 hpn \par DIETARY SALT (SODIUM); DASH DIET AND BLOOD PRESSURE:\par To decrease the sodium in your diet: \par · Use fresh vegetables and foods as much as possible.\par · Avoid canned and processed foods. Cured meats such as andrews, ham, and sausages are high in salt.\par · Try using different herbs and spices in your cooking instead of salt.\par · In restaurants, avoid foods with sauces, cheese, and cured meats. Ask for low-sodium choices.\par To get more potassium in your diet, eat:\par · Bananas, fresh or dried apricots, peaches, citrus fruits, melons\par · Cauliflower, broccoli, tomatoes, carrots, raw spinach, beet greens, potatoes\par To get more magnesium in your diet, eat:\par · Whole grain foods, leafy green vegetables, dried fruits\par • Fish and seafood, poultry \par To get more calcium in your diet, eat:\par · Nonfat milk, yogurt, and low-fat cheeses \par · [place] and sardines\par · Cooked dried beans\par · Broccoli, kale, and bok tosha\par · Tofu or soybean curd\par DASH stands for "dietary approaches to stop hypertension." The DASH diet is low in total and saturated fat. It is rich in fruits, vegetables, and low-fat dairy foods. The diet allows you to get natural fiber, calcium, and magnesium from food. It prevents or lowers high blood pressure. It can also help lower cholesterol in your blood. \par Don't change how you eat all at once. It's much more likely that you'll succeed if you make only one or two small changes at a time. Wait until those changes are a habit, then make a couple more changes. Some good starting steps include: \par Add one serving of vegetables to your meals at lunch and dinner. This is an easy way to help you get more vegetables in your diet. \par Have a piece of fruit as an afternoon or after-dinner snack. One glass of juice at breakfast is not enough fruit in your diet. \par Use half your usual amount of butter, margarine, or salad dressing. \par Buy nonfat salad dressing or nonfat sour cream.\par Follow this guide to select your menu of meals. The number of calories we want you to eat each day will tell you how many servings you can choose from each food group.\par Calories: 1600 2100 2600 3100 Servings Grains 6 7 ½ 10 ½ 12 ½ Vegetables 	 4 4 ½ 5 6 Fruit 4 5 5 6 Dairy (low-fat) 2 ½ 3 3 3 ½ Meat, poultry, fish ½ 1 ½ 2 2 ½ Nuts, seeds ½ ½ ½ 1 Fats and sweets 1 ½ 2 ½ 3 4\par Grains and grain products like breads and cereals provide energy, fiber and vitamins. Whole grains have more of these nutrients. One serving equals one of the following:\par Bagel, 1/2 medium; Barley, cooked 1/2 cup; Biscuit, country style 1 medium; Bread, whole wheat, white 1 slice; Cereals, cold or cooked, 1/2 cup; Cornbread, 1 medium piece; Crackers, delma, 2; Crackers, saltine, 4; Dinner roll, 1medium; English muffin, ½; Hamburger bun, ½; Muffin, 1 medium; Pancake, 1 medium; Pasta, 1/2 cup cooked; Zoraida, 1/2 large or 1 small; Popcorn, 1 cup popped; Pretzels, 1 ounce; Rice, white, brown, or wild, 1/2 cup cooked; Tortillas, corn or flour, 1 medium; Waffle, 1 medium; Wheat germ, 1/4 cup; \par Vegetables are rich sources of potassium, magnesium, and fiber. One serving is 1/2 cup of any of the following cooked vegetables:\par Asparagus, Beans (green, yellow), Beets, Broccoli, [place] Sprouts, Carrots, Cauliflower, Wallace, chicory, mustard and turnip (and other) greens, Corn, Kale, Lima beans, Mixed vegetables, Okra, Parsnips, Peas, green, Potatoes (1/2 medium or 1/2 cup mashed), Pumpkin, Rutabaga, Spaghetti or tomato sauce, Spinach, Squash (zucchini or yellow), Stewed tomatoes, Succotash, Sweet potatoes, Turnips, Yam \par Raw vegetables: Carrots,1/2 cup; Celery, 1/2 cup; Lettuce (clif, loose-leaf, green-leaf), 1 cup; Peppers, 1/2 cup; Spinach, 1 cup; Tomato, 1/2\par Fruits and fruit juices are important sources of potassium and magnesium. Fruits also contain fiber and are low in sodium and fat. One serving equals:\par Any fruit juice, # cup (6 ounces); Canned or frozen fruit, ½ cup (includes applesauce); Dried fruit, ¼ cup; \par Fresh fruit:\par Apple, 1 medium; Apricots, 2 medium; Banana, 1 medium; Berries, 1/2 cup; Melon, 1 wedge, or 1/2 cup; Cherries, 10; Grapefruit, 1/2; Grapes, 15; Kiwi, 1 medium; Tolstoy, 1/2 small; Nectarine, 1 medium; Orange, 1 medium; Peach, 1 medium; Pear, 1 medium; Pineapple, 1/2 cup; Plums, 2 medium; Tangerine, 1 large\par Dairy foods provide protein and calcium. Use low-fat or nonfat dairy products to cut down on fat. One serving equals:\par Skim milk, 1 cup (8 ounces); 1% low fat milk, 1 cup (8 ounces); 2% low fat milk, 1 cup (8 ounces) nonfat dry milk powder (1/3 cup); Low-fat cottage cheese, 1 cup (8 ounces); Parmesan cheese, 1 tablespoon; Mozzarella cheese, part skim, 1/4 cup (1 ounce); Low-fat cheddar cheese, 11/2 ounces; Ricotta cheese, part skim milk or nonfat, 1/4 cup (11/2 ounces); Other low fat or nonfat cheeses (11/2 oz.); Low-fat or nonfat yogurt, fruit-flavored or plain, 1 cup (8 ounces)\par Low-fat or nonfat frozen yogurt, 1/2 cup (4 ounces); Note: People who can't digest dairy products can try taking lactase enzyme pills or drops (available at drug and grocery stores) when they eat dairy. There is also milk available with the enzyme already added. Or you can buy lactose-free milk.\par Meat, poultry, and fish are good sources of protein and magnesium. One serving equals:\par Lean meat including beef, veal, or pork, 3 ounces cooked; Skinless, white meat poultry including turkey, chicken, 3 ounces; Fish and shellfish, 3 ounces cooked; Low-fat luncheon meats, 1 ounce; Egg, 1 medium; Tofu, 3 ounces\par Note: Three ounces of cooked meat is about the size of a deck of cards.\par Nuts, seeds, and legumes are rich sources of energy, magnesium, potassium, protein and fiber. Nuts and seeds are also high in fat, so portions should be small.\par Almonds, 1/3 cup; Beans such as kidney, olivares, and navy, 1/2 cup cooked; Chickpeas and lentils, 1/2 cup cooked; Cashews, 1/3 cup; Filberts, 1/3 cup; Mixed nuts, 1/3 cup; Peanut butter, 2 tablespoons; Peanuts, 1/3 cup; Sesame seeds, 2 tablespoons; Sunflower seeds, 2 tablespoons; Tofu, regular, 3 ounces; Walnuts, 1/3 cup \par Following the above diet will give you about 27% fat in your diet. The goal is to have 30% or less of the calories you eat each day be from fat. To meet that goal, do not eat more than 2-3 servings daily of added fat. Also try to limit sweets. One serving equals:\par Butter or margarine, 1 teaspoon; Mayonnaise, 1 teaspoon; Low-fat mayonnaise, 1 tablespoon; Salad dressing, 1 tablespoon; Low-fat salad dressing, 2 tablespoons; Oil, 1 teaspoon (use olive, canola, safflower, or other vegetable oils); Candy, hard, 3 pieces; Jelly or jam, 1 tablespoon); Jell-O, 1/2 cup; Jelly beans, 1/2 ounce; Maple syrup, 1 tablespoon; Popsicle, 1; Sherbet or nonfat or low-fat frozen yogurt, 1/2 cup; Sugar, 1 tablespoon; Sugared lemonade or fruit punch, 1 cup (8 ounces); Note: Try diet fruit-flavored gelatin or frozen, canned, or fresh fruit for dessert.\par \par Small amounts of alcohol may have benefits to the heart and blood pressure. However, excess use of alcohol can cause damage to the brain, liver and other organs. It can lead to high blood pressure. Drinking more than two drinks (15 ml) every day can raise your blood pressure. 15 ml of alcohol equals: \par • one 12-ounce bottle of beer \par • a half glass (5 ounces) of wine \par • 1 ounce (one shot) of 100 proof hard liquor\par 7. rbbb pt needs to fu with cardiology and never had stress test or echo \par 8 hyperlipidemia- low fat diet.  \par 9 Fatty Liver: The patient denies any jaundice or pruritus. The patient denies any alcohol use. The patient denies taking large doses of nonsteroidal anti-inflammatory drugs or acetaminophen. The findings are suggestive of fatty liver. The patient and I had a long discussion regarding the risks of fatty liver progressing to cirrhosis. The patient was told of the possible increased risk of developing liver failure, cirrhosis, ascites, GI bleeding secondary to varices, hepatic encephalopathy, bleeding tendencies and liver cancer. The patient was told of the importance of follow-up. The patient was advised to follow up every 6 months for blood work and imaging studies. The patient agreed and will follow up. The patient was advised to lose weight. I recommend a trial of vitamin E supplementation for the fatty liver. If the liver enzymes remain elevated, the patient may require a trial of Pioglitazone for the fatty liver. I recommend avoid alcohol and hepato-toxic agents. The patient was also advised to avoid NSAIDs, Acetaminophen and any other hepatotoxic drugs. The patient was also advised not to share needles, razors, scissors, nail clippers, etc.. The patient is to continue close follow-up in our office for blood work and exams. If the liver enzymes remain elevated, the patient may require a CT guided liver biopsy to assess the liver parenchyma and for possible treatment. We had a long discussion regarding the risks and benefits of the procedure. The patient was told of the risks of bleeding, perforation, infections, emergency surgery and missing lesions. The patient agreed and will follow-up to reassess the symptoms Patient has been counseled on life style interventions, including but not limited to: weight loss (3-5% loss of body weight might improve fat in the liver, while 7-10% needed for potential improvement of other components, including inflammation and scarring of the liver, called fibrosis); healthy diet, avoiding added sugars, sodas, avoiding saturated fats, limiting sodium, avoiding alcohol; and on the importance of regular exercise (> 150 min/week moderate intensity aerobic exercise with at least 2x/week muscle strengthening or exercise as tolerated). \par - I explained to patient the natural Hx of NAFLD. \par cognitive disorder  mild  exercise brain,  eat healthy   do cross word puzzles. and suduko.  read stay active \par \par

## 2021-03-17 NOTE — HISTORY OF PRESENT ILLNESS
[FreeTextEntry1] : cpe  [de-identified] : Pt is a 70 yrold man with DM type 2, pituitary adenoma, hypertension, psoriasis and arthropathy who is here for his cpe.

## 2021-03-18 DIAGNOSIS — R06.00 DYSPNEA, UNSPECIFIED: ICD-10-CM

## 2021-03-18 LAB
25(OH)D3 SERPL-MCNC: 52.4 NG/ML
AFP-TM SERPL-MCNC: 3.2 NG/ML
ALBUMIN SERPL ELPH-MCNC: 4.9 G/DL
ALP BLD-CCNC: 98 U/L
ALT SERPL-CCNC: 12 U/L
ANION GAP SERPL CALC-SCNC: 13 MMOL/L
APPEARANCE: CLEAR
AST SERPL-CCNC: 16 U/L
BACTERIA: NEGATIVE
BILIRUB SERPL-MCNC: 0.4 MG/DL
BILIRUBIN URINE: NEGATIVE
BLOOD URINE: NEGATIVE
BUN SERPL-MCNC: 18 MG/DL
CALCIUM SERPL-MCNC: 9.5 MG/DL
CHLORIDE SERPL-SCNC: 106 MMOL/L
CHOLEST SERPL-MCNC: 222 MG/DL
CO2 SERPL-SCNC: 26 MMOL/L
COLOR: YELLOW
CREAT SERPL-MCNC: 1.14 MG/DL
ESTIMATED AVERAGE GLUCOSE: 131 MG/DL
FOLATE SERPL-MCNC: 7.9 NG/ML
FRUCTOSAMINE SERPL-MCNC: 244 UMOL/L
GLUCOSE QUALITATIVE U: NEGATIVE
GLUCOSE SERPL-MCNC: 143 MG/DL
HBA1C MFR BLD HPLC: 6.2 %
HDLC SERPL-MCNC: 59 MG/DL
HYALINE CASTS: 1 /LPF
KETONES URINE: NEGATIVE
LDLC SERPL CALC-MCNC: 128 MG/DL
LEUKOCYTE ESTERASE URINE: NEGATIVE
MICROSCOPIC-UA: NORMAL
NITRITE URINE: NEGATIVE
NONHDLC SERPL-MCNC: 163 MG/DL
PH URINE: 6
POTASSIUM SERPL-SCNC: 4.4 MMOL/L
PROLACTIN SERPL-MCNC: 6 NG/ML
PROT SERPL-MCNC: 6.9 G/DL
PROTEIN URINE: NORMAL
PSA SERPL-MCNC: 3.03 NG/ML
RED BLOOD CELLS URINE: 3 /HPF
SODIUM SERPL-SCNC: 145 MMOL/L
SPECIFIC GRAVITY URINE: 1.03
SQUAMOUS EPITHELIAL CELLS: 0 /HPF
TRIGL SERPL-MCNC: 179 MG/DL
UROBILINOGEN URINE: NORMAL
VIT B12 SERPL-MCNC: 330 PG/ML
WHITE BLOOD CELLS URINE: 1 /HPF

## 2021-03-18 RX ORDER — AMOXICILLIN 875 MG/1
875 TABLET, FILM COATED ORAL
Qty: 20 | Refills: 0 | Status: COMPLETED | COMMUNITY
Start: 2020-10-01

## 2021-04-01 ENCOUNTER — OUTPATIENT (OUTPATIENT)
Dept: OUTPATIENT SERVICES | Facility: HOSPITAL | Age: 71
LOS: 1 days | End: 2021-04-01
Payer: MEDICARE

## 2021-04-01 DIAGNOSIS — R06.00 DYSPNEA, UNSPECIFIED: ICD-10-CM

## 2021-04-01 PROCEDURE — 93017 CV STRESS TEST TRACING ONLY: CPT

## 2021-04-01 PROCEDURE — 93306 TTE W/DOPPLER COMPLETE: CPT | Mod: 26

## 2021-04-01 PROCEDURE — 93016 CV STRESS TEST SUPVJ ONLY: CPT

## 2021-04-01 PROCEDURE — 93306 TTE W/DOPPLER COMPLETE: CPT

## 2021-04-01 PROCEDURE — 93018 CV STRESS TEST I&R ONLY: CPT

## 2021-04-05 ENCOUNTER — APPOINTMENT (OUTPATIENT)
Dept: UROLOGY | Facility: CLINIC | Age: 71
End: 2021-04-05
Payer: MEDICARE

## 2021-04-05 VITALS
SYSTOLIC BLOOD PRESSURE: 138 MMHG | RESPIRATION RATE: 14 BRPM | DIASTOLIC BLOOD PRESSURE: 70 MMHG | HEART RATE: 74 BPM | TEMPERATURE: 97.1 F

## 2021-04-05 PROCEDURE — 99213 OFFICE O/P EST LOW 20 MIN: CPT

## 2021-04-07 NOTE — HISTORY OF PRESENT ILLNESS
[FreeTextEntry1] : 68 yo M recently evaluated by PCP, found to have an enlarged prostate on physical exam. Here for routine check-up. Pt denies any significant LUTS, gross hematuria, dysuria. AUAss is 0. Erections are not as rigid as before but adequate for penetration. \par \par 3/2/20 Interval history: Pt hasn't been seen since Jan 2018\par Still no significant urinary issues\par Has been having ED issues - lately not adequate for penetration\par Has tried cialis in the past\par \par 10/29/20 Interval history: Referred for asymptomatic microhematuria\par No gross hematuria, no dysuria\par No flank pain\par \par 4/5/21 Interval history: Scrotal rash for about 3 days\par Redness, no itching\par Tender when he crosses his legs\par not sexually active lately\par no prior history of scrotal rash but does have history of psoriatic arthritis\par no urinary issues

## 2021-04-07 NOTE — PHYSICAL EXAM
[General Appearance - Well Developed] : well developed [General Appearance - Well Nourished] : well nourished [Normal Appearance] : normal appearance [Well Groomed] : well groomed [General Appearance - In No Acute Distress] : no acute distress [Abdomen Soft] : soft [Abdomen Tenderness] : non-tender [Costovertebral Angle Tenderness] : no ~M costovertebral angle tenderness [Urethral Meatus] : meatus normal [Penis Abnormality] : normal circumcised penis [Urinary Bladder Findings] : the bladder was normal on palpation [Epididymis] : the epididymides were normal [Testes Tenderness] : no tenderness of the testes [Testes Mass (___cm)] : there were no testicular masses [Anus Abnormality] : the anus and perineum were normal [Rectal Exam - Rectum] : digital rectal exam was normal [Prostate Tenderness] : the prostate was not tender [No Prostate Nodules] : no prostate nodules [Prostate Size ___ gm] : prostate size [unfilled] gm [Edema] : no peripheral edema [] : no respiratory distress [Respiration, Rhythm And Depth] : normal respiratory rhythm and effort [Exaggerated Use Of Accessory Muscles For Inspiration] : no accessory muscle use [Oriented To Time, Place, And Person] : oriented to person, place, and time [Affect] : the affect was normal [Mood] : the mood was normal [Not Anxious] : not anxious [Normal Station and Gait] : the gait and station were normal for the patient's age [No Focal Deficits] : no focal deficits [No Palpable Adenopathy] : no palpable adenopathy [FreeTextEntry1] : red erythematous rash over right scrotum, deferred rectal exam

## 2021-04-07 NOTE — ASSESSMENT
[FreeTextEntry1] : 71 yo M with scrotal rash\par \par - Discussed potential etiologies for rash\par - Steroid-antifungal cream Rx transmitted\par - FU in 2 weeks

## 2021-04-19 ENCOUNTER — APPOINTMENT (OUTPATIENT)
Age: 71
End: 2021-04-19
Payer: MEDICARE

## 2021-04-19 DIAGNOSIS — R21 RASH AND OTHER NONSPECIFIC SKIN ERUPTION: ICD-10-CM

## 2021-04-19 PROCEDURE — 99212 OFFICE O/P EST SF 10 MIN: CPT

## 2021-04-25 PROBLEM — R21 SCROTAL RASH: Status: ACTIVE | Noted: 2021-04-05

## 2021-04-25 NOTE — ASSESSMENT
[FreeTextEntry1] : 71 yo M with recent scrotal rash, much improved\par \par - Continue cream until resolved\par - FU as needed

## 2021-04-25 NOTE — PHYSICAL EXAM
[General Appearance - Well Developed] : well developed [General Appearance - Well Nourished] : well nourished [Normal Appearance] : normal appearance [Well Groomed] : well groomed [General Appearance - In No Acute Distress] : no acute distress [Abdomen Soft] : soft [Costovertebral Angle Tenderness] : no ~M costovertebral angle tenderness [Abdomen Tenderness] : non-tender [Urethral Meatus] : meatus normal [Penis Abnormality] : normal circumcised penis [Urinary Bladder Findings] : the bladder was normal on palpation [Epididymis] : the epididymides were normal [Testes Tenderness] : no tenderness of the testes [Testes Mass (___cm)] : there were no testicular masses [Anus Abnormality] : the anus and perineum were normal [Rectal Exam - Rectum] : digital rectal exam was normal [Prostate Tenderness] : the prostate was not tender [No Prostate Nodules] : no prostate nodules [Prostate Size ___ gm] : prostate size [unfilled] gm [FreeTextEntry1] : mild pink rash over right scrotum, deferred rectal exam [Edema] : no peripheral edema [] : no respiratory distress [Respiration, Rhythm And Depth] : normal respiratory rhythm and effort [Exaggerated Use Of Accessory Muscles For Inspiration] : no accessory muscle use [Oriented To Time, Place, And Person] : oriented to person, place, and time [Affect] : the affect was normal [Mood] : the mood was normal [Not Anxious] : not anxious [Normal Station and Gait] : the gait and station were normal for the patient's age [No Focal Deficits] : no focal deficits [No Palpable Adenopathy] : no palpable adenopathy

## 2021-07-12 ENCOUNTER — APPOINTMENT (OUTPATIENT)
Dept: UROLOGY | Facility: CLINIC | Age: 71
End: 2021-07-12

## 2021-08-11 ENCOUNTER — APPOINTMENT (OUTPATIENT)
Dept: INTERNAL MEDICINE | Facility: CLINIC | Age: 71
End: 2021-08-11
Payer: MEDICARE

## 2021-08-11 VITALS
SYSTOLIC BLOOD PRESSURE: 136 MMHG | DIASTOLIC BLOOD PRESSURE: 81 MMHG | HEART RATE: 64 BPM | HEIGHT: 71 IN | TEMPERATURE: 98 F | BODY MASS INDEX: 27.16 KG/M2 | WEIGHT: 194 LBS | OXYGEN SATURATION: 97 %

## 2021-08-11 DIAGNOSIS — R41.89 OTHER SYMPTOMS AND SIGNS INVOLVING COGNITIVE FUNCTIONS AND AWARENESS: ICD-10-CM

## 2021-08-11 PROCEDURE — 99214 OFFICE O/P EST MOD 30 MIN: CPT

## 2021-08-11 RX ORDER — FLUOROURACIL 50 MG/G
5 CREAM TOPICAL
Qty: 1 | Refills: 0 | Status: COMPLETED | COMMUNITY
Start: 2018-09-25 | End: 2021-08-11

## 2021-08-11 NOTE — HEALTH RISK ASSESSMENT
[] : No [No] : In the past 12 months have you used drugs other than those required for medical reasons? No [No falls in past year] : Patient reported no falls in the past year [0] : 2) Feeling down, depressed, or hopeless: Not at all (0) [PHQ-2 Negative - No further assessment needed] : PHQ-2 Negative - No further assessment needed [de-identified] : walks bikes  [de-identified] : diabetic  [[id number]] : 0

## 2021-08-11 NOTE — COUNSELING
[Fall prevention counseling provided] : Fall prevention counseling provided [Adequate lighting] : Adequate lighting [No throw rugs] : No throw rugs [Use recommended devices] : Use recommended devices [Sleep ___ hours/day] : Sleep [unfilled] hours/day [Engage in a relaxing activity] : Engage in a relaxing activity [Plan in advance] : Plan in advance

## 2021-08-11 NOTE — ASSESSMENT
[FreeTextEntry1] : 1 diabetes- ---The following has been discussed:---\par -Targets for weight and HgA1c have been discussed with patient \par -FS goals have been reviewed with the patient in detail:\par AM <130 post meal<160-180\par -Diet and weight goals have been discussed with the patient in detail.\par -The importance of exercise in the treatment of diabetes has been discussed \par with the patient in detail.\par -Extensive dietary advice provided to patient and the need to avoid concentrated \par sweets/simple carbohydrates and to ensure to consume protein with each meal. \par -Patient instructed to limit carbohydrates to 60 gms per meal and 15 gms per \par snacks. \par -Patient to keep a blood sugar log to check fasting and before meals\par -Patient instructed on importance of daily feet inspection and to reports any \par open lesions to physician promptly well controlled \par \par \par 2 hpn well controlled continue present  management \par 3.   hyperlipidemia   check levels low fat diet \par 4.  cognitive impariment stable.  \par 5.  bmi 26  Weight loss, exercise, and diet control were discussed and are highly encouraged. Treatment options were given such as, aqua therapy, and contacting a nutritionist. Recommended to use the elliptical, stationary bike, less use of treadmill. Mindful eating was explained to the patient Obesity is associated with worsening asthma, shortness of breath, and potential for cardiac disease, diabetes, and other underlying medical conditions.\par \par 6 psoriasis   will try med potency steroid \par 7 fatty liver fu us of abd.  for fibrosis .\par 8 pit adenoma   fu since 2015 stable will repeat mri  next year.

## 2021-08-11 NOTE — PHYSICAL EXAM
[Normal Sclera/Conjunctiva] : normal sclera/conjunctiva [PERRL] : pupils equal round and reactive to light [Normal Oropharynx] : the oropharynx was normal [No JVD] : no jugular venous distention [Supple] : supple [Normal Rate] : normal rate  [Regular Rhythm] : with a regular rhythm [No Edema] : there was no peripheral edema [Normal Posterior Cervical Nodes] : no posterior cervical lymphadenopathy [Normal Anterior Cervical Nodes] : no anterior cervical lymphadenopathy [No CVA Tenderness] : no CVA  tenderness [Normal] : no rash [Coordination Grossly Intact] : coordination grossly intact [No Focal Deficits] : no focal deficits [Normal Gait] : normal gait [Speech Grossly Normal] : speech grossly normal [Normal Affect] : the affect was normal [Normal Insight/Judgement] : insight and judgment were intact [de-identified] : deformities of joints of feet and hands

## 2021-08-11 NOTE — HISTORY OF PRESENT ILLNESS
[FreeTextEntry1] : fu  [de-identified] :   he had echo and had diastolic dysfunction and stress test 6 mets and no ischemia.  He is feeling well.  he is not having chest pain.  His psoriasis is worsening.   He has not fu with rheumatologist.   He has cosentyx every 4 weeks.  he doesn’t have pain in his joints.   He has lost 3lbs and his blood sugars are 113 .,

## 2021-08-12 LAB
25(OH)D3 SERPL-MCNC: 56.8 NG/ML
ALBUMIN SERPL ELPH-MCNC: 4.8 G/DL
ALP BLD-CCNC: 96 U/L
ALT SERPL-CCNC: 13 U/L
ANION GAP SERPL CALC-SCNC: 11 MMOL/L
AST SERPL-CCNC: 15 U/L
BASOPHILS # BLD AUTO: 0.04 K/UL
BASOPHILS NFR BLD AUTO: 0.6 %
BILIRUB SERPL-MCNC: 0.5 MG/DL
BUN SERPL-MCNC: 16 MG/DL
CALCIUM SERPL-MCNC: 9.8 MG/DL
CHLORIDE SERPL-SCNC: 105 MMOL/L
CHOLEST SERPL-MCNC: 220 MG/DL
CO2 SERPL-SCNC: 27 MMOL/L
COVID-19 SPIKE DOMAIN ANTIBODY INTERPRETATION: POSITIVE
CREAT SERPL-MCNC: 1.25 MG/DL
EOSINOPHIL # BLD AUTO: 0.07 K/UL
EOSINOPHIL NFR BLD AUTO: 1 %
ESTIMATED AVERAGE GLUCOSE: 140 MG/DL
FOLATE SERPL-MCNC: 9.8 NG/ML
FRUCTOSAMINE SERPL-MCNC: 249 UMOL/L
GLUCOSE SERPL-MCNC: 117 MG/DL
HBA1C MFR BLD HPLC: 6.5 %
HCT VFR BLD CALC: 49.3 %
HDLC SERPL-MCNC: 55 MG/DL
HGB BLD-MCNC: 15.9 G/DL
IMM GRANULOCYTES NFR BLD AUTO: 0.3 %
LDLC SERPL CALC-MCNC: 134 MG/DL
LYMPHOCYTES # BLD AUTO: 1.74 K/UL
LYMPHOCYTES NFR BLD AUTO: 24.3 %
MAN DIFF?: NORMAL
MCHC RBC-ENTMCNC: 31.2 PG
MCHC RBC-ENTMCNC: 32.3 GM/DL
MCV RBC AUTO: 96.7 FL
MONOCYTES # BLD AUTO: 0.6 K/UL
MONOCYTES NFR BLD AUTO: 8.4 %
NEUTROPHILS # BLD AUTO: 4.68 K/UL
NEUTROPHILS NFR BLD AUTO: 65.4 %
NONHDLC SERPL-MCNC: 165 MG/DL
PLATELET # BLD AUTO: 207 K/UL
POTASSIUM SERPL-SCNC: 5 MMOL/L
PROLACTIN SERPL-MCNC: 4.6 NG/ML
PROT SERPL-MCNC: 6.8 G/DL
RBC # BLD: 5.1 M/UL
RBC # FLD: 14.6 %
SARS-COV-2 AB SERPL IA-ACNC: >250 U/ML
SODIUM SERPL-SCNC: 144 MMOL/L
TRIGL SERPL-MCNC: 157 MG/DL
VIT B12 SERPL-MCNC: 335 PG/ML
WBC # FLD AUTO: 7.15 K/UL

## 2021-08-26 ENCOUNTER — LABORATORY RESULT (OUTPATIENT)
Age: 71
End: 2021-08-26

## 2021-09-08 ENCOUNTER — RX RENEWAL (OUTPATIENT)
Age: 71
End: 2021-09-08

## 2021-10-10 ENCOUNTER — RX RENEWAL (OUTPATIENT)
Age: 71
End: 2021-10-10

## 2021-10-13 ENCOUNTER — APPOINTMENT (OUTPATIENT)
Dept: INTERNAL MEDICINE | Facility: CLINIC | Age: 71
End: 2021-10-13
Payer: MEDICARE

## 2021-10-13 VITALS
HEIGHT: 71 IN | TEMPERATURE: 97.3 F | HEART RATE: 63 BPM | OXYGEN SATURATION: 98 % | SYSTOLIC BLOOD PRESSURE: 137 MMHG | DIASTOLIC BLOOD PRESSURE: 79 MMHG | WEIGHT: 194 LBS | BODY MASS INDEX: 27.16 KG/M2

## 2021-10-13 DIAGNOSIS — R55 SYNCOPE AND COLLAPSE: ICD-10-CM

## 2021-10-13 PROCEDURE — 99215 OFFICE O/P EST HI 40 MIN: CPT | Mod: 25

## 2021-10-13 PROCEDURE — 93000 ELECTROCARDIOGRAM COMPLETE: CPT

## 2021-10-13 RX ORDER — ZOSTER VACCINE RECOMBINANT, ADJUVANTED 50 MCG/0.5
50 KIT INTRAMUSCULAR
Qty: 1 | Refills: 2 | Status: COMPLETED | COMMUNITY
Start: 2018-12-20 | End: 2021-10-13

## 2021-10-13 RX ORDER — ZOSTER VACCINE RECOMBINANT, ADJUVANTED 50 MCG/0.5
50 KIT INTRAMUSCULAR
Qty: 1 | Refills: 1 | Status: COMPLETED | COMMUNITY
Start: 2020-02-27 | End: 2021-10-13

## 2021-10-13 RX ORDER — ZOSTER VACCINE RECOMBINANT, ADJUVANTED 50 MCG/0.5
50 KIT INTRAMUSCULAR
Qty: 1 | Refills: 2 | Status: COMPLETED | COMMUNITY
Start: 2020-09-21 | End: 2021-10-13

## 2021-10-13 NOTE — COUNSELING
[Healthy eating counseling provided] : healthy eating [Fall prevention counseling provided] : fall prevention  [Sleep ___ hours/day] : Sleep [unfilled] hours/day [Engage in a relaxing activity] : Engage in a relaxing activity [Plan in advance] : Plan in advance [Adequate lighting] : Adequate lighting [No throw rugs] : No throw rugs [Use proper foot wear] : Use proper foot wear [Use recommended devices] : Use recommended devices

## 2021-10-13 NOTE — RESULTS/DATA
[ECG Reviewed] : reviewed [NSR] : normal sinus rhythm [Ventricular Rate___] : ventricular rate is [unfilled] beats per minute [Sinus Bradycardia] : sinus bradycardia [P Waves Normal] : the P wave is normal [ECG Intervals KS.] : KS interval is normal [WA Interval___] : [unfilled] seconds [QRS Interval___] : QRS interval: [unfilled] seconds [RBBB] : right bundle branch block [QTc Interval___] : QTc interval: [unfilled] [No Interval Change] : no interval change

## 2021-10-16 LAB
APPEARANCE: CLEAR
APTT BLD: 31.6 SEC
BASOPHILS # BLD AUTO: 0.04 K/UL
BASOPHILS NFR BLD AUTO: 0.5 %
BILIRUBIN URINE: NEGATIVE
BLOOD URINE: NEGATIVE
COLOR: YELLOW
EOSINOPHIL # BLD AUTO: 0.06 K/UL
EOSINOPHIL NFR BLD AUTO: 0.8 %
ESTIMATED AVERAGE GLUCOSE: 131 MG/DL
FRUCTOSAMINE SERPL-MCNC: 254 UMOL/L
GLUCOSE QUALITATIVE U: NEGATIVE
HBA1C MFR BLD HPLC: 6.2 %
HCT VFR BLD CALC: 49.7 %
HGB BLD-MCNC: 15.6 G/DL
IMM GRANULOCYTES NFR BLD AUTO: 0.3 %
INR PPP: 0.95 RATIO
KETONES URINE: NEGATIVE
LEUKOCYTE ESTERASE URINE: NEGATIVE
LYMPHOCYTES # BLD AUTO: 1.55 K/UL
LYMPHOCYTES NFR BLD AUTO: 20.9 %
MAN DIFF?: NORMAL
MCHC RBC-ENTMCNC: 31 PG
MCHC RBC-ENTMCNC: 31.4 GM/DL
MCV RBC AUTO: 98.8 FL
MONOCYTES # BLD AUTO: 0.59 K/UL
MONOCYTES NFR BLD AUTO: 8 %
NEUTROPHILS # BLD AUTO: 5.14 K/UL
NEUTROPHILS NFR BLD AUTO: 69.5 %
NITRITE URINE: NEGATIVE
PH URINE: 5.5
PLATELET # BLD AUTO: 190 K/UL
PROTEIN URINE: NORMAL
PT BLD: 11.3 SEC
RBC # BLD: 5.03 M/UL
RBC # FLD: 14.9 %
SPECIFIC GRAVITY URINE: 1.03
UROBILINOGEN URINE: NORMAL
WBC # FLD AUTO: 7.4 K/UL

## 2021-10-18 NOTE — HISTORY OF PRESENT ILLNESS
[No Pertinent Cardiac History] : no history of aortic stenosis, atrial fibrillation, coronary artery disease, recent myocardial infarction, or implantable device/pacemaker [No Pertinent Pulmonary History] : no history of asthma, COPD, sleep apnea, or smoking [Chronic Kidney Disease] : chronic kidney disease [Diabetes] : diabetes [(Patient denies any chest pain, claudication, dyspnea on exertion, orthopnea, palpitations or syncope)] : Patient denies any chest pain, claudication, dyspnea on exertion, orthopnea, palpitations or syncope [Family Member] : no family member with adverse anesthesia reaction/sudden death [Self] : no previous adverse anesthesia reaction [Chronic Anticoagulation] : no chronic anticoagulation [FreeTextEntry1] : epiretinal membrane right eye   vitrectomy right eye  [FreeTextEntry2] : 10/20/21 [FreeTextEntry3] : Dr Warner [FreeTextEntry4] : Pt is a 70 y r old man with hx of basal cell ca, diabetes Type 2, hyperlipidemia  , RBBB,  pituitary adenoma , psoriatic arthropathy , RA and renal insuff who is  here for medical clearance .   [FreeTextEntry7] : echo may 2021 grade 1 diastolic dysfunction lvef 65-70%  stress test  6mets  neg for ischemia

## 2021-10-18 NOTE — ASSESSMENT
[High Risk Surgery - Intraperitoneal, Intrathoracic or Supringuinal Vascular Procedures] : High Risk Surgery - Intraperitoneal, Intrathoracic or Supringuinal Vascular Procedures - No (0) [Ischemic Heart Disease] : Ischemic Heart Disease - No (0) [Congestive Heart Failure] : Congestive Heart Failure - No (0) [Prior Cerebrovascular Accident or TIA] : Prior Cerebrovascular Accident or TIA - No (0) [Creatinine >= 2mg/dL (1 Point)] : Creatinine >= 2mg/dL - No (0) [Insulin-dependent Diabetic (1 Point)] : Insulin-dependent Diabetic - No (0) [0] : 0 , RCRI Class: I, Risk of Post-Op Cardiac Complications: 3.9%, 95% CI for Risk Estimate: 2.8% - 5.4% [Modify medications prior to procedure] : Modify medications prior to procedure [As per surgery] : as per surgery [FreeTextEntry4] : preop  Pt has been explained the surgery , anesthesia , complications risks alternatives  and all questions have been answered by his surgeon.  he is having a low risk procedure and is a low risk for having post operative cardiac event  . he will have blood testing today  and was told he didn’t require covid testing by his surgeon .   [FreeTextEntry7] : on  day of surgery don’t take  glimepiride  but take candesartan  with sip of water 3 hrs prior to surgery or earlier.

## 2021-10-18 NOTE — PHYSICAL EXAM
[Well Developed] : well developed [Well Nourished] : well nourished [Normal Voice Quality] : was normal [Normal Verbal Skills] : the patient had normal verbal communication skills [Normal Nonverbal Skills] : normal nonverbal communication skills were demonstrated [Normal Sclera/Conjunctiva] : normal sclera/conjunctiva [PERRL] : pupils equal round and reactive to light [Normal Oropharynx] : the oropharynx was normal [No JVD] : no jugular venous distention [Supple] : supple [Rate ___] : at [unfilled] breaths per minute [Normal Rhythm/Effort] : normal respiratory rhythm and effort [Clear Bilaterally] : the lungs were clear to auscultation bilaterally [Normal to Percussion] : the lungs were normal to percussion [5th Left ICS - MCL] : palpated at the 5th LICS in the midclavicular line [Heart Rate ___] : [unfilled] bpm [Rhythm Regular] : regular [Normal Rate] : normal [Normal S1] : normal S1 [Normal S2] : normal S2 [No Murmur] : no murmurs heard [No Pitting Edema] : no pitting edema present [Rt] : varicose veins of the right leg noted [Lt] : varicose veins of the left leg noted [2+] : left 2+ [No Abnormalities] : the abdominal aorta was not enlarged and no bruit was heard [Soft, Nontender] : the abdomen was soft and nontender [No Mass] : no masses were palpated [No HSM] : no hepatosplenomegaly noted [None] : no CVA tenderness [No CVA Tenderness] : no CVA  tenderness [No Spinal Tenderness] : no spinal tenderness [Normal Station and Gait] : the gait and station were normal [Normal Motor Tone] : the muscle tone was normal [Involuntary Movements] : no involuntary movements were seen [Normal Scalp] : inspection of the scalp showed no abnormalities [Examination Of The Hair] : texture and distribution of hair was normal [Complexion Fair] : fair complexion [Normal] : affect was normal and insight and judgment were intact [Comprehensive Foot Exam Normal] : Right and left foot were examined and both feet are normal. No ulcers in either foot. Toes are normal and with full ROM.  Normal tactile sensation with monofilament testing throughout both feet [S3] : no S3 [S4] : no S4 [Right Carotid Bruit] : no bruit heard over the right carotid [Left Carotid Bruit] : no bruit heard over the left carotid [Right Femoral Bruit] : no bruit heard over the right femoral artery [Left Femoral Bruit] : no bruit heard over the left femoral artery [Bruit] : no bruit heard [Tattoo - Single] : no tattoos observed [de-identified] : arthritic deformities of feet  due to psoriasis and hands.

## 2021-11-04 ENCOUNTER — APPOINTMENT (OUTPATIENT)
Dept: INTERNAL MEDICINE | Facility: CLINIC | Age: 71
End: 2021-11-04

## 2021-11-04 ENCOUNTER — RX RENEWAL (OUTPATIENT)
Age: 71
End: 2021-11-04

## 2021-12-13 ENCOUNTER — APPOINTMENT (OUTPATIENT)
Dept: INTERNAL MEDICINE | Facility: CLINIC | Age: 71
End: 2021-12-13
Payer: MEDICARE

## 2021-12-13 VITALS
DIASTOLIC BLOOD PRESSURE: 72 MMHG | RESPIRATION RATE: 14 BRPM | BODY MASS INDEX: 26.6 KG/M2 | WEIGHT: 190 LBS | SYSTOLIC BLOOD PRESSURE: 136 MMHG | OXYGEN SATURATION: 97 % | TEMPERATURE: 97.3 F | HEIGHT: 71 IN | HEART RATE: 62 BPM

## 2021-12-13 DIAGNOSIS — L40.9 PSORIASIS, UNSPECIFIED: ICD-10-CM

## 2021-12-13 PROCEDURE — G0008: CPT

## 2021-12-13 PROCEDURE — 90662 IIV NO PRSV INCREASED AG IM: CPT

## 2021-12-13 PROCEDURE — 99213 OFFICE O/P EST LOW 20 MIN: CPT | Mod: 25

## 2021-12-13 NOTE — HISTORY OF PRESENT ILLNESS
[FreeTextEntry1] : fu  [de-identified] : pt is feeling well and -denies any headaches, nausea, vomiting, fever, chills, sweats, chest pain, chest pressure, diarrhea, constipation, dysphagia, sour taste in the mouth, dizziness, leg swelling, leg pain, myalgias, arthralgias, itchy eyes, itchy ears, heartburn, or reflux.\par  his blood sugars have been 170 today but ate cup cakes last night.

## 2021-12-13 NOTE — HEALTH RISK ASSESSMENT
[No] : In the past 12 months have you used drugs other than those required for medical reasons? No [No falls in past year] : Patient reported no falls in the past year [0] : 1) Little interest or pleasure doing things: Not at all (0) [PHQ-2 Negative - No further assessment needed] : PHQ-2 Negative - No further assessment needed [de-identified] : walks  [de-identified] : diabetic  [[id number]] : 0

## 2021-12-13 NOTE — PHYSICAL EXAM
[PERRL] : pupils equal round and reactive to light [Normal Oropharynx] : the oropharynx was normal [Supple] : supple [Normal Rate] : normal rate  [Regular Rhythm] : with a regular rhythm [Normal S1, S2] : normal S1 and S2 [No Edema] : there was no peripheral edema [No Extremity Clubbing/Cyanosis] : no extremity clubbing/cyanosis [No CVA Tenderness] : no CVA  tenderness [No Spinal Tenderness] : no spinal tenderness [Coordination Grossly Intact] : coordination grossly intact [Normal] : affect was normal and insight and judgment were intact [de-identified] : lower extremities excoriations and  psoriasis exacerbation.   [de-identified] : hallux vulgaris bilateral

## 2021-12-13 NOTE — ASSESSMENT
[FreeTextEntry1] : 1 dm ---The following has been discussed:---\par -Targets for weight and HgA1c have been discussed with patient \par -FS goals have been reviewed with the patient in detail:\par AM <130 post meal<160-180\par -Diet and weight goals have been discussed with the patient in detail.\par -The importance of exercise in the treatment of diabetes has been discussed \par with the patient in detail.\par -Extensive dietary advice provided to patient and the need to avoid concentrated \par sweets/simple carbohydrates and to ensure to consume protein with each meal. \par -Patient instructed to limit carbohydrates to 60 gms per meal and 15 gms per \par snacks. \par -Patient to keep a blood sugar log to check fasting and before meals\par -Patient instructed on importance of daily feet inspection and to reports any \par open lesions to physician promptly\par 2 hpn low salt diet  continue medications.   \par 3.  psoriasis   will add coal tar to  his steroidal ointment    keep clean avoid scratching. \par 4 hld  low fat diet avoid  cupcakes.  fried fatty foods.  \par 5 RA  .  exercise   Pts that consume certain foods can worsen their symptoms of arthritis.  The five worse foods are bagels, gluten, French fries and processed fast foods, trans fats  such as soybean, sunflower oil, omega 6 , vegetable oil and corn oil .  Blackened and barbecue foods  Advanced glycogen enzymes , night shade vegetables such as tomatoes, peppers, potatoes , and sugars.  Foods that help   tart cherries, broccoli, papaya, olive oil probiotics nuts such as almonds, pineapple  green tea.  Papain breaks down protein and bromelin reduces inflammation tumeric destroys free radicals and devils claw reduces arthritis pain.  as well as ginger extract , rutin , [place] yucca root, citrus bioflavonoids  and Boswelia extract.\par 6 pt adenoma stable repeat mri in July 2022

## 2022-03-03 NOTE — COUNSELING
[Fall prevention counseling provided] : Fall prevention counseling provided [Adequate lighting] : Adequate lighting [No Acute Distress] : no acute distress [No throw rugs] : No throw rugs [Normal Oropharynx] : normal oropharynx [Use proper foot wear] : Use proper foot wear [Normal Appearance] : normal appearance [Use recommended devices] : Use recommended devices [No Neck Mass] : no neck mass [Sleep ___ hours/day] : Sleep [unfilled] hours/day [Engage in a relaxing activity] : Engage in a relaxing activity [Normal Rate/Rhythm] : normal rate/rhythm [Normal S1, S2] : normal s1, s2 [Plan in advance] : Plan in advance [No Murmurs] : no murmurs [Potential consequences of obesity discussed] : Potential consequences of obesity discussed [Benefits of weight loss discussed] : Benefits of weight loss discussed [No Resp Distress] : no resp distress [Clear to Auscultation Bilaterally] : clear to auscultation bilaterally [Structured Weight Management Program suggested:] : Structured weight management program suggested [Encouraged to maintain food diary] : Encouraged to maintain food diary [No Abnormalities] : no abnormalities [Benign] : benign [Encouraged to increase physical activity] : Encouraged to increase physical activity [Encouraged to use exercise tracking device] : Encouraged to use exercise tracking device [Normal Gait] : normal gait [FreeTextEntry1] : diabetic diet  [No Clubbing] : no clubbing [No Cyanosis] : no cyanosis [No Edema] : no edema [Normal Color/ Pigmentation] : normal color/ pigmentation [FROM] : FROM [No Focal Deficits] : no focal deficits [Oriented x3] : oriented x3 [Normal Affect] : normal affect

## 2022-03-16 ENCOUNTER — APPOINTMENT (OUTPATIENT)
Dept: INTERNAL MEDICINE | Facility: CLINIC | Age: 72
End: 2022-03-16

## 2022-03-21 ENCOUNTER — APPOINTMENT (OUTPATIENT)
Dept: INTERNAL MEDICINE | Facility: CLINIC | Age: 72
End: 2022-03-21
Payer: MEDICARE

## 2022-03-21 VITALS
OXYGEN SATURATION: 96 % | BODY MASS INDEX: 26.6 KG/M2 | SYSTOLIC BLOOD PRESSURE: 137 MMHG | TEMPERATURE: 97.4 F | HEIGHT: 71 IN | HEART RATE: 65 BPM | DIASTOLIC BLOOD PRESSURE: 77 MMHG | WEIGHT: 190 LBS

## 2022-03-21 DIAGNOSIS — I83.90 ASYMPTOMATIC VARICOSE VEINS OF UNSPECIFIED LOWER EXTREMITY: ICD-10-CM

## 2022-03-21 DIAGNOSIS — Z12.11 ENCOUNTER FOR SCREENING FOR MALIGNANT NEOPLASM OF COLON: ICD-10-CM

## 2022-03-21 DIAGNOSIS — Z01.818 ENCOUNTER FOR OTHER PREPROCEDURAL EXAMINATION: ICD-10-CM

## 2022-03-21 DIAGNOSIS — N40.0 BENIGN PROSTATIC HYPERPLASIA WITHOUT LOWER URINARY TRACT SYMPMS: ICD-10-CM

## 2022-03-21 PROCEDURE — G0439: CPT

## 2022-03-21 PROCEDURE — 82270 OCCULT BLOOD FECES: CPT

## 2022-03-21 NOTE — HEALTH RISK ASSESSMENT
[Very Good] : ~his/her~  mood as very good [Yes] : Yes [2 - 3 times a week (3 pts)] : 2 - 3  times a week (3 points) [3 or 4 (1 pt)] : 3 or 4  (1 point) [Never (0 pts)] : Never (0 points) [No] : In the past 12 months have you used drugs other than those required for medical reasons? No [No falls in past year] : Patient reported no falls in the past year [0] : 2) Feeling down, depressed, or hopeless: Not at all (0) [PHQ-2 Negative - No further assessment needed] : PHQ-2 Negative - No further assessment needed [Patient reported bone density results were normal] : Patient reported bone density results were normal [Patient declined colonoscopy] : Patient declined colonoscopy [HIV test declined] : HIV test declined [Hepatitis C test offered] : Hepatitis C test offered [None] : None [With Significant Other] : lives with significant other [# of Members in Household ___] :  household currently consist of [unfilled] member(s) [Retired] : retired [High School] : high school [] :  [# Of Children ___] : has [unfilled] children [Sexually Active] : sexually active [Feels Safe at Home] : Feels safe at home [Fully functional (bathing, dressing, toileting, transferring, walking, feeding)] : Fully functional (bathing, dressing, toileting, transferring, walking, feeding) [Fully functional (using the telephone, shopping, preparing meals, housekeeping, doing laundry, using] : Fully functional and needs no help or supervision to perform IADLs (using the telephone, shopping, preparing meals, housekeeping, doing laundry, using transportation, managing medications and managing finances) [Smoke Detector] : smoke detector [Carbon Monoxide Detector] : carbon monoxide detector [Safety elements used in home] : safety elements used in home [Seat Belt] :  uses seat belt [Sunscreen] : uses sunscreen [Former] : Former [FreeTextEntry1] : none  [de-identified] : rheumatology  [de-identified] : stopped 44 yrs ago  [Audit-CScore] : 4 [de-identified] : none [de-identified] : regular  [[id number]] : 0 [Change in mental status noted] : No change in mental status noted [Language] : denies difficulty with language [Behavior] : denies difficulty with behavior [Learning/Retaining New Information] : denies difficulty learning/retaining new information [Handling Complex Tasks] : denies difficulty handling complex tasks [Reasoning] : denies difficulty with reasoning [Spatial Ability and Orientation] : denies difficulty with spatial ability and orientation [Reports changes in hearing] : Reports no changes in hearing [Reports changes in vision] : Reports no changes in vision [Reports changes in dental health] : Reports no changes in dental health [Guns at Home] : no guns at home [Travel to Developing Areas] : does not  travel to developing areas [TB Exposure] : is not being exposed to tuberculosis [Caregiver Concerns] : does not have caregiver concerns [BoneDensityDate] : 2019 [ColonoscopyDate] : 2013 [de-identified] : last eye exam 2 weeks ago  [de-identified] : last visit  a few months ago  [AdvancecareDate] : 03/22

## 2022-03-21 NOTE — ASSESSMENT
[FreeTextEntry1] : health He is up to date with his vaccines needs covid booster.  He doesn’t want colonoscopy will do fit  testing  . He is up to date with eye and dental exams  psa will be done  \par 2.  bmi 24  Weight loss, exercise, and diet control were discussed and are highly encouraged. Treatment options were given such as, aqua therapy, and contacting a nutritionist. Recommended to use the elliptical, stationary bike, less use of treadmill. Mindful eating was explained to the patient Obesity is associated with worsening asthma, shortness of breath, and potential for cardiac disease, diabetes, and other underlying medical conditions.\par 3 dm  ---The following has been discussed:---\par -Targets for weight and HgA1c have been discussed with patient \par -FS goals have been reviewed with the patient in detail:\par AM <130 post meal<160-180\par -Diet and weight goals have been discussed with the patient in detail.\par -The importance of exercise in the treatment of diabetes has been discussed \par with the patient in detail.\par -Extensive dietary advice provided to patient and the need to avoid concentrated \par sweets/simple carbohydrates and to ensure to consume protein with each meal. \par -Patient instructed to limit carbohydrates to 60 gms per meal and 15 gms per \par snacks. \par -Patient to keep a blood sugar log to check fasting and before meals\par -Patient instructed on importance of daily feet inspection and to reports any \par open lesions to physician promptly well controlled continue present management \par \par 4  htn   continue present medications low salt diet  exercise \par 5.  psoriatic arthropathy stable  continue immunotherapy.  6  pt adenoma  July repeat  mri \par 6  colon cancer screening  Colon and rectal cancer screening is a way in which doctors check the colon and rectum for signs of cancer or growths (called polyps) that might become cancer. It is done in people who have no symptoms and no reason to think they have cancer. The goal is to find and remove polyps before they become cancer, or to find cancer early, before it grows, spreads, or causes problems.\par \par The colon and rectum are the last part of the digestive tract (figure 1). When doctors talk about colon and rectal cancer screening, they use the term "colorectal." That is just a shorter way of saying "colon and rectal." It's also possible to say just colon cancer screening.\par \par Studies show that having colon cancer screening lowers the chance of dying from colon cancer. There are several different types of screening test that can do this.\par \par What are the different screening tests for colon cancer? — They include:\par \par ?Colonoscopy – Colonoscopy allows the doctor to see directly inside the entire colon. Before you can have a colonoscopy, you must clean out your colon. You do this at home by drinking a special liquid that causes watery diarrhea for several hours. On the day of the test, you get medicine to help you relax. Then a doctor puts a thin tube into your anus and advances it into your colon (figure 2). The tube has a tiny camera attached to it, so the doctor can see inside your colon. The tube also has tiny tools on the end, so the doctor can remove pieces of tissue or polyps if they are there. After polyps or pieces of tissue are removed, they are sent to a lab to be checked for cancer.\par \par \par •Advantages of this test – Colonoscopy finds most small polyps and almost all large polyps and cancers. If found, polyps can be removed right away. This test gives the most accurate results. If any other screening tests are done first and come back positive, a colonoscopy will need to be done for follow-up. If you have a colonoscopy as your first test, you will probably not need a second follow-up test soon after.\par \par \par •Drawbacks to this test – Colonoscopy has some small risks. It can cause bleeding or tear the inside of the colon, but this only happens in 1 out of 1,000 people. Also, cleaning out the bowel beforehand can be unpleasant. Plus, people usually cannot work or drive for the rest of the day after the test, because of the relaxation medicine they must take during the test.\par \par \par ?Sigmoidoscopy – A sigmoidoscopy is similar to a colonoscopy. The difference is that this test looks only at the last part of the colon, and a colonoscopy looks at the whole colon. Before you have a sigmoidoscopy, you must clean out the lower part of your colon using an enema. This bowel cleaning is not as thorough or unpleasant as the one for colonoscopy. For this test, you do not need to take medicines to help you relax, so you can drive and work afterward if you want.\par \par \par •Advantages of this test – Sigmoidoscopy can find polyps and cancers in the rectum and the last part of the colon. If polyps are found, they can be removed right away.\par \par \par •Drawbacks to this test – In about 2 out of 10,000 people, sigmoidoscopy tears the inside of the colon. The test also can't find polyps or cancers that are in the part of the colon the test does not view (figure 3). If doctors find polyps or cancer during a sigmoidoscopy, they usually follow up with a colonoscopy.\par \par \par ?CT colonography (also known as virtual colonoscopy or CTC) – CTC looks for cancer and polyps using a special X-ray called a "CT scan." For most CTC tests, the preparation is the same as it is for colonoscopy.\par \par \par •Advantages of this test – CTC can find polyps and cancers in the whole colon without the need for medicines to relax.\par \par \par •Drawbacks to this test – If doctors find polyps or cancer with CTC, they usually follow up with a colonoscopy. CTC sometimes finds areas that look abnormal but that turn out to be healthy. This means that CTC can lead to tests and procedures you did not need. Plus, CTC exposes you to radiation. In most cases, the preparation needed to clean the bowel is the same as the one needed for a colonoscopy. The test is expensive, and some insurance companies might not cover this test for screening.\par \par \par ?Stool test for blood – "Stool" is another word for bowel movements. Stool tests most commonly check for blood in samples of stool. Cancers and polyps can bleed, and if they bleed around the time you do the stool test, then blood will show up on the test. The test can find even small amounts of blood that you can't see in your stool. Other less serious conditions can also cause small amounts of blood in the stool, and that will show up in this test. You will have to collect small samples from your bowel movements, which you will put in a special container you get from your doctor or nurse. Then you follow the instructions to mail the container out for the testing.\par \par \par •Advantages of this test – This test does not involve cleaning out the colon or having any procedures.\par \par \par •Drawbacks to this test – Stool tests are less likely to find polyps than other screening tests. These tests also often come up abnormal even in people who do not have cancer. If a stool test shows something abnormal, doctors usually follow up with a colonoscopy.\par \par \par ?Stool DNA test – The stool DNA test checks for genetic markers of cancer, as well as for signs of blood. For this test, you get a special kit in order to collect a whole bowel movement. Then you follow the instructions about how and where to ship it.\par \par \par •Advantages of this test – This test does not involve cleaning out the colon or having any procedures. When cancer is not present, it is less likely to be falsely abnormal than a stool test for blood. That means it leads to fewer unnecessary colonoscopies.\par \par \par •Drawbacks to this test – It might be unpleasant to collect and ship a whole bowel movement. If a DNA test shows something abnormal, doctors usually follow up with a colonoscopy.\par \par \par There is no blood test that most experts think is accurate enough to use for screening.\par \par How do I choose which test to have? — Work with your doctor or nurse to decide which test is best for you. Some doctors might choose to combine screening tests, for example, sigmoidoscopy plus stool testing for blood. Being screened–no matter how–is more important than which test you choose.\par \par Who should be screened for colon cancer? — Doctors recommend that most people begin having colon cancer screening at age 50. People who have an increased risk of getting colon cancer sometimes begin screening at a younger age. That might include people with a strong family history of colon cancer, and people with diseases of the colon called "Crohn's disease" and "ulcerative colitis."\par \par Most people can stop being screened around the age of 75, or at the latest 85.\par \par How often should I be screened? — That depends on your risk of colon cancer and which test you have. People who have a high risk of colon cancer often need to be tested more often and should have a colonoscopy.\par \par Most people are not at high risk, so they can choose one of these schedules:\par \par ?Colonoscopy every 10 years\par \par ?CT colonography (CTC) every 5 years\par \par ?Sigmoidoscopy every 5 to 10 years\par \par ?Stool testing for blood once a year\par \par ?Stool DNA testing every 3 years (but doctors are not yet sure of the best time frame)\par 7. fatty liver fu us of abd Fatty Liver: The patient denies any jaundice or pruritus. The patient denies any alcohol use. The patient denies taking large doses of nonsteroidal anti-inflammatory drugs or acetaminophen. The findings are suggestive of fatty liver. The patient and I had a long discussion regarding the risks of fatty liver progressing to cirrhosis. The patient was told of the possible increased risk of developing liver failure, cirrhosis, ascites, GI bleeding secondary to varices, hepatic encephalopathy, bleeding tendencies and liver cancer. The patient was told of the importance of follow-up. The patient was advised to follow up every 6 months for blood work and imaging studies. The patient agreed and will follow up. The patient was advised to lose weight. I recommend a trial of vitamin E supplementation for the fatty liver. If the liver enzymes remain elevated, the patient may require a trial of Pioglitazone for the fatty liver. I recommend avoid alcohol and hepato-toxic agents. The patient was also advised to avoid NSAIDs, Acetaminophen and any other hepatotoxic drugs. The patient was also advised not to share needles, razors, scissors, nail clippers, etc.. The patient is to continue close follow-up in our office for blood work and exams. If the liver enzymes remain elevated, the patient may require a CT guided liver biopsy to assess the liver parenchyma and for possible treatment. We had a long discussion regarding the risks and benefits of the procedure. The patient was told of the risks of bleeding, perforation, infections, emergency surgery and missing lesions. The patient agreed and will follow-up to reassess the symptoms Patient has been counseled on life style interventions, including but not limited to: weight loss (3-5% loss of body weight might improve fat in the liver, while 7-10% needed for potential improvement of other components, including inflammation and scarring of the liver, called fibrosis); healthy diet, avoiding added sugars, sodas, avoiding saturated fats, limiting sodium, avoiding alcohol; and on the importance of regular exercise (> 150 min/week moderate intensity aerobic exercise with at least 2x/week muscle strengthening or exercise as tolerated). \par - I explained to patient the natural Hx of NAFLD. \par 8 skin cancer refer for derm fu  and screening  \par 9 enlarged prostate psa  and ua

## 2022-03-21 NOTE — COUNSELING
[Fall prevention counseling provided] : Fall prevention counseling provided [Adequate lighting] : Adequate lighting [No throw rugs] : No throw rugs [Use proper foot wear] : Use proper foot wear [Use recommended devices] : Use recommended devices [Sleep ___ hours/day] : Sleep [unfilled] hours/day [Engage in a relaxing activity] : Engage in a relaxing activity [Plan in advance] : Plan in advance [Potential consequences of obesity discussed] : Potential consequences of obesity discussed [Benefits of weight loss discussed] : Benefits of weight loss discussed [Structured Weight Management Program suggested:] : Structured weight management program suggested [Encouraged to maintain food diary] : Encouraged to maintain food diary [Encouraged to increase physical activity] : Encouraged to increase physical activity [Encouraged to use exercise tracking device] : Encouraged to use exercise tracking device [Weigh Self Weekly] : weigh self weekly [Decrease Portions] : decrease portions [____ min/wk Activity] : [unfilled] min/wk activity [Keep Food Diary] : keep food diary [FreeTextEntry2] : ideal 156-179 he is 89  bmi 24

## 2022-03-21 NOTE — HISTORY OF PRESENT ILLNESS
[FreeTextEntry1] : cpe  [de-identified] : Pt is a 71 yr old man DM type 2, pituitary adenoma, hypertension, psoriasis and arthropathy who is here for his cpe. \par

## 2022-03-21 NOTE — PHYSICAL EXAM
[Well Developed] : well developed [Well Nourished] : well nourished [Normal Voice Quality] : was normal [Normal Verbal Skills] : the patient had normal verbal communication skills [Normal Nonverbal Skills] : normal nonverbal communication skills were demonstrated [Normal Appearance] : was normal in appearance [Neck Supple] : was supple [Rate ___] : at [unfilled] breaths per minute [Normal Rhythm/Effort] : normal respiratory rhythm and effort [Clear Bilaterally] : the lungs were clear to auscultation bilaterally [Normal to Percussion] : the lungs were normal to percussion [Heart Rate ___] : [unfilled] bpm [Rhythm Regular] : regular [Normal Rate] : normal [Normal S1] : normal S1 [Normal S2] : normal S2 [No Murmur] : no murmurs heard [No Pitting Edema] : no pitting edema present [Rt] : varicose veins of the right leg noted [Lt] : varicose veins of the left leg noted [2+] : left 2+ [No Abnormalities] : the abdominal aorta was not enlarged and no bruit was heard [Examination Of The Breasts] : a normal appearance [No Discharge] : no discharge [Soft, Nontender] : the abdomen was soft and nontender [No Mass] : no masses were palpated [No HSM] : no hepatosplenomegaly noted [Abdomen Hernia Umbilical] : an umbilical hernia was present [] : which was reducible [None] : no CVA tenderness [Normal rectal exam] : was normal [Stool Sample Taken] : a stool sample was obtained [Penis Abnormality] : normal circumcised penis [Testes Tenderness] : no tenderness of the testes [Testes Mass (___cm)] : there were no testicular masses [Prostate Enlarged] : was enlarged [No Lymphangitis] : no lymphangitis observed [Normal Lordosis] : normal lordosis [No Visual Abnormalities] : no visible abnormalities [No Scoliosis] : no scoliosis [No Tenderness to Palpation] : no spine tenderness on palpation [No Masses] : no masses [Full ROM] : full ROM [No Pain with ROM] : no pain with motion in any direction [Intact] : all reflexes within normal limits bilaterally [Normal Station and Gait] : the gait and station were normal [Normal Motor Tone] : the muscle tone was normal [Involuntary Movements] : no involuntary movements were seen [Thin Hair] : thin hair [Examination Of The Hair] : texture and distribution of hair was normal [Complexion Fair] : fair complexion [Tattoo - Single] : a single tattoo was observed [Normal] : the deep tendon reflexes were normal [Normal Mental Status] : the patient's orientation, memory, attention, language and fund of knowledge were normal [Appropriate] : appropriate [Enlarged Diffusely] : was not enlarged [JVP Elevated ___cm] : the JVP was not elevated [S3] : no S3 [S4] : no S4 [Right Carotid Bruit] : no bruit heard over the right carotid [Left Carotid Bruit] : no bruit heard over the left carotid [Right Femoral Bruit] : no bruit heard over the right femoral artery [Left Femoral Bruit] : no bruit heard over the left femoral artery [Bruit] : no bruit heard [Excoriation] : no perianal excoriation [Multiple Sinus Tracts] : no perianal sinus tracts [Internal Hemorrhoid] : no internal hemorrhoids were present [Tender, Swollen] : nontender, non-swollen [Occult Blood Positive] : was negative for occult blood [Postauricular Lymph Nodes Enlarged Bilaterally] : nodes not enlarged [Preauricular Lymph Nodes Enlarged Bilaterally] : nodes not enlarged [Submandibular Lymph Nodes Enlarged Bilaterally] : nodes not enlarged [Suboccipital Lymph Nodes Enlarged Bilaterally] : nodes not enlarged [Submental Lymph Nodes Enlarged] : nodes not enlarged [Cervical Lymph Nodes Enlarged Posterior Bilaterally] : nodes not enlarged [Cervical Lymph Nodes Enlarged Anterior Bilaterally] : nodes not enlarged [Supraclavicular Lymph Nodes Enlarged Bilaterally] : nodes not enlarged [Axillary Lymph Nodes Enlarged Bilaterally] : nodes not enlarged [Epitrochlear Lymph Nodes Enlarged Bilaterally] : nodes not enlarged [Femoral Lymph Nodes Enlarged Bilaterally] : nodes not enlarged [Inguinal Lymph Nodes Enlarged Bilaterally] : nodes not enlarged [Impaired judgment] : intact judgment [Impaired Insight] : intact insight [de-identified] : uday normal teeth in good repair

## 2022-03-23 LAB
25(OH)D3 SERPL-MCNC: 37.6 NG/ML
ALBUMIN SERPL ELPH-MCNC: 4.8 G/DL
ALP BLD-CCNC: 100 U/L
ALT SERPL-CCNC: 19 U/L
ANION GAP SERPL CALC-SCNC: 16 MMOL/L
APPEARANCE: CLEAR
AST SERPL-CCNC: 19 U/L
BACTERIA: NEGATIVE
BASOPHILS # BLD AUTO: 0.03 K/UL
BASOPHILS NFR BLD AUTO: 0.3 %
BILIRUB SERPL-MCNC: 0.4 MG/DL
BILIRUBIN URINE: NEGATIVE
BLOOD URINE: NEGATIVE
BUN SERPL-MCNC: 18 MG/DL
CALCIUM SERPL-MCNC: 9.4 MG/DL
CHLORIDE SERPL-SCNC: 105 MMOL/L
CHOLEST SERPL-MCNC: 238 MG/DL
CO2 SERPL-SCNC: 23 MMOL/L
COLOR: YELLOW
CREAT SERPL-MCNC: 1.17 MG/DL
EGFR: 66 ML/MIN/1.73M2
EOSINOPHIL # BLD AUTO: 0.09 K/UL
EOSINOPHIL NFR BLD AUTO: 1 %
ESTIMATED AVERAGE GLUCOSE: 143 MG/DL
FOLATE SERPL-MCNC: >20 NG/ML
FRUCTOSAMINE SERPL-MCNC: 272 UMOL/L
GLUCOSE QUALITATIVE U: NEGATIVE
GLUCOSE SERPL-MCNC: 136 MG/DL
HBA1C MFR BLD HPLC: 6.6 %
HCT VFR BLD CALC: 47.8 %
HDLC SERPL-MCNC: 51 MG/DL
HGB BLD-MCNC: 15.5 G/DL
HYALINE CASTS: 1 /LPF
IMM GRANULOCYTES NFR BLD AUTO: 0.3 %
KETONES URINE: NEGATIVE
LDLC SERPL CALC-MCNC: 120 MG/DL
LEUKOCYTE ESTERASE URINE: NEGATIVE
LYMPHOCYTES # BLD AUTO: 1.63 K/UL
LYMPHOCYTES NFR BLD AUTO: 18 %
MAN DIFF?: NORMAL
MCHC RBC-ENTMCNC: 31.4 PG
MCHC RBC-ENTMCNC: 32.4 GM/DL
MCV RBC AUTO: 96.8 FL
MICROSCOPIC-UA: NORMAL
MONOCYTES # BLD AUTO: 0.71 K/UL
MONOCYTES NFR BLD AUTO: 7.8 %
NEUTROPHILS # BLD AUTO: 6.58 K/UL
NEUTROPHILS NFR BLD AUTO: 72.6 %
NITRITE URINE: NEGATIVE
NONHDLC SERPL-MCNC: 187 MG/DL
PH URINE: 6
PLATELET # BLD AUTO: 201 K/UL
POTASSIUM SERPL-SCNC: 4.5 MMOL/L
PROT SERPL-MCNC: 7.1 G/DL
PROTEIN URINE: ABNORMAL
PSA SERPL-MCNC: 2.85 NG/ML
RBC # BLD: 4.94 M/UL
RBC # FLD: 14.5 %
RED BLOOD CELLS URINE: 3 /HPF
SODIUM SERPL-SCNC: 144 MMOL/L
SPECIFIC GRAVITY URINE: 1.02
SQUAMOUS EPITHELIAL CELLS: 1 /HPF
TRIGL SERPL-MCNC: 334 MG/DL
UROBILINOGEN URINE: NORMAL
VIT B12 SERPL-MCNC: 621 PG/ML
WBC # FLD AUTO: 9.07 K/UL
WHITE BLOOD CELLS URINE: 1 /HPF

## 2022-05-10 ENCOUNTER — RX RENEWAL (OUTPATIENT)
Age: 72
End: 2022-05-10

## 2022-07-25 ENCOUNTER — APPOINTMENT (OUTPATIENT)
Dept: INTERNAL MEDICINE | Facility: CLINIC | Age: 72
End: 2022-07-25

## 2022-07-25 VITALS
BODY MASS INDEX: 27.3 KG/M2 | TEMPERATURE: 98.1 F | WEIGHT: 195 LBS | SYSTOLIC BLOOD PRESSURE: 119 MMHG | HEIGHT: 71 IN | HEART RATE: 62 BPM | DIASTOLIC BLOOD PRESSURE: 67 MMHG | OXYGEN SATURATION: 96 %

## 2022-07-25 DIAGNOSIS — R31.29 OTHER MICROSCOPIC HEMATURIA: ICD-10-CM

## 2022-07-25 PROCEDURE — 99214 OFFICE O/P EST MOD 30 MIN: CPT

## 2022-07-25 NOTE — HEALTH RISK ASSESSMENT
[Yes] : Yes [2 - 3 times a week (3 pts)] : 2 - 3  times a week (3 points) [3 or 4 (1 pt)] : 3 or 4  (1 point) [No falls in past year] : Patient reported no falls in the past year [0] : 2) Feeling down, depressed, or hopeless: Not at all (0) [PHQ-2 Negative - No further assessment needed] : PHQ-2 Negative - No further assessment needed [de-identified] : none  [de-identified] : diabetic diet  sometimes   [[id number]] : 0

## 2022-07-25 NOTE — HISTORY OF PRESENT ILLNESS
[Spouse] : spouse [FreeTextEntry1] : fu  [de-identified] : Pt is feeling well and doesn’t have increased urination or thirst.  He has gained 5 lbs he -denies any headaches, nausea, vomiting, fever, chills, sweats, chest pain, chest pressure, diarrhea, constipation, dysphagia, sour taste in the mouth, dizziness, leg swelling, leg pain, myalgias, arthralgias, itchy eyes, itchy ears, heartburn, or reflux.\par \par \par He has been drinking more beer  and 4-5 cans drinking 12 cans a week.    He is having  knee pain   . he had covid a few weeks ago.     He has knee pain and lower back pain   and has ra and psoriatic  arthritis.

## 2022-07-25 NOTE — ASSESSMENT
[FreeTextEntry1] :   1 dm  ---The following has been discussed:---\par -Targets for weight and HgA1c have been discussed with patient \par -FS goals have been reviewed with the patient in detail:\par AM <130 post meal<160-180\par -Diet and weight goals have been discussed with the patient in detail.\par -The importance of exercise in the treatment of diabetes has been discussed \par with the patient in detail.\par -Extensive dietary advice provided to patient and the need to avoid concentrated \par sweets/simple carbohydrates and to ensure to consume protein with each meal. \par -Patient instructed to limit carbohydrates to 60 gms per meal and 15 gms per \par snacks. \par -Patient to keep a blood sugar log to check fasting and before meals\par -Patient instructed on importance of daily feet inspection and to reports any \par open lesions to physician promptly\par \par 2  hld  Discussed intake of plant based foods, including vegetables, fruits, and whole grain foods: legumes, nuts and seeds, fish or seafood, lean meats, and non-fat or low-fat diary foods. Plant based oils (non-tropical) in place of solid fats. Instructed patient to limit intake of high fat meats and processed meats, high-fat diary foods, dietary cholesterol and sodium, foods and beverages with added sugars. \par 3 htn   well controlled    continue present management\par 4. psoriasis  fu with rhem  \par 5 arthritis  refer for pt due to lower back pain  knee pain   Pts that consume certain foods can worsen their symptoms of arthritis.  The five worse foods are bagels, gluten, French fries and processed fast foods, trans fats  such as soybean, sunflower oil, omega 6 , vegetable oil and corn oil .  Blackened and barbecue foods  Advanced glycogen enzymes , night shade vegetables such as tomatoes, peppers, potatoes , and sugars.  Foods that help   tart cherries, broccoli, papaya, olive oil probiotics nuts such as almonds, pineapple  green tea.  Papain breaks down protein and bromelin reduces inflammation tumeric destroys free radicals and devils claw reduces arthritis pain.  as well as ginger extract , rutin , Caruthers yucca root, citrus bioflavonoids  and Boswelia extract.\par 6 colon cancer screening  he has  not done fit test and will go. \par 7  back exercise   Unless acute low back pain is caused by a serious medical condition (which is uncommon), it typically resolves fairly quickly, even if there is a bulging or herniated disc.\par Still, low back pain can make it hard to do your usual activities, and it can be frustrating to feel like you just have to wait for it to get better. Below are some simple things you can do that may help relieve your pain.\par Remaining active — Many people are afraid that they will hurt their back further or delay recovery by remaining active. However, remaining active, to the extent that you are able, is one of the best things you can do for your back.\par If you have severe pain, you may need to rest your back for a day or so. It may be most comfortable to lie on your back with a pillow under your knees and your head and shoulders elevated. For sleeping, you may want to lie on your side with your upper knee bent and a pillow between your knees. However, prolonged bed rest is not recommended. Studies have shown that people with low back pain recover faster when they remain active. Movement helps to relieve muscle spasms and prevents loss of muscle strength.\par While you should avoid strenuous activities and sports while you are in pain, it is fine to continue doing regular day-to-day activities and light exercises, such as walking. If certain activities cause your back to hurt too much, try something else instead.\par Heat — Using a heating pad or heated wrap can help with low back pain during the first few weeks. It is not clear if cold helps as well, but some people may find that it relieves pain temporarily.\par Modifications at work — Most experts recommend that people with low back pain continue to work so long as it is possible to avoid prolonged standing or sitting and heavy lifting. If your job does not allow you to sit or stand comfortably, you may need to take some time off work while you recover. While standing at work, stepping on a block of wood with one foot (and periodically alternating the foot on the block) may be helpful.\par Pain medications — You can try taking an over-the-counter medication to help relieve pain. Nonsteroidal antiinflammatory drugs (NSAIDs), such as ibuprofen (sample brand names: Advil, Motrin) and naproxen (brand name: Aleve), may work better than acetaminophen (brand name: Tylenol) for low back pain.\par If you do take pain medication, it may be more effective to take a dose on a regular basis for three to five days, rather than using the medication only when your pain becomes unbearable. Muscle relaxants (eg, cyclobenzaprine [brand name: Flexeril]) are prescription medications; while these may help relieve back pain, they can cause drowsiness and are probably no better than ibuprofen in relieving pain. Your health care provider can talk to you about whether muscle relaxants might help in your situation. They may be helpful before bedtime when used for a short time, ie, a week or two. People who need to be alert, such as while driving or operating machinery, should not use muscle relaxants.\par Opioids (drugs derived from morphine) are not recommended for most people with back pain. In rare situations, a health care provider might prescribe them for a few days if a person has severe pain that is not responding to other treatments, but they are generally not much more effective than NSAIDs. In addition, opioids have a relatively high risk of side effects and the potential to cause harm, including the risk of dependency and abuse.\par Exercise — Starting a new exercise program immediately after a new episode of low back pain will not speed recovery from the acute episode. However, there is evidence that exercise is beneficial in people with chronic back pain. Spinal manipulation — "Spinal manipulation" is a technique sometimes used by chiropractors, physical therapists, osteopaths, massage therapists, and others to relieve back pain. It involves moving the joints of the spine beyond the normal range of motion. Studies suggest that spinal manipulation may provide modest pain relief and improved function, and it generally appears to be safe if performed by an experienced professional. If you are interested in trying this approach, talk with your health care provider about how to integrate it into your treatment plan.\par Acupuncture — Acupuncture involves inserting very fine needles into specific points, as determined by traditional Chinese maps of the body's flow of energy. There is not consistent evidence that acupuncture is effective for people with acute low back pain; however, some people find it helpful.\par Massage — There is no evidence that massage is effective in treating acute low back pain. However, you may find that it is generally relaxing and helps you feel better temporarily.\par Psychological therapy — In some cases, mental health issues can contribute to low back pain. Psychological therapy has mostly been studied in the context of treating longer-term (chronic) back pain; however, it may be beneficial for some people with acute pain as well. Other treatments — You may have heard of other treatments that claim to relieve back pain. Unfortunately, most of these have not been proven to work or are only effective in specific situations. Examples include:\par ?Injections – Some clinicians recommend injections of a local anesthetic (numbing medication) into the soft tissues of the back, although it is not clear if these injections are effective. The areas targeted by these injections are called "trigger points." Trigger-point injections may be of benefit in some people with chronic back pain, but they are typically not recommended for treating acute pain.\par Injections of a glucocorticoid (steroid) medication are sometimes recommended for people with chronic low back pain with sciatica or radiculopathy The injection is given into the epidural space, which is located below the spinal cord. Epidural glucocorticoid injections do appear to improve pain slightly at two and six weeks after the injection, but not at 3, 6, or 12 months after the injection. There is no evidence that epidural steroid injections are helpful for people with back pain without sciatica.\par ?Corsets and braces – While wearable supportive garments may claim to help relieve or prevent low back pain, these are typically not effective.\par ?Traction – Traction involves the use of weights to realign or pull the spinal column into alignment. Clinical studies have shown no benefit from traction in the treatment of acute back pain.\par ?Switching to a firmer mattress – People often wonder if sleeping on a firmer mattress can help prevent or treat low back pain. Small studies have suggested that using a less firm mattress may actually be more likely to relieve pain; however, there is not enough evidence to support switching to a specific type of sleeping surface for this reason.\par ?Methods involving energy or electricity – Other interventions include ultrasound, interferential therapy, shortwave diathermy, transcutaneous electrical nerve stimulation, and low-level laser therapy, all of which involve applying energy to the skin's surface. None of these interventions have been proven to be effective, particularly during the first four to six weeks of an episode of back pain.\par CHRONIC LOW BACK PAIN TREATMENTWhile most people recover completely from an episode of acute low back pain, some people do go on to have longer-term pain. Chronic pain is typica\par 8 .  alochol use   decreased  alcohol consumption

## 2022-07-25 NOTE — COUNSELING
[Fall prevention counseling provided] : Fall prevention counseling provided [Adequate lighting] : Adequate lighting [No throw rugs] : No throw rugs [Use proper foot wear] : Use proper foot wear [Use recommended devices] : Use recommended devices [Sleep ___ hours/day] : Sleep [unfilled] hours/day [Engage in a relaxing activity] : Engage in a relaxing activity [Plan in advance] : Plan in advance [Potential consequences of obesity discussed] : Potential consequences of obesity discussed [Benefits of weight loss discussed] : Benefits of weight loss discussed [Structured Weight Management Program suggested:] : Structured weight management program suggested [Encouraged to maintain food diary] : Encouraged to maintain food diary [Encouraged to increase physical activity] : Encouraged to increase physical activity [Encouraged to use exercise tracking device] : Encouraged to use exercise tracking device [FreeTextEntry1] : diabetic low fat

## 2022-07-25 NOTE — PHYSICAL EXAM
[Normal Sclera/Conjunctiva] : normal sclera/conjunctiva [PERRL] : pupils equal round and reactive to light [Normal Oropharynx] : the oropharynx was normal [Supple] : supple [Normal Rate] : normal rate  [Regular Rhythm] : with a regular rhythm [Normal S1, S2] : normal S1 and S2 [No Edema] : there was no peripheral edema [No Extremity Clubbing/Cyanosis] : no extremity clubbing/cyanosis [Soft, Nontender] : the abdomen was soft and nontender [No Mass] : no masses were palpated [No HSM] : no hepatosplenomegaly noted [Normal Posterior Cervical Nodes] : no posterior cervical lymphadenopathy [Normal Anterior Cervical Nodes] : no anterior cervical lymphadenopathy [No CVA Tenderness] : no CVA  tenderness [No Spinal Tenderness] : no spinal tenderness [Normal Station and Gait] : the gait and station were normal [Normal] : motor strength was normal in all muscle groups [Normal Motor Tone] : the muscle tone was normal [Involuntary Movements] : no involuntary movements were seen [Normal Scalp] : inspection of the scalp showed no abnormalities [Examination Of The Hair] : texture and distribution of hair was normal [Complexion Fair] : fair complexion [Skin Lesions 1] : Skin lesion: [Coordination Grossly Intact] : coordination grossly intact [No Focal Deficits] : no focal deficits [Normal Gait] : normal gait [Normal Affect] : the affect was normal [Normal Insight/Judgement] : insight and judgment were intact [Comprehensive Foot Exam Normal] : Right and left foot were examined and both feet are normal. No ulcers in either foot. Toes are normal and with full ROM.  Normal tactile sensation with monofilament testing throughout both feet

## 2022-07-26 LAB
25(OH)D3 SERPL-MCNC: 39.8 NG/ML
ALBUMIN SERPL ELPH-MCNC: 4.6 G/DL
ALP BLD-CCNC: 97 U/L
ALT SERPL-CCNC: 28 U/L
ANION GAP SERPL CALC-SCNC: 14 MMOL/L
AST SERPL-CCNC: 21 U/L
BASOPHILS # BLD AUTO: 0.04 K/UL
BASOPHILS NFR BLD AUTO: 0.4 %
BILIRUB SERPL-MCNC: 0.4 MG/DL
BUN SERPL-MCNC: 18 MG/DL
CALCIUM SERPL-MCNC: 9.2 MG/DL
CHLORIDE SERPL-SCNC: 103 MMOL/L
CHOLEST SERPL-MCNC: 147 MG/DL
CO2 SERPL-SCNC: 25 MMOL/L
CREAT SERPL-MCNC: 1.16 MG/DL
CREAT SPEC-SCNC: 139 MG/DL
EGFR: 67 ML/MIN/1.73M2
EOSINOPHIL # BLD AUTO: 0.09 K/UL
EOSINOPHIL NFR BLD AUTO: 0.9 %
ESTIMATED AVERAGE GLUCOSE: 180 MG/DL
FRUCTOSAMINE SERPL-MCNC: 285 UMOL/L
GLUCOSE SERPL-MCNC: 153 MG/DL
HBA1C MFR BLD HPLC: 7.9 %
HCT VFR BLD CALC: 48 %
HDLC SERPL-MCNC: 51 MG/DL
HGB BLD-MCNC: 15.2 G/DL
IMM GRANULOCYTES NFR BLD AUTO: 0.3 %
LDLC SERPL CALC-MCNC: 50 MG/DL
LYMPHOCYTES # BLD AUTO: 2.02 K/UL
LYMPHOCYTES NFR BLD AUTO: 20.9 %
MAN DIFF?: NORMAL
MCHC RBC-ENTMCNC: 30.9 PG
MCHC RBC-ENTMCNC: 31.7 GM/DL
MCV RBC AUTO: 97.6 FL
MICROALBUMIN 24H UR DL<=1MG/L-MCNC: <1.2 MG/DL
MICROALBUMIN/CREAT 24H UR-RTO: NORMAL MG/G
MONOCYTES # BLD AUTO: 0.7 K/UL
MONOCYTES NFR BLD AUTO: 7.2 %
NEUTROPHILS # BLD AUTO: 6.79 K/UL
NEUTROPHILS NFR BLD AUTO: 70.3 %
NONHDLC SERPL-MCNC: 96 MG/DL
PLATELET # BLD AUTO: 195 K/UL
POTASSIUM SERPL-SCNC: 4.8 MMOL/L
PROT SERPL-MCNC: 6.5 G/DL
RBC # BLD: 4.92 M/UL
RBC # FLD: 13.6 %
SODIUM SERPL-SCNC: 143 MMOL/L
TRIGL SERPL-MCNC: 231 MG/DL
VZV AB TITR SER: POSITIVE
VZV IGG SER IF-ACNC: 2806 INDEX
WBC # FLD AUTO: 9.67 K/UL

## 2022-08-02 ENCOUNTER — OUTPATIENT (OUTPATIENT)
Dept: OUTPATIENT SERVICES | Facility: HOSPITAL | Age: 72
LOS: 1 days | End: 2022-08-02
Payer: MEDICARE

## 2022-08-02 ENCOUNTER — APPOINTMENT (OUTPATIENT)
Dept: MRI IMAGING | Facility: IMAGING CENTER | Age: 72
End: 2022-08-02

## 2022-08-02 DIAGNOSIS — D35.2 BENIGN NEOPLASM OF PITUITARY GLAND: ICD-10-CM

## 2022-08-02 PROCEDURE — 70553 MRI BRAIN STEM W/O & W/DYE: CPT

## 2022-08-02 PROCEDURE — 70553 MRI BRAIN STEM W/O & W/DYE: CPT | Mod: 26,MH

## 2022-08-17 NOTE — ED PROVIDER NOTE - [id number]_ED_A_ED
Scribe Attestation (For Scribes USE Only)... Scribe Attestation (For Scribes USE Only).../Attending Attestation (For Attendings USE Only)... Single Mechanical/Accidental Fall

## 2022-09-11 ENCOUNTER — RX RENEWAL (OUTPATIENT)
Age: 72
End: 2022-09-11

## 2022-10-06 ENCOUNTER — RX RENEWAL (OUTPATIENT)
Age: 72
End: 2022-10-06

## 2022-10-25 ENCOUNTER — APPOINTMENT (OUTPATIENT)
Dept: INTERNAL MEDICINE | Facility: CLINIC | Age: 72
End: 2022-10-25

## 2022-10-25 VITALS
HEART RATE: 67 BPM | SYSTOLIC BLOOD PRESSURE: 134 MMHG | HEIGHT: 71 IN | BODY MASS INDEX: 26.6 KG/M2 | TEMPERATURE: 97.7 F | WEIGHT: 190 LBS | OXYGEN SATURATION: 96 % | DIASTOLIC BLOOD PRESSURE: 79 MMHG

## 2022-10-25 DIAGNOSIS — N28.9 DISORDER OF KIDNEY AND URETER, UNSPECIFIED: ICD-10-CM

## 2022-10-25 DIAGNOSIS — M85.80 OTHER SPECIFIED DISORDERS OF BONE DENSITY AND STRUCTURE, UNSPECIFIED SITE: ICD-10-CM

## 2022-10-25 PROCEDURE — 99214 OFFICE O/P EST MOD 30 MIN: CPT

## 2022-10-25 RX ORDER — SECUKINUMAB 150 MG/ML
150 INJECTION SUBCUTANEOUS
Qty: 1 | Refills: 0 | Status: COMPLETED | COMMUNITY
Start: 2017-08-10 | End: 2022-10-25

## 2022-10-25 NOTE — HISTORY OF PRESENT ILLNESS
[FreeTextEntry1] : fu  [de-identified] : Pt  is feeling well .  He was stopped on cosentyx  since it was  no longer working and  was placed on  stelera  but is waiting for approval.   He feels stiff in his joint since he is not on anything.      He also saw dermatologist .  He has been off  for one month.    He takes Tylenol for the pain.    He states he is trying to watch his diet and ice cream consumption. He doesn’t have increased urination or thirst.  he has lost  5lbs .   He will increase glimepiride to  1mg  tab   He has  skin erythema  of lower extremities and  was given clobetasol  which is not helping.   he has a fu appt   he doesn’t want a flu vaccine.

## 2022-10-25 NOTE — PHYSICAL EXAM
[PERRL] : pupils equal round and reactive to light [Normal Oropharynx] : the oropharynx was normal [Supple] : supple [Normal Rate] : normal rate  [Regular Rhythm] : with a regular rhythm [Normal S1, S2] : normal S1 and S2 [No Edema] : there was no peripheral edema [No Extremity Clubbing/Cyanosis] : no extremity clubbing/cyanosis [Normal Posterior Cervical Nodes] : no posterior cervical lymphadenopathy [Normal Anterior Cervical Nodes] : no anterior cervical lymphadenopathy [No CVA Tenderness] : no CVA  tenderness [Grossly Normal Strength/Tone] : grossly normal strength/tone [Normal Scalp] : inspection of the scalp showed no abnormalities [Examination Of The Hair] : texture and distribution of hair was normal [Complexion Fair] : fair complexion [Skin Lesions 1] : Skin lesion: [Coordination Grossly Intact] : coordination grossly intact [Normal] : affect was normal and insight and judgment were intact [de-identified] : psoriatic  joint deformities of feet and hands.

## 2022-10-25 NOTE — HEALTH RISK ASSESSMENT
[Yes] : Yes [4 or more  times a week (4 pts)] : 4 or more  times a week (4 points) [3 or 4 (1 pt)] : 3 or 4  (1 point) [Never (0 pts)] : Never (0 points) [No] : In the past 12 months have you used drugs other than those required for medical reasons? No [No falls in past year] : Patient reported no falls in the past year [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  and rheumatolkogist  [Audit-CScore] : 5 [de-identified] : none  [de-identified] : diabetic diet

## 2022-10-25 NOTE — ASSESSMENT
[FreeTextEntry1] : 1  dm  he is not well controlled and we discussed alochol  use and needs to rstop  ---The following has been discussed:---\par -Targets for weight and HgA1c have been discussed with patient \par -FS goals have been reviewed with the patient in detail:\par AM <130 post meal<160-180\par -Diet and weight goals have been discussed with the patient in detail.\par -The importance of exercise in the treatment of diabetes has been discussed \par with the patient in detail.\par -Extensive dietary advice provided to patient and the need to avoid concentrated \par sweets/simple carbohydrates and to ensure to consume protein with each meal. \par -Patient instructed to limit carbohydrates to 60 gms per meal and 15 gms per \par snacks. \par -Patient to keep a blood sugar log to check fasting and before meals\par -Patient instructed on importance of daily feet inspection and to reports any \par open lesions to physician promptly\par \par  If you are overweight or obese, losing weight can improve blood sugar control and lower your blood pressure and cholesterol levels.\par \par Losing weight — Your health care provider can help you set goals for losing weight. For a person who is overweight or obese, a typical goal is to lose 5 to 10 percent of their body weight. For a person who weighs 240 pounds, this would mean losing 12 to 24 pounds. Losing even more weight can sometimes reduce the blood sugars to the normal range. But even losing a little bit of weight can help improve your health; in fact, cutting back on the number of calories you eat each day can lower your blood sugar levels even before you actually lose weight.\par \par The initial approach to weight loss usually involves eating fewer calories and exercising regularly. There are other strategies that may be appropriate for people who are having trouble losing weight, including medications and surgery. More information about losing weight is available separately. (See "Patient education: Losing weight (Beyond the Basics)".)\par \par Recommended calorie intake — The number of calories you need to maintain your current weight depends upon your age, sex, height, weight, and activity level. Below are some general guidelines:\par \par ?Men, active women – 15 calories per pound\par \par ?Most women, sedentary men, and adults over 55 years – 13 calories per pound\par \par ?Sedentary women, obese adults – 10 calories per pound\par \par ?Pregnant, lactating women – 15 to 17 calories per pound\par \par \par To lose 1 to 2 pounds per week (which is considered a safe rate of weight loss), you can subtract 500 to 1000 calories from the total number of calories needed to maintain weight.\par \par As an example, an obese, sedentary man who weighs 250 pounds would need to eat 10 calories per pound, which totals 2500 calories, per day to maintain his weight. To lose 1 to 2 pounds per week, he should reduce his intake to 1500 to 2000 calories per day. As you lose weight, you will need to adjust your daily recommended calorie intake accordingly.\par \par Avoiding weight gain — Some treatments for type 2 diabetes, such as intensive insulin therapy and certain oral medications, can contribute to weight gain. The following tips can help you avoid unwanted weight gain:\par \par ?Weigh yourself on a regular basis (eg, every morning). If you gain more than 2 to 3 pounds in a week, try decreasing the number of calories you eat or increasing the amount of physical activity you do. Do not wait until you have gained a larger amount of weight, as this will make it harder to lose.\par \par \par ?As your blood sugar control improves with treatment, it may be necessary to somewhat decrease your daily calorie intake to avoid weight gain.\par \par \par ?If your blood sugar levels are frequently low at a particular time of day, your provider might suggest decreasing your dose of insulin or other medication rather than adding a snack at that time.\par \par \par Exercise — Exercising regularly can help to lose weight and keep it off. Exercise has other health benefits, too. The recommended amount of exercise for most people is 30 minutes per day most days of the week. (See "Patient education: Exercise (Beyond the Basics)".)\par \par If you take insulin or oral medications that lower blood sugar levels, you will need check your blood sugar level before and after exercising. If you are doing a new exercise routine that is vigorous and prolonged (more than 30 minutes), and you expect to do the same routine again in the future, check your blood sugar every 15 minutes. This can help give you a sense of how exercise affects your blood sugar level.\par \par If your blood sugar level becomes low during exercise, eat a snack according to the guidelines below. (See "Patient education: Hypoglycemia (low blood sugar) in diabetes mellitus (Beyond the Basics)".)\par \par ?If the blood glucose is 51 to 70 mg/dL (2.8 to 3.8 mmol/L), eat 10 to 15 grams of fast-acting carbohydrate (eg, 1/2 cup fruit juice, six to eight hard candies, three to four glucose tablets).\par \par \par ?If the level is less than 50 mg/dL (2.7 mmol/L), eat 20 to 30 grams of fast-acting carbohydrates.\par \par \par ?If you are not able to check your blood sugar but you have symptoms of hypoglycemia (eg, sweating and shaking), eat 10 to 15 grams of fast-acting carbohydrate.\par \par \par Retest after 15 minutes and repeat treatment if blood glucose is still too low. If your next meal is more than an hour away, eat an additional 15 grams of carbohydrate and 1 ounce of protein (for example, crackers with cheese or one-half of a sandwich with peanut butter). It is important not to eat too much, because this can raise your blood sugar level too much and lead to weight gain over the long term.\par \par Adjusting insulin dose for exercise — If you take oral diabetes medications, you probably will not need to adjust the dose of these medications for exercise.\par \par If you take insulin, it may be possible to reduce your insulin dose before exercising to avoid developing low blood glucose. A physician, diabetes educator, dietitian, or exercise physiologist can help to determine the best way to adjust your insulin dose before, during, and after exercising. (See "Patient education: Type 2 diabetes: Insulin treatment (Beyond the Basics)".)\par \par \par TYPE 2 DIABETES AND ALCOHOL\par Drinking a moderate amount of alcohol (up to one serving per day for women or two servings per day for men) with food does not affect blood glucose levels significantly. Alcohol may cause a slight rise in blood glucose, followed hours later by a decrease in the blood glucose level. As a result, it is important to monitor blood glucose response to alcohol to determine if any changes in insulin doses are needed.\par \par Mixers, such as fruit juice or regular cola, can increase blood glucose levels and increase the number of calories consumed in a day. Also, calories from alcohol have little nutritional value and may contribute to weight gain (or make it harder to lose weight). If you take oral diabetes medications, you will not need to adjust your medication, as long as you drink the alcohol in moderation and with food.\par \par \par TYPE 2 DIABETES AND CARBOHYDRATE CONSISTENCY\par Carbohydrates are the main energy source in the diet and include starches, vegetables, fruits, dairy products, and sugars. Most meats and fats do not contain any carbohydrates.\par \par Carbohydrates directly affect your blood sugar level, whereas proteins and fat have little impact. Eating a consistent amount of carbohydrates at each meal can help to control your blood sugar levels, especially if you take certain oral diabetes medications or long-acting insulin.\par \par There are different ways to make sure you eat a consistent amount of carbohydrates throughout the day, including carbohydrate counting and exchange planning.\par \par Carbohydrate counting — A dietitian can help you figure out the number of carbohydrates you need each day based on your eating habits, weight, nutritional goals, and activity level. The way carbohydrates are divided up for each meal or snack will depend on your personal preferences, the gena\par 2.  psoriasis  he has  increased skin lesions and joint pain and will fu with rheum and is to start  stelaera.  and fu with derm. \par 3  hypertriglyceridemia  take medications  avoid fats to lower  LDL and non HDL  cholesterol levels     1 limit your intake of foods full of saturated fats  , trans fats, and dietary cholesterol .  Food with a lot of saturated fate include butter, fatty flesh like red meat, full fat and low fat dairy  products  , palm oil and coconut oil .   If you see partially hydrogenated fat in the ingredient  list of food label that food has trans fats.  Top sources of dietary chol include egg yolks , organ meats and shell fish.  one Type of fat omega 3 Fatty acids has been shown to protect against heart disease  . Good sources are cold water fish like salmon, mackerel , halibut ., trout  herring and sardines.     Limit  your intake of meat , poultry and fish to no more than 3.5  ounces per day     Eat a lot more fiber rich foods  like beans , oats, barley fruits and vegetables   .  Food naturally rich in soluble fiber  are good at lowering cholesterol .    Excellent choices  are  oats , oat bran , barley , peas , yams sweet potatoes and legumes  or beans .  Good fruit sources are berries passion fruit, oranges pears,, apricots , apples  and nectarines  .    choose protein rich plant foods   such as legumes or beans nuts and seeds over meat.     Lose as much weight as possible and exercise   Take plant sterol supplements   .A Daily intake  of 1-2 gms  of plant sterols  lowers ldl .    Best choice is supplements  such as Cholestoff  by [employer]atures made   because they don’t have calories  sugar trans fats   an salt  of many foods enriched with plant sterols.    Take  Metamucil or psyllium   .   Studies have shown 9-10 gms as daily of psyllium   the equivalent of  about  3 teaspoons  of sugar free Metamucil   reduced LDL levels  .\par 4.pti  adenoma stable unchanged  fu 2 yrs.  \par 5. osteopenia   bone density take calcium and vit d. 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

## 2022-10-26 LAB
25(OH)D3 SERPL-MCNC: 36.3 NG/ML
ALBUMIN SERPL ELPH-MCNC: 4.7 G/DL
ALP BLD-CCNC: 93 U/L
ALT SERPL-CCNC: 26 U/L
ANION GAP SERPL CALC-SCNC: 16 MMOL/L
APPEARANCE: CLEAR
AST SERPL-CCNC: 22 U/L
BASOPHILS # BLD AUTO: 0.03 K/UL
BASOPHILS NFR BLD AUTO: 0.3 %
BILIRUB SERPL-MCNC: 0.4 MG/DL
BILIRUBIN URINE: NEGATIVE
BLOOD URINE: NEGATIVE
BUN SERPL-MCNC: 18 MG/DL
CALCIUM SERPL-MCNC: 9.7 MG/DL
CHLORIDE SERPL-SCNC: 102 MMOL/L
CHOLEST SERPL-MCNC: 160 MG/DL
CO2 SERPL-SCNC: 26 MMOL/L
COLOR: YELLOW
CREAT SERPL-MCNC: 1.06 MG/DL
EGFR: 74 ML/MIN/1.73M2
EOSINOPHIL # BLD AUTO: 0.1 K/UL
EOSINOPHIL NFR BLD AUTO: 1 %
ESTIMATED AVERAGE GLUCOSE: 166 MG/DL
FOLATE SERPL-MCNC: >20 NG/ML
FRUCTOSAMINE SERPL-MCNC: 269 UMOL/L
GLUCOSE QUALITATIVE U: NEGATIVE
GLUCOSE SERPL-MCNC: 173 MG/DL
HBA1C MFR BLD HPLC: 7.4 %
HCT VFR BLD CALC: 49.5 %
HDLC SERPL-MCNC: 53 MG/DL
HGB BLD-MCNC: 15.8 G/DL
IMM GRANULOCYTES NFR BLD AUTO: 0.4 %
KETONES URINE: NEGATIVE
LDLC SERPL CALC-MCNC: 59 MG/DL
LEUKOCYTE ESTERASE URINE: NEGATIVE
LYMPHOCYTES # BLD AUTO: 1.74 K/UL
LYMPHOCYTES NFR BLD AUTO: 17.1 %
MAN DIFF?: NORMAL
MCHC RBC-ENTMCNC: 30.3 PG
MCHC RBC-ENTMCNC: 31.9 GM/DL
MCV RBC AUTO: 94.8 FL
MONOCYTES # BLD AUTO: 0.69 K/UL
MONOCYTES NFR BLD AUTO: 6.8 %
NEUTROPHILS # BLD AUTO: 7.57 K/UL
NEUTROPHILS NFR BLD AUTO: 74.4 %
NITRITE URINE: NEGATIVE
NONHDLC SERPL-MCNC: 107 MG/DL
PH URINE: 6
PLATELET # BLD AUTO: 213 K/UL
POTASSIUM SERPL-SCNC: 4.4 MMOL/L
PROT SERPL-MCNC: 7.1 G/DL
PROTEIN URINE: NORMAL
RBC # BLD: 5.22 M/UL
RBC # FLD: 14.2 %
SODIUM SERPL-SCNC: 144 MMOL/L
SPECIFIC GRAVITY URINE: 1.02
TRIGL SERPL-MCNC: 243 MG/DL
UROBILINOGEN URINE: NORMAL
VIT B12 SERPL-MCNC: 703 PG/ML
WBC # FLD AUTO: 10.17 K/UL

## 2022-11-07 ENCOUNTER — RX RENEWAL (OUTPATIENT)
Age: 72
End: 2022-11-07

## 2023-03-01 ENCOUNTER — APPOINTMENT (OUTPATIENT)
Dept: INTERNAL MEDICINE | Facility: CLINIC | Age: 73
End: 2023-03-01

## 2023-03-02 ENCOUNTER — APPOINTMENT (OUTPATIENT)
Dept: INTERNAL MEDICINE | Facility: CLINIC | Age: 73
End: 2023-03-02
Payer: MEDICARE

## 2023-03-02 VITALS — DIASTOLIC BLOOD PRESSURE: 76 MMHG | HEART RATE: 63 BPM | SYSTOLIC BLOOD PRESSURE: 132 MMHG

## 2023-03-02 VITALS
WEIGHT: 187 LBS | SYSTOLIC BLOOD PRESSURE: 147 MMHG | HEART RATE: 64 BPM | HEIGHT: 71 IN | BODY MASS INDEX: 26.18 KG/M2 | TEMPERATURE: 97.9 F | DIASTOLIC BLOOD PRESSURE: 84 MMHG | OXYGEN SATURATION: 97 %

## 2023-03-02 DIAGNOSIS — E53.8 DEFICIENCY OF OTHER SPECIFIED B GROUP VITAMINS: ICD-10-CM

## 2023-03-02 DIAGNOSIS — L98.9 DISORDER OF THE SKIN AND SUBCUTANEOUS TISSUE, UNSPECIFIED: ICD-10-CM

## 2023-03-02 PROCEDURE — 99214 OFFICE O/P EST MOD 30 MIN: CPT

## 2023-03-02 RX ORDER — MOMETASONE FUROATE 1 MG/G
0.1 OINTMENT TOPICAL DAILY
Qty: 1 | Refills: 2 | Status: COMPLETED | COMMUNITY
Start: 2020-12-21 | End: 2023-03-02

## 2023-03-02 RX ORDER — MOMETASONE FUROATE 1 MG/G
0.1 OINTMENT TOPICAL DAILY
Qty: 1 | Refills: 2 | Status: COMPLETED | COMMUNITY
Start: 2020-09-21 | End: 2023-03-02

## 2023-03-02 RX ORDER — CLOTRIMAZOLE AND BETAMETHASONE DIPROPIONATE 10; .5 MG/G; MG/G
1-0.05 CREAM TOPICAL TWICE DAILY
Qty: 1 | Refills: 3 | Status: COMPLETED | COMMUNITY
Start: 2021-04-05 | End: 2023-03-02

## 2023-03-02 RX ORDER — COAL TAR 20 %
20 SOLUTION, NON-ORAL TOPICAL
Qty: 1 | Refills: 2 | Status: COMPLETED | COMMUNITY
Start: 2021-12-13 | End: 2023-03-02

## 2023-03-02 RX ORDER — USTEKINUMAB 45 MG/.5ML
45 INJECTION, SOLUTION SUBCUTANEOUS
Qty: 1 | Refills: 0 | Status: DISCONTINUED | COMMUNITY
Start: 2022-10-06 | End: 2023-03-02

## 2023-03-02 NOTE — HEALTH RISK ASSESSMENT
[Yes] : Yes [4 or more  times a week (4 pts)] : 4 or more  times a week (4 points) [3 or 4 (1 pt)] : 3 or 4  (1 point) [Never (0 pts)] : Never (0 points) [No] : In the past 12 months have you used drugs other than those required for medical reasons? No [No falls in past year] : Patient reported no falls in the past year [0] : 2) Feeling down, depressed, or hopeless: Not at all (0) [PHQ-2 Negative - No further assessment needed] : PHQ-2 Negative - No further assessment needed [Former] : Former [de-identified] : rheum [Audit-CScore] : 5 [de-identified] : none  [de-identified] : bike  [[id number]] : 0 [de-identified] : stopped 44 yrs ago

## 2023-03-02 NOTE — HISTORY OF PRESENT ILLNESS
[FreeTextEntry1] : fu  [de-identified] :    Pt is a 71  yr  old man with DM type 2 , pit adenoma , hpn, psoriasis and arthropathy  who is here for fu.  he states he ahs devleoped a lesion on  his right side of his nose.  He -denies any headaches, nausea, vomiting, fever, chills, sweats, chest pain, chest pressure, diarrhea, constipation, dysphagia, sour taste in the mouth, dizziness, leg swelling, leg pain, myalgias, arthralgias, itchy eyes, itchy ears, heartburn, or reflux.\par \par \par   He has  has wrist pain.,    He has been checking his blood sugars and are  120s 140  .  He has seen rheumatologist  and started on  skyrizi.  \par

## 2023-03-02 NOTE — ASSESSMENT
[FreeTextEntry1] :   dm  ---The following has been discussed:---\par -Targets for weight and HgA1c have been discussed with patient \par -FS goals have been reviewed with the patient in detail:\par AM <130 post meal<160-180\par -Diet and weight goals have been discussed with the patient in detail.\par -The importance of exercise in the treatment of diabetes has been discussed \par with the patient in detail.\par -Extensive dietary advice provided to patient and the need to avoid concentrated \par sweets/simple carbohydrates and to ensure to consume protein with each meal. \par -Patient instructed to limit carbohydrates to 60 gms per meal and 15 gms per \par snacks. \par -Patient to keep a blood sugar log to check fasting and before meals\par -Patient instructed on importance of daily feet inspection and to reports any \par open lesions to physician promptly\par  If you are overweight or obese, losing weight can improve blood sugar control and lower your blood pressure and cholesterol levels.\par \par Losing weight — Your health care provider can help you set goals for losing weight. For a person who is overweight or obese, a typical goal is to lose 5 to 10 percent of their body weight. For a person who weighs 240 pounds, this would mean losing 12 to 24 pounds. Losing even more weight can sometimes reduce the blood sugars to the normal range. But even losing a little bit of weight can help improve your health; in fact, cutting back on the number of calories you eat each day can lower your blood sugar levels even before you actually lose weight.\par \par The initial approach to weight loss usually involves eating fewer calories and exercising regularly. There are other strategies that may be appropriate for people who are having trouble losing weight, including medications and surgery. More information about losing weight is available separately. (See "Patient education: Losing weight (Beyond the Basics)".)\par \par Recommended calorie intake — The number of calories you need to maintain your current weight depends upon your age, sex, height, weight, and activity level. Below are some general guidelines:\par \par ?Men, active women – 15 calories per pound\par \par ?Most women, sedentary men, and adults over 55 years – 13 calories per pound\par \par ?Sedentary women, obese adults – 10 calories per pound\par \par ?Pregnant, lactating women – 15 to 17 calories per pound\par \par \par To lose 1 to 2 pounds per week (which is considered a safe rate of weight loss), you can subtract 500 to 1000 calories from the total number of calories needed to maintain weight.\par \par As an example, an obese, sedentary man who weighs 250 pounds would need to eat 10 calories per pound, which totals 2500 calories, per day to maintain his weight. To lose 1 to 2 pounds per week, he should reduce his intake to 1500 to 2000 calories per day. As you lose weight, you will need to adjust your daily recommended calorie intake accordingly.\par \par Avoiding weight gain — Some treatments for type 2 diabetes, such as intensive insulin therapy and certain oral medications, can contribute to weight gain. The following tips can help you avoid unwanted weight gain:\par \par ?Weigh yourself on a regular basis (eg, every morning). If you gain more than 2 to 3 pounds in a week, try decreasing the number of calories you eat or increasing the amount of physical activity you do. Do not wait until you have gained a larger amount of weight, as this will make it harder to lose.\par \par \par ?As your blood sugar control improves with treatment, it may be necessary to somewhat decrease your daily calorie intake to avoid weight gain.\par \par \par ?If your blood sugar levels are frequently low at a particular time of day, your provider might suggest decreasing your dose of insulin or other medication rather than adding a snack at that time.\par \par \par Exercise — Exercising regularly can help to lose weight and keep it off. Exercise has other health benefits, too. The recommended amount of exercise for most people is 30 minutes per day most days of the week. (See "Patient education: Exercise (Beyond the Basics)".)\par \par If you take insulin or oral medications that lower blood sugar levels, you will need check your blood sugar level before and after exercising. If you are doing a new exercise routine that is vigorous and prolonged (more than 30 minutes), and you expect to do the same routine again in the future, check your blood sugar every 15 minutes. This can help give you a sense of how exercise affects your blood sugar level.\par \par If your blood sugar level becomes low during exercise, eat a snack according to the guidelines below. (See "Patient education: Hypoglycemia (low blood sugar) in diabetes mellitus (Beyond the Basics)".)\par \par ?If the blood glucose is 51 to 70 mg/dL (2.8 to 3.8 mmol/L), eat 10 to 15 grams of fast-acting carbohydrate (eg, 1/2 cup fruit juice, six to eight hard candies, three to four glucose tablets).\par \par \par ?If the level is less than 50 mg/dL (2.7 mmol/L), eat 20 to 30 grams of fast-acting carbohydrates.\par \par \par ?If you are not able to check your blood sugar but you have symptoms of hypoglycemia (eg, sweating and shaking), eat 10 to 15 grams of fast-acting carbohydrate.\par \par \par Retest after 15 minutes and repeat treatment if blood glucose is still too low. If your next meal is more than an hour away, eat an additional 15 grams of carbohydrate and 1 ounce of protein (for example, crackers with cheese or one-half of a sandwich with peanut butter). It is important not to eat too much, because this can raise your blood sugar level too much and lead to weight gain over the long term.\par \par Adjusting insulin dose for exercise — If you take oral diabetes medications, you probably will not need to adjust the dose of these medications for exercise.\par \par If you take insulin, it may be possible to reduce your insulin dose before exercising to avoid developing low blood glucose. A physician, diabetes educator, dietitian, or exercise physiologist can help to determine the best way to adjust your insulin dose before, during, and after exercising. (See "Patient education: Type 2 diabetes: Insulin treatment (Beyond the Basics)".)\par \par \par TYPE 2 DIABETES AND ALCOHOL\par Drinking a moderate amount of alcohol (up to one serving per day for women or two servings per day for men) with food does not affect blood glucose levels significantly. Alcohol may cause a slight rise in blood glucose, followed hours later by a decrease in the blood glucose level. As a result, it is important to monitor blood glucose response to alcohol to determine if any changes in insulin doses are needed.\par \par Mixers, such as fruit juice or regular cola, can increase blood glucose levels and increase the number of calories consumed in a day. Also, calories from alcohol have little nutritional value and may contribute to weight gain (or make it harder to lose weight). If you take oral diabetes medications, you will not need to adjust your medication, as long as you drink the alcohol in moderation and with food.\par \par \par TYPE 2 DIABETES AND CARBOHYDRATE CONSISTENCY\par Carbohydrates are the main energy source in the diet and include starches, vegetables, fruits, dairy products, and sugars. Most meats and fats do not contain any carbohydrates.\par \par Carbohydrates directly affect your blood sugar level, whereas proteins and fat have little impact. Eating a consistent amount of carbohydrates at each meal can help to control your blood sugar levels, especially if you take certain oral diabetes medications or long-acting insulin.\par \par There are different ways to make sure you eat a consistent amount of carbohydrates throughout the day, including carbohydrate counting and exchange planning.\par \par Carbohydrate counting — A dietitian can help you figure out the number of carbohydrates you need each day based on your eating habits, weight, nutritional goals, and activity level. The way carbohydrates are divided up for each meal or snack will depend on your personal preferences, the gena\par 2  facial lesion   refer to derm for removal \par 3  psoriasis fu with   rheum  continue medications \par 4.hld to lower  LDL and non HDL  cholesterol levels     1 limit your intake of foods full of saturated fats  , trans fats, and dietary cholesterol .  Food with a lot of saturated fate include butter, fatty flesh like red meat, full fat and low fat dairy  products  , palm oil and coconut oil .   If you see partially hydrogenated fat in the ingredient  list of food label that food has trans fats.  Top sources of dietary chol include egg yolks , organ meats and shell fish.  one Type of fat omega 3 Fatty acids has been shown to protect against heart disease  . Good sources are cold water fish like salmon, mackerel , halibut ., trout  herring and sardines.     Limit  your intake of meat , poultry and fish to no more than 3.5  ounces per day     Eat a lot more fiber rich foods  like beans , oats, barley fruits and vegetables   .  Food naturally rich in soluble fiber  are good at lowering cholesterol .    Excellent choices  are  oats , oat bran , barley , peas , yams sweet potatoes and legumes  or beans .  Good fruit sources are berries passion fruit, oranges pears,, apricots , apples  and nectarines  .    choose protein rich plant foods   such as legumes or beans nuts and seeds over meat.     Lose as much weight as possible and exercise   Take plant sterol supplements   .A Daily intake  of 1-2 gms  of plant sterols  lowers ldl .    Best choice is supplements  such as Cholestoff  by [employer]   because they don’t have calories  sugar trans fats   an salt  of many foods enriched with plant sterols.    Take  Metamucil or psyllium   .   Studies have shown 9-10 gms as daily of psyllium   the equivalent of  about  3 teaspoons  of sugar free Metamucil   reduced LDL levels  .\par 5 fatty liver  Fatty Liver: The patient denies any jaundice or pruritus. The patient denies any alcohol use. The patient denies taking large doses of nonsteroidal anti-inflammatory drugs or acetaminophen. The findings are suggestive of fatty liver. The patient and I had a long discussion regarding the risks of fatty liver progressing to cirrhosis. The patient was told of the possible increased risk of developing liver failure, cirrhosis, ascites, GI bleeding secondary to varices, hepatic encephalopathy, bleeding tendencies and liver cancer. The patient was told of the importance of follow-up. The patient was advised to follow up every 6 months for blood work and imaging studies. The patient agreed and will follow up. The patient was advised to lose weight. I recommend a trial of vitamin E supplementation for the fatty liver. If the liver enzymes remain elevated, the patient may require a trial of Pioglitazone for the fatty liver. I recommend avoid alcohol and hepato-toxic agents. The patient was also advised to avoid NSAIDs, Acetaminophen and any other hepatotoxic drugs. The patient was also advised not to share needles, razors, scissors, nail clippers, etc.. The patient is to continue close follow-up in our office for blood work and exams. If the liver enzymes remain elevated, the patient may require a CT guided liver biopsy to assess the liver parenchyma and for possible treatment. We had a long discussion regarding the risks and benefits of the procedure. The patient was told of the risks of bleeding, perforation, infections, emergency surgery and missing lesions. The patient agreed and will follow-up to reassess the symptoms Patient has been counseled on life style interventions, including but not limited to: weight loss (3-5% loss of body weight might improve fat in the liver, while 7-10% needed for potential improvement of other components, including inflammation and scarring of the liver, called fibrosis); healthy diet, avoiding added sugars, sodas, avoiding saturated fats, limiting sodium, avoiding alcohol; and on the importance of regular exercise (> 150 min/week moderate intensity aerobic exercise with at least 2x/week muscle strengthening or exercise as tolerated). \par - I explained to patient the natural Hx of NAFLD. \par \par 6.  hpn   low salt diet continue medications.\par 7  bmi 26  exercise

## 2023-03-02 NOTE — PHYSICAL EXAM
[Normal Sclera/Conjunctiva] : normal sclera/conjunctiva [PERRL] : pupils equal round and reactive to light [Normal Oropharynx] : the oropharynx was normal [No JVD] : no jugular venous distention [Supple] : supple [Normal Rate] : normal rate  [Regular Rhythm] : with a regular rhythm [Normal S1, S2] : normal S1 and S2 [No Edema] : there was no peripheral edema [No Extremity Clubbing/Cyanosis] : no extremity clubbing/cyanosis [Normal Posterior Cervical Nodes] : no posterior cervical lymphadenopathy [Normal Anterior Cervical Nodes] : no anterior cervical lymphadenopathy [No CVA Tenderness] : no CVA  tenderness [Normal] : no joint swelling and grossly normal strength and tone [Normal Scalp] : inspection of the scalp showed no abnormalities [Examination Of The Hair] : texture and distribution of hair was normal [Complexion Fair] : fair complexion [Skin Lesions 1] : Skin lesion: [Tattoo - Single] : no tattoos observed [Coordination Grossly Intact] : coordination grossly intact [No Focal Deficits] : no focal deficits [Normal Gait] : normal gait [Normal Affect] : the affect was normal [Normal Insight/Judgement] : insight and judgment were intact [de-identified] : callus  bilateral   severe arthritic changes noted

## 2023-03-03 LAB
25(OH)D3 SERPL-MCNC: 42.3 NG/ML
ALBUMIN SERPL ELPH-MCNC: 4.6 G/DL
ALP BLD-CCNC: 104 U/L
ALT SERPL-CCNC: 31 U/L
ANION GAP SERPL CALC-SCNC: 17 MMOL/L
APPEARANCE: CLEAR
AST SERPL-CCNC: 23 U/L
BASOPHILS # BLD AUTO: 0.05 K/UL
BASOPHILS NFR BLD AUTO: 0.5 %
BILIRUB SERPL-MCNC: 0.4 MG/DL
BILIRUBIN URINE: NEGATIVE
BLOOD URINE: NEGATIVE
BUN SERPL-MCNC: 18 MG/DL
CALCIUM SERPL-MCNC: 9.5 MG/DL
CHLORIDE SERPL-SCNC: 103 MMOL/L
CHOLEST SERPL-MCNC: 143 MG/DL
CO2 SERPL-SCNC: 22 MMOL/L
COLOR: YELLOW
CREAT SERPL-MCNC: 1.2 MG/DL
EGFR: 63 ML/MIN/1.73M2
EOSINOPHIL # BLD AUTO: 0.09 K/UL
EOSINOPHIL NFR BLD AUTO: 0.9 %
ESTIMATED AVERAGE GLUCOSE: 180 MG/DL
FOLATE SERPL-MCNC: >20 NG/ML
FRUCTOSAMINE SERPL-MCNC: 285 UMOL/L
GLUCOSE QUALITATIVE U: NEGATIVE
GLUCOSE SERPL-MCNC: 150 MG/DL
HBA1C MFR BLD HPLC: 7.9 %
HCT VFR BLD CALC: 45.4 %
HDLC SERPL-MCNC: 50 MG/DL
HGB BLD-MCNC: 14.5 G/DL
IMM GRANULOCYTES NFR BLD AUTO: 0.4 %
KETONES URINE: NEGATIVE
LDLC SERPL CALC-MCNC: 51 MG/DL
LEUKOCYTE ESTERASE URINE: NEGATIVE
LYMPHOCYTES # BLD AUTO: 1.73 K/UL
LYMPHOCYTES NFR BLD AUTO: 16.8 %
MAN DIFF?: NORMAL
MCHC RBC-ENTMCNC: 30.3 PG
MCHC RBC-ENTMCNC: 31.9 GM/DL
MCV RBC AUTO: 94.8 FL
MONOCYTES # BLD AUTO: 0.78 K/UL
MONOCYTES NFR BLD AUTO: 7.6 %
NEUTROPHILS # BLD AUTO: 7.6 K/UL
NEUTROPHILS NFR BLD AUTO: 73.8 %
NITRITE URINE: NEGATIVE
NONHDLC SERPL-MCNC: 93 MG/DL
PH URINE: 6
PLATELET # BLD AUTO: 256 K/UL
POTASSIUM SERPL-SCNC: 4.4 MMOL/L
PROT SERPL-MCNC: 6.9 G/DL
PROTEIN URINE: NORMAL
PSA SERPL-MCNC: 2.94 NG/ML
RBC # BLD: 4.79 M/UL
RBC # FLD: 13.9 %
SODIUM SERPL-SCNC: 142 MMOL/L
SPECIFIC GRAVITY URINE: 1.03
TRIGL SERPL-MCNC: 214 MG/DL
UROBILINOGEN URINE: NORMAL
VIT B12 SERPL-MCNC: 783 PG/ML
WBC # FLD AUTO: 10.29 K/UL

## 2023-03-21 ENCOUNTER — RX RENEWAL (OUTPATIENT)
Age: 73
End: 2023-03-21

## 2023-05-09 ENCOUNTER — RX RENEWAL (OUTPATIENT)
Age: 73
End: 2023-05-09

## 2023-06-20 ENCOUNTER — APPOINTMENT (OUTPATIENT)
Dept: INTERNAL MEDICINE | Facility: CLINIC | Age: 73
End: 2023-06-20
Payer: MEDICARE

## 2023-06-20 VITALS
TEMPERATURE: 97.7 F | HEART RATE: 67 BPM | BODY MASS INDEX: 26.18 KG/M2 | OXYGEN SATURATION: 97 % | HEIGHT: 71 IN | DIASTOLIC BLOOD PRESSURE: 71 MMHG | SYSTOLIC BLOOD PRESSURE: 110 MMHG | WEIGHT: 187 LBS

## 2023-06-20 DIAGNOSIS — Z00.00 ENCOUNTER FOR GENERAL ADULT MEDICAL EXAMINATION W/OUT ABNORMAL FINDINGS: ICD-10-CM

## 2023-06-20 DIAGNOSIS — I45.10 UNSPECIFIED RIGHT BUNDLE-BRANCH BLOCK: ICD-10-CM

## 2023-06-20 DIAGNOSIS — M54.50 LOW BACK PAIN, UNSPECIFIED: ICD-10-CM

## 2023-06-20 PROCEDURE — 93000 ELECTROCARDIOGRAM COMPLETE: CPT

## 2023-06-20 PROCEDURE — G0439: CPT

## 2023-06-20 RX ORDER — RISANKIZUMAB-RZAA 150 MG/ML
150 INJECTION SUBCUTANEOUS
Qty: 1 | Refills: 0 | Status: DISCONTINUED | COMMUNITY
Start: 2022-11-17 | End: 2023-06-20

## 2023-06-20 NOTE — ASSESSMENT
[FreeTextEntry1] : 1 health  he is up to date with immunizations,  psa,   bone density,   he has not gone  for cologuard  testing  and  up to date with eye and dental.  \par 2  bmi 26  Weight loss, exercise, and diet control were discussed and are highly encouraged. Treatment options were given such as, aqua therapy, and contacting a nutritionist. Recommended to use the elliptical, stationary bike, less use of treadmill. Mindful eating was explained to the patient Obesity is associated with worsening asthma, shortness of breath, and potential for cardiac disease, diabetes, and other underlying medical conditions.\par 3  dm  ---The following has been discussed:---\par -Targets for weight and HgA1c have been discussed with patient \par -FS goals have been reviewed with the patient in detail:\par AM <130 post meal<160-180\par -Diet and weight goals have been discussed with the patient in detail.\par -The importance of exercise in the treatment of diabetes has been discussed \par with the patient in detail.\par -Extensive dietary advice provided to patient and the need to avoid concentrated \par sweets/simple carbohydrates and to ensure to consume protein with each meal. \par -Patient instructed to limit carbohydrates to 60 gms per meal and 15 gms per \par snacks. \par -Patient to keep a blood sugar log to check fasting and before meals\par -Patient instructed on importance of daily feet inspection and to reports any \par open lesions to physician promptly\par \par  If you are overweight or obese, losing weight can improve blood sugar control and lower your blood pressure and cholesterol levels.\par \par Losing weight — Your health care provider can help you set goals for losing weight. For a person who is overweight or obese, a typical goal is to lose 5 to 10 percent of their body weight. For a person who weighs 240 pounds, this would mean losing 12 to 24 pounds. Losing even more weight can sometimes reduce the blood sugars to the normal range. But even losing a little bit of weight can help improve your health; in fact, cutting back on the number of calories you eat each day can lower your blood sugar levels even before you actually lose weight.\par \par The initial approach to weight loss usually involves eating fewer calories and exercising regularly. There are other strategies that may be appropriate for people who are having trouble losing weight, including medications and surgery. More information about losing weight is available separately. (See "Patient education: Losing weight (Beyond the Basics)".)\par \par Recommended calorie intake — The number of calories you need to maintain your current weight depends upon your age, sex, height, weight, and activity level. Below are some general guidelines:\par \par ?Men, active women – 15 calories per pound\par \par ?Most women, sedentary men, and adults over 55 years – 13 calories per pound\par \par ?Sedentary women, obese adults – 10 calories per pound\par \par ?Pregnant, lactating women – 15 to 17 calories per pound\par \par \par To lose 1 to 2 pounds per week (which is considered a safe rate of weight loss), you can subtract 500 to 1000 calories from the total number of calories needed to maintain weight.\par \par As an example, an obese, sedentary man who weighs 250 pounds would need to eat 10 calories per pound, which totals 2500 calories, per day to maintain his weight. To lose 1 to 2 pounds per week, he should reduce his intake to 1500 to 2000 calories per day. As you lose weight, you will need to adjust your daily recommended calorie intake accordingly.\par \par Avoiding weight gain — Some treatments for type 2 diabetes, such as intensive insulin therapy and certain oral medications, can contribute to weight gain. The following tips can help you avoid unwanted weight gain:\par \par ?Weigh yourself on a regular basis (eg, every morning). If you gain more than 2 to 3 pounds in a week, try decreasing the number of calories you eat or increasing the amount of physical activity you do. Do not wait until you have gained a larger amount of weight, as this will make it harder to lose.\par \par \par ?As your blood sugar control improves with treatment, it may be necessary to somewhat decrease your daily calorie intake to avoid weight gain.\par \par \par ?If your blood sugar levels are frequently low at a particular time of day, your provider might suggest decreasing your dose of insulin or other medication rather than adding a snack at that time.\par \par \par Exercise — Exercising regularly can help to lose weight and keep it off. Exercise has other health benefits, too. The recommended amount of exercise for most people is 30 minutes per day most days of the week. (See "Patient education: Exercise (Beyond the Basics)".)\par \par If you take insulin or oral medications that lower blood sugar levels, you will need check your blood sugar level before and after exercising. If you are doing a new exercise routine that is vigorous and prolonged (more than 30 minutes), and you expect to do the same routine again in the future, check your blood sugar every 15 minutes. This can help give you a sense of how exercise affects your blood sugar level.\par \par If your blood sugar level becomes low during exercise, eat a snack according to the guidelines below. (See "Patient education: Hypoglycemia (low blood sugar) in diabetes mellitus (Beyond the Basics)".)\par \par ?If the blood glucose is 51 to 70 mg/dL (2.8 to 3.8 mmol/L), eat 10 to 15 grams of fast-acting carbohydrate (eg, 1/2 cup fruit juice, six to eight hard candies, three to four glucose tablets).\par \par \par ?If the level is less than 50 mg/dL (2.7 mmol/L), eat 20 to 30 grams of fast-acting carbohydrates.\par \par \par ?If you are not able to check your blood sugar but you have symptoms of hypoglycemia (eg, sweating and shaking), eat 10 to 15 grams of fast-acting carbohydrate.\par \par \par Retest after 15 minutes and repeat treatment if blood glucose is still too low. If your next meal is more than an hour away, eat an additional 15 grams of carbohydrate and 1 ounce of protein (for example, crackers with cheese or one-half of a sandwich with peanut butter). It is important not to eat too much, because this can raise your blood sugar level too much and lead to weight gain over the long term.\par \par Adjusting insulin dose for exercise — If you take oral diabetes medications, you probably will not need to adjust the dose of these medications for exercise.\par \par If you take insulin, it may be possible to reduce your insulin dose before exercising to avoid developing low blood glucose. A physician, diabetes educator, dietitian, or exercise physiologist can help to determine the best way to adjust your insulin dose before, during, and after exercising. (See "Patient education: Type 2 diabetes: Insulin treatment (Beyond the Basics)".)\par \par \par TYPE 2 DIABETES AND ALCOHOL\par Drinking a moderate amount of alcohol (up to one serving per day for women or two servings per day for men) with food does not affect blood glucose levels significantly. Alcohol may cause a slight rise in blood glucose, followed hours later by a decrease in the blood glucose level. As a result, it is important to monitor blood glucose response to alcohol to determine if any changes in insulin doses are needed.\par \par Mixers, such as fruit juice or regular cola, can increase blood glucose levels and increase the number of calories consumed in a day. Also, calories from alcohol have little nutritional value and may contribute to weight gain (or make it harder to lose weight). If you take oral diabetes medications, you will not need to adjust your medication, as long as you drink the alcohol in moderation and with food.\par \par \par TYPE 2 DIABETES AND CARBOHYDRATE CONSISTENCY\par Carbohydrates are the main energy source in the diet and include starches, vegetables, fruits, dairy products, and sugars. Most meats and fats do not contain any carbohydrates.\par \par Carbohydrates directly affect your blood sugar level, whereas proteins and fat have little impact. Eating a consistent amount of carbohydrates at each meal can help to control your blood sugar levels, especially if you take certain oral diabetes medications or long-acting insulin.\par \par There are different ways to make sure you eat a consistent amount of carbohydrates throughout the day, including carbohydrate counting and exchange planning.\par \par Carbohydrate counting — A dietitian can help you figure out the number of carbohydrates you need each day based on your eating habits, weight, nutritional goals, and activity level. The way carbohydrates are divided up for each meal or snack will depend on your personal preferences, the gena\par 4  hld  to lower  LDL and non HDL  cholesterol levels     1 limit your intake of foods full of saturated fats  , trans fats, and dietary cholesterol .  Food with a lot of saturated fate include butter, fatty flesh like red meat, full fat and low fat dairy  products  , palm oil and coconut oil .   If you see partially hydrogenated fat in the ingredient  list of food label that food has trans fats.  Top sources of dietary chol include egg yolks , organ meats and shell fish.  one Type of fat omega 3 Fatty acids has been shown to protect against heart disease  . Good sources are cold water fish like salmon, mackerel , halibut ., trout  herring and sardines.     Limit  your intake of meat , poultry and fish to no more than 3.5  ounces per day     Eat a lot more fiber rich foods  like beans , oats, barley fruits and vegetables   .  Food naturally rich in soluble fiber  are good at lowering cholesterol .    Excellent choices  are  oats , oat bran , barley , peas , yams sweet potatoes and legumes  or beans .  Good fruit sources are berries passion fruit, oranges pears,, apricots , apples  and nectarines  .    choose protein rich plant foods   such as legumes or beans nuts and seeds over meat.     Lose as much weight as possible and exercise   Take plant sterol supplements   .A Daily intake  of 1-2 gms  of plant sterols  lowers ldl .    Best choice is supplements  such as Cholestoff  by natures made   because they don’t have calories  sugar trans fats   an salt  of many foods enriched with plant sterols.    Take  Metamucil or psyllium   .   Studies have shown 9-10 gms as daily of psyllium   the equivalent of  about  3 teaspoons  of sugar free Metamucil   reduced LDL levels  .\par 5 fatty liver I discussed with him stopping drinking     Fatty Liver: The patient denies any jaundice or pruritus. The patient denies any alcohol use. The patient denies taking large doses of nonsteroidal anti-inflammatory drugs or acetaminophen. The findings are suggestive of fatty liver. The patient and I had a long discussion regarding the risks of fatty liver progressing to cirrhosis. The patient was told of the possible increased risk of developing liver failure, cirrhosis, ascites, GI bleeding secondary to varices, hepatic encephalopathy, bleeding tendencies and liver cancer. The patient was told of the importance of follow-up. The patient was advised to follow up every 6 months for blood work and imaging studies. The patient agreed and will follow up. The patient was advised to lose weight. I recommend a trial of vitamin E supplementation for the fatty liver. If the liver enzymes remain elevated, the patient may require a trial of Pioglitazone for the fatty liver. I recommend avoid alcohol and hepato-toxic agents. The patient was also advised to avoid NSAIDs, Acetaminophen and any other hepatotoxic drugs. The patient was also advised not to share needles, razors, scissors, nail clippers, etc.. The patient is to continue close follow-up in our office for blood work and exams. If the liver enzymes remain elevated, the patient may require a CT guided liver biopsy to assess the liver parenchyma and for possible treatment. We had a long discussion regarding the risks and benefits of the procedure. The patient was told of the risks of bleeding, perforation, infections, emergency surgery and missing lesions. The patient agreed and will follow-up to reassess the symptoms Patient has been counseled on life style interventions, including but not limited to: weight loss (3-5% loss of body weight might improve fat in the liver, while 7-10% needed for potential improvement of other components, including inflammation and scarring of the liver, called fibrosis); healthy diet, avoiding added sugars, sodas, avoiding saturated fats, limiting sodium, avoiding alcohol; and on the importance of regular exercise (> 150 min/week moderate intensity aerobic exercise with at least 2x/week muscle strengthening or exercise as tolerated). \par - I explained to patient the natural Hx of NAFLD. \par \par 6.  hpn.   \par DIETARY SALT (SODIUM); DASH DIET AND BLOOD PRESSURE:\par To decrease the sodium in your diet: \par · Use fresh vegetables and foods as much as possible.\par · Avoid canned and processed foods. Cured meats such as andrews, ham, and sausages are high in salt.\par · Try using different herbs and spices in your cooking instead of salt.\par · In restaurants, avoid foods with sauces, cheese, and cured meats. Ask for low-sodium choices.\par To get more potassium in your diet, eat:\par · Bananas, fresh or dried apricots, peaches, citrus fruits, melons\par · Cauliflower, broccoli, tomatoes, carrots, raw spinach, beet greens, potatoes\par To get more magnesium in your diet, eat:\par · Whole grain foods, leafy green vegetables, dried fruits\par • Fish and seafood, poultry \par To get more calcium in your diet, eat:\par · Nonfat milk, yogurt, and low-fat cheeses \par · Flasher and sardines\par · Cooked dried beans\par · Broccoli, kale, and bok tosha\par · Tofu or soybean curd\par DASH stands for "dietary approaches to stop hypertension." The DASH diet is low in total and saturated fat. It is rich in fruits, vegetables, and low-fat dairy foods. The diet allows you to get natural fiber, calcium, and magnesium from food. It prevents or lowers high blood pressure. It can also help lower cholesterol in your blood. \par Don't change how you eat all at once. It's much more likely that you'll succeed if you make only one or two small changes at a time. Wait until those changes are a habit, then make a couple more changes. Some good starting steps include: \par Add one serving of vegetables to your meals at lunch and dinner. This is an easy way to help you get more vegetables in your diet. \par Have a piece of fruit as an afternoon or after-dinner snack. One glass of juice at breakfast is not enough fruit in your diet. \par Use half your usual amount of butter, margarine, or salad dressing. \par Buy nonfat salad dressing or nonfat sour cream.\par Follow this guide to select your menu of meals. The number of calories we want you to eat each day will tell you how many servings you can choose from each food group.\par Calories: 1600 2100 2600 3100 Servings Grains 6 7 ½ 10 ½ 12 ½ Vegetables 	 4 4 ½ 5 6 Fruit 4 5 5 6 Dairy (low-fat) 2 ½ 3 3 3 ½ Meat, poultry, fish ½ 1 ½ 2 2 ½ Nuts, seeds ½ ½ ½ 1 Fats and sweets 1 ½ 2 ½ 3 4\par Grains and grain products like breads and cereals provide energy, fiber and vitamins. Whole grains have more of these nutrients. One serving equals one of the following:\par Bagel, 1/2 medium; Barley, cooked 1/2 cup; Biscuit, country style 1 medium; Bread, whole wheat, white 1 slice; Cereals, cold or cooked, 1/2 cup; Cornbread, 1 medium piece; Crackers, delma, 2; Crackers, saltine, 4; Dinner roll, 1medium; English muffin, ½; Hamburger bun, ½; Muffin, 1 medium; Pancake, 1 medium; Pasta, 1/2 cup cooked; [place], 1/2 large or 1 small; Popcorn, 1 cup popped; Pretzels, 1 ounce; Rice, white, brown, or wild, 1/2 cup cooked; Tortillas, corn or flour, 1 medium; Waffle, 1 medium; Wheat germ, 1/4 cup; \par Vegetables are rich sources of potassium, magnesium, and fiber. One serving is 1/2 cup of any of the following cooked vegetables:\par Asparagus, Beans (green, yellow), Beets, Broccoli, [place] Sprouts, Carrots, Cauliflower, [place], chicory, mustard and turnip (and other) greens, Corn, Kale, Lima beans, Mixed vegetables, Okra, Parsnips, Peas, green, Potatoes (1/2 medium or 1/2 cup mashed), Pumpkin, Rutabaga, Spaghetti or tomato sauce, Spinach, Squash (zucchini or yellow), Stewed tomatoes, Succotash, Sweet potatoes, Turnips, Yam \par Raw vegetables: Carrots,1/2 cup; Celery, 1/2 cup; Lettuce (clif, loose-leaf, green-leaf), 1 cup; Peppers, 1/2 cup; Spinach, 1 cup; Tomato, 1/2\par Fruits and fruit juices are important sources of potassium and magnesium. Fruits also contain fiber and are low in sodium and fat. One serving equals:\par Any fruit juice, # cup (6 ounces); Canned or frozen fruit, ½ cup (includes applesauce); Dried fruit, ¼ cup; \par Fresh fruit:\par Apple, 1 medium; Apricots, 2 medium; Banana, 1 medium; Berries, 1/2 cup; Melon, 1 wedge, or 1/2 cup; Cherries, 10; Grapefruit, 1/2; Grapes, 15; Kiwi, 1 medium; [place], 1/2 small; Nectarine, 1 medium; Orange, 1 medium; Peach, 1 medium; Pear, 1 medium; Pineapple, 1/2 cup; Plums, 2 medium; Tangerine, 1 large\par Dairy foods provide protein and calcium. Use low-fat or nonfat dairy products to cut down on fat. One serving equals:\par Skim milk, 1 cup (8 ounces); 1% low fat milk, 1 cup (8 ounces); 2% low fat milk, 1 cup (8 ounces) nonfat dry milk powder (1/3 cup); Low-fat cottage cheese, 1 cup (8 ounces); Parmesan cheese, 1 tablespoon; Mozzarella cheese, part skim, 1/4 cup (1 ounce); Low-fat cheddar cheese, 11/2 ounces; Ricotta cheese, part skim milk or nonfat, 1/4 cup (11/2 ounces); Other low fat or nonfat cheeses (11/2 oz.); Low-fat or nonfat yogurt, fruit-flavored or plain, 1 cup (8 ounces)\par Low-fat or nonfat frozen yogurt, 1/2 cup (4 ounces); Note: People who can't digest dairy products can try taking lactase enzyme pills or drops (available at drug and grocery stores) when they eat dairy. There is also milk available with the enzyme already added. Or you can buy lactose-free milk.\par Meat, poultry, and fish are good sources of protein and magnesium. One serving equals:\par Lean meat including beef, veal, or pork, 3 ounces cooked; Skinless, white meat poultry including turkey, chicken, 3 ounces; Fish and shellfish, 3 ounces cooked; Low-fat luncheon meats, 1 ounce; Egg, 1 medium; Tofu, 3 ounces\par Note: Three ounces of cooked meat is about the size of a deck of cards.\par Nuts, seeds, and legumes are rich sources of energy, magnesium, potassium, protein and fiber. Nuts and seeds are also high in fat, so portions should be small.\par Almonds, 1/3 cup; Beans such as kidney, olivares, and navy, 1/2 cup cooked; Chickpeas and lentils, 1/2 cup cooked; Cashews, 1/3 cup; Filberts, 1/3 cup; Mixed nuts, 1/3 cup; Peanut butter, 2 tablespoons; Peanuts, 1/3 cup; Sesame seeds, 2 tablespoons; Sunflower seeds, 2 tablespoons; Tofu, regular, 3 ounces; Walnuts, 1/3 cup \par Following the above diet will give you about 27% fat in your diet. The goal is to have 30% or less of the calories you eat each day be from fat. To meet that goal, do not eat more than 2-3 servings daily of added fat. Also try to limit sweets. One serving equals:\par Butter or margarine, 1 teaspoon; Mayonnaise, 1 teaspoon; Low-fat mayonnaise, 1 tablespoon; Salad dressing, 1 tablespoon; Low-fat salad dressing, 2 tablespoons; Oil, 1 teaspoon (use olive, canola, safflower, or other vegetable oils); Candy, hard, 3 pieces; Jelly or jam, 1 tablespoon); Jell-O, 1/2 cup; Jelly beans, 1/2 ounce; Maple syrup, 1 tablespoon; Popsicle, 1; Sherbet or nonfat or low-fat frozen yogurt, 1/2 cup; Sugar, 1 tablespoon; Sugared lemonade or fruit punch, 1 cup (8 ounces); Note: Try diet fruit-flavored gelatin or frozen, canned, or fresh fruit for dessert.\par \par Small amounts of alcohol may have benefits to the heart and blood pressure. However, excess use of alcohol can cause damage to the brain, liver and other organs. It can lead to high blood pressure. Drinking more than two drinks (15 ml) every day can raise your blood pressure. 15 ml of alcohol equals: \par • one 12-ounce bottle of beer \par • a half glass (5 ounces) of wine \par • 1 ounce (one shot) of 100 proof hard liquor\par 7  ra  sees rheumatologist  \par 8  psoriasis    fu with rheum and will be starting a new meds \par 9  rbbb  no change of ekg   rate 73    rbbb  qrs  146ms  qt  420/ 462ms pr  162ms p  118 \par \par

## 2023-06-20 NOTE — PHYSICAL EXAM
[Well Developed] : well developed [Well Nourished] : well nourished [Conjunctiva] : the conjunctiva were normal in both eyes [PERRL] : pupils were equal in size, round, and reactive to light [EOM Intact] : extraocular movements were intact [Cataract Right Eye] : a cataract was noted in the right eye [Cataract Left Eye] : a cataract was noted in the left eye [Normal Appearance] : was normal in appearance [Neck Supple] : was supple [Enlarged Diffusely] : was not enlarged [Rate ___] : at [unfilled] breaths per minute [Normal Rhythm/Effort] : normal respiratory rhythm and effort [Clear Bilaterally] : the lungs were clear to auscultation bilaterally [Normal to Percussion] : the lungs were normal to percussion [5th Left ICS - MCL] : palpated at the 5th LICS in the midclavicular line [Heart Rate ___] : [unfilled] bpm [Rhythm Regular] : regular [Normal Rate] : normal [Normal S1] : normal S1 [Normal S2] : normal S2 [S3] : no S3 [S4] : no S4 [No Murmur] : no murmurs heard [No Pitting Edema] : no pitting edema present [Rt] : varicose veins of the right leg noted [Lt] : varicose veins of the left leg noted [Right Carotid Bruit] : no bruit heard over the right carotid [Left Carotid Bruit] : no bruit heard over the left carotid [Right Femoral Bruit] : no bruit heard over the right femoral artery [Left Femoral Bruit] : no bruit heard over the left femoral artery [2+] : left 2+ [No Abnormalities] : the abdominal aorta was not enlarged and no bruit was heard [Bruit] : no bruit heard [Examination Of The Breasts] : a normal appearance [No Discharge] : no discharge [Soft, Nontender] : the abdomen was soft and nontender [No Mass] : no masses were palpated [No HSM] : no hepatosplenomegaly noted [Abdomen Hernia Ventral] : no ventral hernia [Abdomen Hernia Umbilical] : an umbilical hernia was present [] : which was reducible [Inguinal Hernia Right] : no inguinal hernia on the right [Inguinal Hernia Left] : no inguinal hernia on the left [None] : no CVA tenderness [Normal rectal exam] : was normal [Occult Blood Positive] : was negative for occult blood [Gross Blood] : no gross blood [Stool Sample Taken] : a stool sample was obtained [Fecal Impaction] : no fecal impaction was present [Urethral Meatus] : meatus normal [Penis Abnormality] : normal circumcised penis [Testes Tenderness] : no tenderness of the testes [Anus Abnormality] : the anus and perineum were normal [Prostate Enlargement] : the prostate was not enlarged [Postauricular Lymph Nodes Enlarged Bilaterally] : nodes not enlarged [Preauricular Lymph Nodes Enlarged Bilaterally] : nodes not enlarged [Submandibular Lymph Nodes Enlarged Bilaterally] : nodes not enlarged [Suboccipital Lymph Nodes Enlarged Bilaterally] : nodes not enlarged [Submental Lymph Nodes Enlarged] : nodes not enlarged [Cervical Lymph Nodes Enlarged Posterior Bilaterally] : nodes not enlarged [Cervical Lymph Nodes Enlarged Anterior Bilaterally] : nodes not enlarged [Supraclavicular Lymph Nodes Enlarged Bilaterally] : nodes not enlarged [Axillary Lymph Nodes Enlarged Bilaterally] : nodes not enlarged [Epitrochlear Lymph Nodes Enlarged Bilaterally] : nodes not enlarged [Femoral Lymph Nodes Enlarged Bilaterally] : nodes not enlarged [Inguinal Lymph Nodes Enlarged Bilaterally] : nodes not enlarged [No Lymphangitis] : no lymphangitis observed [No Visual Abnormalities] : no visible abnormalities [Normal Lordosis] : normal lordosis [No Scoliosis] : no scoliosis [No Tenderness to Palpation] : no spine tenderness on palpation [No Masses] : no masses [Full ROM] : full ROM [No Pain with ROM] : no pain with motion in any direction [Intact] : all reflexes within normal limits bilaterally [Normal Station and Gait] : the gait and station were normal [Normal Motor Tone] : the muscle tone was normal [Involuntary Movements] : no involuntary movements were seen [Normal Scalp] : inspection of the scalp showed no abnormalities [Examination Of The Hair] : texture and distribution of hair was normal [Complexion Fair] : fair complexion [Skin Lesions 1] : Skin lesion: [Tattoo - Single] : no tattoos observed [Normal] : the deep tendon reflexes were normal [Normal Mental Status] : the patient's orientation, memory, attention, language and fund of knowledge were normal [Appropriate] : appropriate [Impaired Insight] : intact insight [Impaired judgment] : intact judgment [Comprehensive Foot Exam Normal] : Right and left foot were examined and both feet are normal. No ulcers in either foot. Toes are normal and with full ROM.  Normal tactile sensation with monofilament testing throughout both feet [de-identified] : tongue normal  teeth in fair repair  ,  deviated nasal septum top right

## 2023-06-20 NOTE — HISTORY OF PRESENT ILLNESS
[FreeTextEntry1] : cpe   [de-identified] : Pt is a  72 yr old man   DM type 2, pituitary adenoma, hypertension, psoriasis and arthropathy who is here for his cpe.  He had  bx and removal of  facial lesion and were b9.    He has  joint pain and the rheumatologist is going to  have injections  . He has been taking meloxicam.    He has  normal bone density  and us of  abd    showed  liver steatosis  renal cysts    He -denies any headaches, nausea, vomiting, fever, chills, sweats, chest pain, chest pressure, diarrhea, constipation, dysphagia, sour taste in the mouth, dizziness, leg swelling, leg pain, myalgias, arthralgias, itchy eyes, itchy ears, heartburn, or reflux.\par \par \par

## 2023-06-20 NOTE — COUNSELING
[Fall prevention counseling provided] : Fall prevention counseling provided [Adequate lighting] : Adequate lighting [No throw rugs] : No throw rugs [Use proper foot wear] : Use proper foot wear [Use recommended devices] : Use recommended devices [Sleep ___ hours/day] : Sleep [unfilled] hours/day [Engage in a relaxing activity] : Engage in a relaxing activity [Plan in advance] : Plan in advance [Potential consequences of obesity discussed] : Potential consequences of obesity discussed [Benefits of weight loss discussed] : Benefits of weight loss discussed [Structured Weight Management Program suggested:] : Structured weight management program suggested [Encouraged to maintain food diary] : Encouraged to maintain food diary [Encouraged to increase physical activity] : Encouraged to increase physical activity [Encouraged to use exercise tracking device] : Encouraged to use exercise tracking device [Weigh Self Weekly] : weigh self weekly [Decrease Portions] : decrease portions [____ min/wk Activity] : [unfilled] min/wk activity [Keep Food Diary] : keep food diary [FreeTextEntry2] : bmi  26

## 2023-06-20 NOTE — HEALTH RISK ASSESSMENT
[Very Good] : ~his/her~ current health as very good [Good] : ~his/her~  mood as  good [Yes] : Yes [4 or more  times a week (4 pts)] : 4 or more  times a week (4 points) [1 or 2 (0 pts)] : 1 or 2 (0 points) [No] : In the past 12 months have you used drugs other than those required for medical reasons? No [No falls in past year] : Patient reported no falls in the past year [0] : 2) Feeling down, depressed, or hopeless: Not at all (0) [PHQ-2 Negative - No further assessment needed] : PHQ-2 Negative - No further assessment needed [Patient reported bone density results were normal] : Patient reported bone density results were normal [Patient declined colonoscopy] : Patient declined colonoscopy [HIV test declined] : HIV test declined [Hepatitis C test offered] : Hepatitis C test offered [None] : None [With Significant Other] : lives with significant other [# of Members in Household ___] :  household currently consist of [unfilled] member(s) [Retired] : retired [High School] : high school [] :  [# Of Children ___] : has [unfilled] children [Sexually Active] : sexually active [Feels Safe at Home] : Feels safe at home [Fully functional (bathing, dressing, toileting, transferring, walking, feeding)] : Fully functional (bathing, dressing, toileting, transferring, walking, feeding) [Fully functional (using the telephone, shopping, preparing meals, housekeeping, doing laundry, using] : Fully functional and needs no help or supervision to perform IADLs (using the telephone, shopping, preparing meals, housekeeping, doing laundry, using transportation, managing medications and managing finances) [Smoke Detector] : smoke detector [Carbon Monoxide Detector] : carbon monoxide detector [Safety elements used in home] : safety elements used in home [Seat Belt] :  uses seat belt [Sunscreen] : uses sunscreen [Former] : Former [de-identified] : rheumatologist  , dermatologist   [Audit-CScore] : 4 [de-identified] : none   [de-identified] : reg [[id number]] : 0 [Change in mental status noted] : No change in mental status noted [Language] : denies difficulty with language [Behavior] : denies difficulty with behavior [Learning/Retaining New Information] : denies difficulty learning/retaining new information [Handling Complex Tasks] : denies difficulty handling complex tasks [Reasoning] : denies difficulty with reasoning [Spatial Ability and Orientation] : denies difficulty with spatial ability and orientation [Reports changes in hearing] : Reports no changes in hearing [Reports changes in vision] : Reports no changes in vision [Reports changes in dental health] : Reports no changes in dental health [Guns at Home] : no guns at home [Travel to Developing Areas] : does not  travel to developing areas [TB Exposure] : is not being exposed to tuberculosis [Caregiver Concerns] : does not have caregiver concerns [BoneDensityDate] : 3/20/23 [ColonoscopyDate] : 2013  [de-identified] : last eye exam  3/23  [de-identified] : last dental  yesterday  [AdvancecareDate] : 06/23

## 2023-06-21 LAB
ALBUMIN SERPL ELPH-MCNC: 4.4 G/DL
ALP BLD-CCNC: 110 U/L
ALT SERPL-CCNC: 12 U/L
ANION GAP SERPL CALC-SCNC: 14 MMOL/L
AST SERPL-CCNC: 15 U/L
BILIRUB SERPL-MCNC: 0.2 MG/DL
BUN SERPL-MCNC: 24 MG/DL
CALCIUM SERPL-MCNC: 9.3 MG/DL
CHLORIDE SERPL-SCNC: 106 MMOL/L
CHOLEST SERPL-MCNC: 133 MG/DL
CO2 SERPL-SCNC: 21 MMOL/L
CREAT SERPL-MCNC: 1.12 MG/DL
EGFR: 69 ML/MIN/1.73M2
ESTIMATED AVERAGE GLUCOSE: 189 MG/DL
FRUCTOSAMINE SERPL-MCNC: 268 UMOL/L
GLUCOSE SERPL-MCNC: 180 MG/DL
HBA1C MFR BLD HPLC: 8.2 %
HDLC SERPL-MCNC: 48 MG/DL
LDLC SERPL CALC-MCNC: 55 MG/DL
NONHDLC SERPL-MCNC: 85 MG/DL
POTASSIUM SERPL-SCNC: 4.7 MMOL/L
PROT SERPL-MCNC: 6.7 G/DL
SODIUM SERPL-SCNC: 140 MMOL/L
TRIGL SERPL-MCNC: 150 MG/DL

## 2023-07-26 ENCOUNTER — APPOINTMENT (OUTPATIENT)
Dept: HEPATOLOGY | Facility: CLINIC | Age: 73
End: 2023-07-26
Payer: MEDICARE

## 2023-07-26 DIAGNOSIS — R73.9 HYPERGLYCEMIA, UNSPECIFIED: ICD-10-CM

## 2023-07-26 DIAGNOSIS — E78.2 MIXED HYPERLIPIDEMIA: ICD-10-CM

## 2023-07-26 PROCEDURE — 76981 USE PARENCHYMA: CPT | Mod: 26

## 2023-11-13 ENCOUNTER — APPOINTMENT (OUTPATIENT)
Dept: INTERNAL MEDICINE | Facility: CLINIC | Age: 73
End: 2023-11-13
Payer: MEDICARE

## 2023-11-13 VITALS
BODY MASS INDEX: 25.62 KG/M2 | TEMPERATURE: 97.9 F | DIASTOLIC BLOOD PRESSURE: 79 MMHG | OXYGEN SATURATION: 98 % | HEART RATE: 90 BPM | HEIGHT: 71 IN | WEIGHT: 183 LBS | SYSTOLIC BLOOD PRESSURE: 142 MMHG

## 2023-11-13 DIAGNOSIS — D48.5 NEOPLASM OF UNCERTAIN BEHAVIOR OF SKIN: ICD-10-CM

## 2023-11-13 DIAGNOSIS — L72.3 SEBACEOUS CYST: ICD-10-CM

## 2023-11-13 PROCEDURE — 99205 OFFICE O/P NEW HI 60 MIN: CPT

## 2023-11-13 PROCEDURE — 99215 OFFICE O/P EST HI 40 MIN: CPT

## 2023-11-13 RX ORDER — SILDENAFIL 50 MG/1
50 TABLET ORAL
Qty: 10 | Refills: 3 | Status: COMPLETED | COMMUNITY
Start: 2020-03-02 | End: 2023-11-13

## 2023-11-13 RX ORDER — HYDROCORTISONE 1 %
12 CREAM (GRAM) TOPICAL
Qty: 400 | Refills: 0 | Status: COMPLETED | COMMUNITY
Start: 2022-11-18 | End: 2023-11-13

## 2023-11-13 RX ORDER — CLOBETASOL PROPIONATE 0.5 MG/G
0.05 OINTMENT TOPICAL
Qty: 60 | Refills: 0 | Status: COMPLETED | COMMUNITY
Start: 2022-10-21 | End: 2023-11-13

## 2023-11-13 RX ORDER — CICLOPIROX OLAMINE 7.7 MG/G
0.77 CREAM TOPICAL
Qty: 90 | Refills: 0 | Status: COMPLETED | COMMUNITY
Start: 2023-01-24 | End: 2023-11-13

## 2023-11-14 LAB
ALBUMIN SERPL ELPH-MCNC: 4.7 G/DL
ALP BLD-CCNC: 132 U/L
ALT SERPL-CCNC: 18 U/L
ANION GAP SERPL CALC-SCNC: 13 MMOL/L
AST SERPL-CCNC: 15 U/L
BILIRUB SERPL-MCNC: 0.4 MG/DL
BUN SERPL-MCNC: 18 MG/DL
CALCIUM SERPL-MCNC: 9.7 MG/DL
CHLORIDE SERPL-SCNC: 100 MMOL/L
CHOLEST SERPL-MCNC: 148 MG/DL
CO2 SERPL-SCNC: 26 MMOL/L
CREAT SERPL-MCNC: 1.11 MG/DL
EGFR: 70 ML/MIN/1.73M2
ESTIMATED AVERAGE GLUCOSE: 212 MG/DL
FRUCTOSAMINE SERPL-MCNC: 322 UMOL/L
GLUCOSE SERPL-MCNC: 240 MG/DL
HBA1C MFR BLD HPLC: 9 %
HDLC SERPL-MCNC: 51 MG/DL
LDLC SERPL CALC-MCNC: 67 MG/DL
NONHDLC SERPL-MCNC: 97 MG/DL
POTASSIUM SERPL-SCNC: 5 MMOL/L
PROT SERPL-MCNC: 6.8 G/DL
SODIUM SERPL-SCNC: 139 MMOL/L
TRIGL SERPL-MCNC: 183 MG/DL

## 2023-11-14 RX ORDER — ORAL SEMAGLUTIDE 7 MG/1
7 TABLET ORAL
Qty: 90 | Refills: 2 | Status: ACTIVE | COMMUNITY
Start: 2023-06-21 | End: 1900-01-01

## 2023-12-05 ENCOUNTER — RX RENEWAL (OUTPATIENT)
Age: 73
End: 2023-12-05

## 2023-12-05 RX ORDER — BLOOD SUGAR DIAGNOSTIC
STRIP MISCELLANEOUS TWICE DAILY
Qty: 100 | Refills: 7 | Status: ACTIVE | COMMUNITY
Start: 2020-07-16 | End: 1900-01-01

## 2024-02-28 NOTE — REVIEW OF SYSTEMS
Forms signed and faxed back.   [see HPI] : see HPI [Erectile Dysfunction] : erectile dysfunction [Negative] : Heme/Lymph

## 2024-03-11 ENCOUNTER — RX RENEWAL (OUTPATIENT)
Age: 74
End: 2024-03-11

## 2024-03-11 RX ORDER — ROSUVASTATIN CALCIUM 10 MG/1
10 TABLET, FILM COATED ORAL
Qty: 1 | Refills: 3 | Status: ACTIVE | COMMUNITY
Start: 2022-03-23 | End: 1900-01-01

## 2024-04-01 ENCOUNTER — APPOINTMENT (OUTPATIENT)
Dept: INTERNAL MEDICINE | Facility: CLINIC | Age: 74
End: 2024-04-01
Payer: MEDICARE

## 2024-04-01 VITALS
HEART RATE: 80 BPM | HEIGHT: 71 IN | SYSTOLIC BLOOD PRESSURE: 108 MMHG | DIASTOLIC BLOOD PRESSURE: 69 MMHG | WEIGHT: 189 LBS | RESPIRATION RATE: 17 BRPM | OXYGEN SATURATION: 97 % | TEMPERATURE: 96.2 F | BODY MASS INDEX: 26.46 KG/M2

## 2024-04-01 DIAGNOSIS — L40.50 ARTHROPATHIC PSORIASIS, UNSPECIFIED: ICD-10-CM

## 2024-04-01 DIAGNOSIS — E55.9 VITAMIN D DEFICIENCY, UNSPECIFIED: ICD-10-CM

## 2024-04-01 DIAGNOSIS — M06.9 RHEUMATOID ARTHRITIS, UNSPECIFIED: ICD-10-CM

## 2024-04-01 DIAGNOSIS — D18.01 HEMANGIOMA OF SKIN AND SUBCUTANEOUS TISSUE: ICD-10-CM

## 2024-04-01 DIAGNOSIS — E11.9 TYPE 2 DIABETES MELLITUS W/OUT COMPLICATIONS: ICD-10-CM

## 2024-04-01 DIAGNOSIS — K76.0 FATTY (CHANGE OF) LIVER, NOT ELSEWHERE CLASSIFIED: ICD-10-CM

## 2024-04-01 DIAGNOSIS — E78.5 HYPERLIPIDEMIA, UNSPECIFIED: ICD-10-CM

## 2024-04-01 DIAGNOSIS — D35.2 BENIGN NEOPLASM OF PITUITARY GLAND: ICD-10-CM

## 2024-04-01 DIAGNOSIS — I10 ESSENTIAL (PRIMARY) HYPERTENSION: ICD-10-CM

## 2024-04-01 PROCEDURE — 99214 OFFICE O/P EST MOD 30 MIN: CPT

## 2024-04-01 RX ORDER — GLIMEPIRIDE 2 MG/1
2 TABLET ORAL
Qty: 180 | Refills: 3 | Status: COMPLETED | COMMUNITY
Start: 2020-06-22 | End: 2024-04-01

## 2024-04-01 NOTE — HISTORY OF PRESENT ILLNESS
[FreeTextEntry1] : med follow up [de-identified] : Patient is a 73 y/o M w/ PMHx of HTN, DM, HLD, RA on Taltz (ixekizumab), fatty liver, who comes for med follow up. He denies any symptoms. He -denies any headaches, nausea, vomiting, fever, chills, sweats, chest pain, chest pressure, diarrhea, constipation, dysphagia, sour taste in the mouth, dizziness, leg swelling, leg pain, myalgias, arthralgias, itchy eyes, itchy ears, heartburn, or reflux.

## 2024-04-01 NOTE — ASSESSMENT
[FreeTextEntry1] : 0 BMI 27 ->23.5 - Patient lost 14 pounds from Rybelsus. Will continue.   1. DM - F/u A1c. Now on Rybelsus. Off of Glimepiride. GLYCEMIC INDEX: Foods can be measured by how much they are likely to raise blood sugar. This is called the glycemic index. We want you to eat mostly foods that have a low glycemic index. Fruit: choose cherries, grapefruit, prunes, dried apricots, apples, peaches, pears, blueberries, strawberries, oranges, and grapes. Breads and other starches: Choose pumpernickel, rye, sourdough, or stone-ground whole wheat bread. For cereal, choose bran flakes, oat flakes, and oatmeal. For rice, use long-grained white rice. Most pasta is fairly low on the glycemic index; whole wheat or egg pasta is the lowest. Choose new or sweet potatoes and yams. Dairy products: Dairy tends to be fairly low on the glycemic index because of the protein and fat in it. Premium ice cream is lower on the glycemic index than low-fat ice cream. Salty snacks: Hummus, peanuts, walnuts, and cashews are all good choices. Sweets: Most sweets are high on the glycemic index, so make them special treats only. Ones that have protein and fat in them tend to be a bit lower. Milk chocolate or peanut candies are good choices. Pound and sponge cakes are lower on the glycemic index than other types of cakes. For cookies, choose peanut butter, chocolate chip, butter, and oatmeal cookies. For a breakfast treat, choose scones or oatmeal muffins. Beans: Choose macrina, chickpeas (garbanzo beans), lentils, split peas, lima beans, and kidney beans. Meat and vegetables are low on the glycemic index. Soups and stews made with meat or vegetables are also good.    2. HTN - Making lifestyle changes is an important first step in the treatment of high blood pressure. In some patients, lowering sodium and alcohol intake, keeping weight in the ideal range, engaging in regular aerobic exercise, and stopping smoking can be sufficient to control high blood pressure. As an example, most professional societies suggest that sodium intake should be less than 2.3 grams (2300 milligrams [mg]) per day, which equals 6 grams or less of table salt. Such lifestyle changes can lower blood pressure as effectively as therapy with one blood pressure-lowering drug. (See "Patient education: High blood pressure, diet, and weight (Beyond the Basics)".)  However, many patients also require one or more medications to lower the blood pressure. Your doctor will help you decide whether you should start medication based on how high your blood pressure is, as well as your other health conditions and personal risk factors. The following is an overview of the different types of drugs that may initially be prescribed.   HIGH BLOOD PRESSURE MEDICATIONS There are various medications that are commonly used to treat high blood pressure.  Some people will respond well to one drug but not to another. Therefore, it may take time to determine the right drug(s) and proper dose to effectively lower blood pressure with a minimum of side effects.  Although generally well tolerated, high blood pressure medications can cause side effects; the side effects depend upon the specific drug given, dose, and other factors. Some side effects result from lowering of the blood pressure, usually if the blood pressure lowering is abrupt, and therefore can be caused by any high blood pressure medication. These include dizziness, drowsiness, lightheadedness, or feeling tired. They usually subside after a few weeks when the body has adapted to the lower blood pressure.  Diuretics Diuretics lower blood pressure mainly by causing the kidneys to excrete more sodium and water, which reduces fluid volume throughout the body and widens (dilates) blood vessels.  The diuretics used to treat high blood pressure are thiazides (chlorthalidone, hydrochlorothiazide, and indapamide). In some cases, a potassium supplement or a potassium-sparing diuretic (amiloride, spironolactone, or triamterene) are given in combination with a thiazide diuretic because the thiazides can cause potassium deficiency since increased amounts of potassium are excreted in the urine. Thiazide diuretics also cause a decrease in urinary calcium excretion, meaning that more calcium stays in the body. Because of this, they may be the preferred treatment for people with high blood pressure and osteoporosis (a common problem that causes weakening and thinning of the bones).  Side effects Side effects are uncommon with low doses of thiazide diuretics. Weakness, muscle cramps, and other symptoms can occur as a result of decreased sodium, potassium, and water level. Other symptoms may include reversible impotence and gout attacks.  ACE inhibitors Angiotensin-converting enzyme (ACE) inhibitors block production of the hormone, angiotensin II, a compound in the blood that causes narrowing of blood vessels and increases blood pressure. By reducing production of angiotensin II, ACE inhibitors allow blood vessels to widen, which lowers blood pressure and improves heart output.  The available ACE inhibitors include benazepril, captopril, enalapril, fosinopril, lisinopril, moexipril, perindopril, quinapril, ramipril, and trandolapril.  Side effects In some patients, ACE inhibitors cause a persistent dry hacking cough that is reversible when the medication is stopped. Less common side effects include dry mouth, nausea, rash, muscle pain, or, occasionally, kidney dysfunction and elevated blood potassium.  A potentially serious complication of ACE inhibitors is angioedema, which occurs in 0.1 to 0.7 percent of people. People with angioedema rapidly (minutes to hours after taking the medication) develop swelling of the lips, tongue, and throat, which can interfere with breathing. These symptoms are a medical emergency, and the ACE inhibitor should be discontinued.  Angiotensin II receptor blockers The angiotensin II receptor blockers (ARBs) block the effects of angiotensin II on cells in the heart and blood vessels. Similar to ACE inhibitors, ARBs can widen blood vessels, lower blood pressure, and improve heart output.  The available ARBs include azilsartan, candesartan, irbesartan, losartan, olmesartan, telmisartan, and valsartan.  Side effects The main difference between ARBs and ACE inhibitors is that ARBs do not produce cough. Some people who take ARBs experience headache, nausea, dry mouth, abdominal pain, or other side effects. Angioedema is less common with ARBs than with ACE inhibitors.  Calcium channel blockers Calcium channel blocker drugs reduce the amount of calcium that enters the smooth muscle in blood vessel walls and heart muscle. Muscle cells require calcium to contract. Thus, by inhibiting the flow of calcium across muscle cell membranes, calcium channel blockers cause muscle cells to relax and blood vessels to dilate, reducing blood pressure as well as reducing the force and rate of the heartbeat.  There are two major categories of calcium channel blockers:  ?Dihydropyridines, including amlodipine, felodipine, isradipine, nicardipine, nifedipine, and nisoldipine  ?Nondihydropyridines, including diltiazem and verapamil   Side effects The side effects of calcium channel blockers vary with the specific agent used. Patients who take dihydropyridines may develop headache, flushing, nausea, overgrowth of the gum tissue (gingival hyperplasia), or swelling of the extremities (peripheral edema).  Nondihydropyridines can occasionally cause the heart rate to slow too much. Other side effects may include headache and nausea with diltiazem or constipation with verapamil.  Beta blockers Beta blockers block some of the effects of the sympathetic nervous system, which increases the heart rate and raises blood pressure with stress and/or activity. Beta blockers lower blood pressure in part by decreasing the rate and force at which the heart pumps blood.  The available beta blockers include acebutolol, atenolol, betaxolol, bisoprolol, metoprolol, nadolol, nebivolol, pindolol, propranolol, and timolol.  Some beta blockers have combined activity, blocking both the beta and alpha receptors (see next section). These include labetalol and carvedilol.  Side effects Beta blockers may worsen symptoms of asthma, other lung diseases, or blood vessel disease outside the heart (such as peripheral vascular disease). As a result, they normally are not prescribed for patients with such conditions. (See "Patient education: Peripheral artery disease and claudication (Beyond the Basics)".)  In addition, beta blockers may mask symptoms of low blood sugar (hypoglycemia) in people with diabetes who are treated with insulin. Beta blockers can also cause fatigue, insomnia, strange dreams, a decreased ability to exercise, a slow heart rate, rash, and cold hands and feet due to reduced blood flow to the limbs.  Alpha blockers Alpha blockers relax or reduce the tone of involuntary (ie, smooth) muscle in the walls of blood vessels (vascular smooth muscle), allowing the vessels to widen, thereby lowering blood pressure. An increase in blood vessel diameter is known as "vasodilation." The available alpha blockers include doxazosin, prazosin, and terazosin.  Side effects Alpha blockers can cause dizziness, particularly when standing up, and particularly with the first few doses, low blood pressure when standing, or other side effects. They also may increase the risk of developing heart failure. For these reasons, they are not frequently used as a first-line treatment of primary hypertension (formerly called "essential" hypertension). A possible exception is in an older man with symptoms related to enlargement of the prostate;    3. HLD - to lower LDL and non HDL cholesterol levels 1 limit your intake of foods full of saturated fats , trans fats, and dietary cholesterol. Food with a lot of saturated fate include butter, fatty flesh like red meat, full fat and low fat dairy products , palm oil and coconut oil. If you see partially hydrogenated fat in the ingredient list of food label that food has trans fats. Top sources of dietary chol include egg yolks , organ meats and shell fish. one Type of fat omega 3 Fatty acids has been shown to protect against heart disease. Good sources are cold water fish like salmon, mackerel , halibut., trout herring and sardines. Limit your intake of meat , poultry and fish to no more than 3.5 ounces per day Eat a lot more fiber rich foods like beans , oats, barley fruits and vegetables. Food naturally rich in soluble fiber are good at lowering cholesterol. Excellent choices are oats , oat bran , barley , peas , yams sweet potatoes and legumes or beans. Good fruit sources are berries passion fruit, oranges pears, apricots , apples and nectarines. choose protein rich plant foods such as legumes or beans nuts and seeds over meat. Lose as much weight as possible and exercise Take plant sterol supplements.A Daily intake of 1-2 gms of plant sterols lowers ldl. Best choice is supplements such as Cholestoff by natures made because they dont have calories sugar trans fats an salt of many foods enriched with plant sterols. Take Metamucil or psyllium. Studies have shown 9-10 gms as daily of psyllium the equivalent of about 3 teaspoons of sugar free Metamucil reduced LDL levels CAT scans are the only radiological modality to identify abnormalities w/in the lungs with regards to nodules/masses/lymph nodes. Risks, benefits were reviewed in detail. The guidelines for abnormalities include follow up CT scans at various intervals which could range from 6 weeks to 1 year intervals. If there is a change for the worse then consideration for a biopsy will be considered if you are a candidate. Second opinion evaluation with a thoracic surgeon or an interventional radiologist could be offered. -Lung Cancer Screening is recommended for people between the ages of 55 and 80 with prior 30+ pack year smoking histories. There is irrefutable evidence for realization of lung cancer screening based on two large randomized control trials demonstrating relative reduction in lung cancer mortality for patients undergoing low-dose CT scanning. Risks and benefits reviewed with the patient.  4. Rheumatoid Arthritis - Pts that consume certain foods can worsen their symptoms of arthritis. The five worse foods are bagels, gluten, French fries and processed fast foods, trans fats such as soybean, sunflower oil, omega 6 , vegetable oil and corn oil. Blackened and barbecue foods Advanced glycogen enzymes , night shade vegetables such as tomatoes, peppers, potatoes , and sugars. Foods that help tart cherries, broccoli, papaya, olive oil probiotics nuts such as almonds, pineapple green tea. Papain breaks down protein and bromelin reduces inflammation tumeric destroys free radicals and devils claw reduces arthritis pain. as well as ginger extract , rutin , [place] yucca root, citrus bioflavonoids and Boswelia extract.  5. Vit. D deficiency - We discussed vit D and risks and understands the importance of taking the vitamin.. Vitamin D is a nutrient found in some foods that is needed for health and to maintain strong bones. It does so by helping the body absorb calcium (one of bones main building blocks) from food and supplements. People who get too little vitamin D may develop soft, thin, and brittle bones, a condition known as rickets in children and osteomalacia in adults.  Vitamin D is important to the body in many other ways as well. Muscles need it to move, for example, nerves need it to carry messages between the brain and every body part, and the immune system needs vitamin D to fight off invading bacteria and viruses. Together with calcium, vitamin D also helps protect older adults from osteoporosis. Vitamin D is found in cells throughout the body.  How much vitamin D do I need?  The amount of vitamin D you need each day depends on your age. Average daily recommended amounts from the Food and Nutrition Board (a national group of experts) for different ages are listed below in International Units (IU):   Life Stage  Recommended Amount   Birth to 12 months 400 IU Children 1-13 years 600 IU Teens 14-18 years 600 IU Adults 19-70 years 600 IU Adults 71 years and older 800 IU Pregnant and breastfeeding women 600 IU  What foods provide vitamin D?  Very few foods naturally have vitamin D. Fortified foods provide most of the vitamin D in American diets. Fatty fish such as salmon, tuna, and mackerel are among the best sources. Beef liver, cheese, and egg yolks provide small amounts. Mushrooms provide some vitamin D. In some mushrooms that are newly available in stores, the vitamin D content is being boosted by exposing these mushrooms to ultraviolet light. Almost all of the [place] milk supply is fortified with 400 IU of vitamin D per quart. But foods made from milk, like cheese and ice cream, are usually not fortified. Vitamin D is added to many breakfast cereals and to some brands of orange juice, yogurt, margarine, and soy beverages; check the labels.  Can I get vitamin D from the sun?  The body makes vitamin D when skin is directly exposed to the sun, and most people meet at least some of their vitamin D needs this way. Skin exposed to sunshine indoors through a window will not produce vitamin D. Cloudy days, shade, and having dark-colored skin also cut down on the amount of vitamin D the skin makes.  However, despite the importance of the sun to vitamin D synthesis, it is prudent to limit exposure of skin to sunlight in order to lower the risk for skin cancer. When out in the sun for more than a few minutes, wear protective clothing and apply sunscreen with an SPF (sun protection factor) of 8 or more. Tanning beds also cause the skin to make vitamin D, but pose similar risks for skin cancer.  People who avoid the sun or who cover their bodies with sunscreen or clothing should include good sources of vitamin D in their diets or take a supplement. Recommended intakes of vitamin D are set on the assumption of little sun exposure.  What kinds of vitamin D dietary supplements are available?  Vitamin D is found in supplements (and fortified foods) in two different forms: D2 (ergocalciferol) and D3 (cholecalciferol). Both increase vitamin D in the blood.  Am I getting enough vitamin D?  Because vitamin D can come from sun, food, and supplements, the best measure of ones vitamin D status is blood levels of a form known as 25-hydroxyvitamin D. Levels are described in either nanomoles per liter (nmol/L) or nanograms per milliliter (ng/mL), where 1 nmol/L = 0.4 ng/mL.  In general, levels below 30 nmol/L (12 ng/mL) are too low for bone or overall health, and levels above 125 nmol/L (50 ng/mL) are probably too high. Levels of 50 nmol/L or above (20 ng/mL or above) are sufficient for most people.  By these measures, some Americans are vitamin D deficient and almost no one has levels that are too high. In general, young people have higher blood levels of 25-hydroxyvitamin D than older people and males have higher levels than females. By race, non- blacks tend to have the lowest levels and non- whites the highest. The majority of Americans have blood levels lower than 75 nmol/L (30 ng/mL).  Certain other groups may not get enough vitamin D:  infants, because human milk is a poor source of the nutrient.  infants should be given a supplement of 400 IU of vitamin D each day. Older adults, because their skin doesnt make vitamin D when exposed to sunlight as efficiently as when they were young, and their kidneys are less able to convert vitamin D to its active form. People with dark skin, because their skin has less ability to produce vitamin D from the sun. People with disorders such as Crohns disease or celiac disease who dont handle fat properly, because vitamin D needs fat to be absorbed. Obese people, because their body fat binds to some vitamin D and prevents it from getting into the blood.  What happens if I dont get enough vitamin D?  People can become deficient in vitamin D because they dont consume enough or absorb enough from food, their exposure to sunlight is limited, or their kidneys cannot convert vitamin D to its active form in the body. In children, vitamin D deficiency causes rickets, a condition in which the bones become soft and bend. Its a rare disease but still occurs, especially among  infants and children. In adults, vitamin D deficiency leads to osteomalacia, causing bone pain and muscle weakness.  What are some effects of vitamin D on health?  Vitamin D is being studied for its possible connections to several diseases and medical problems, including diabetes, hypertension, and autoimmune conditions such as multiple sclerosis. Two of them discussed below are bone disorders and some types of cancer.  Bone disorders  As they get older, millions of people (mostly women, but men too) develop, or are at risk of, osteoporosis, condition in which bones become fragile and may fracture if one falls. It is one consequence of not getting enough calcium and vitamin D over the long term. Supplements of both vitamin D3 (at 700-800 IU/day) and calcium (500-1,200 mg/day) have been shown to reduce the risk of bone loss and fractures in elderly people aged 62-85 years. Men and women should talk with their healthcare providers about their needs for vitamin D (and calcium) as part of an overall plan to prevent or treat osteoporosis.  Cancer  Some studies suggest that vitamin D may protect against colon cancer and perhaps even cancers of the prostate and breast. But higher levels of vitamin D in the blood have also been linked to higher rates of pancreatic cancer. At this time, its too early to say whether low vitamin D status increases cancer risk and whether higher levels protect or even increase risk in some people.  Can vitamin D be harmful?  Yes, when amounts in the blood become too high. Signs of toxicity include nausea, vomiting, poor appetite, constipation, weakness, and weight loss. And by raising blood levels of calcium, too much vitamin D can cause confusion, disorientation, and problems with heart rhythm. Excess vitamin D can also damage the kidneys.  The upper limit for vitamin D is 1,000 to 1,500 IU/day for infants, 2,500 to 3,000 IU/day for children 1-8 years, and 4,000 IU/day for children 9 years and older, adults, and pregnant and lactating teens and women. Vitamin D toxicity almost always occurs from overuse of supplements. Excessive sun exposure doesnt cause vitamin D poisoning because the body limits the amount of this vitamin it produces.  Are there any interactions with vitamin D that I should know about?  Like most dietary supplements, vitamin D may interact or interfere with other medicines or supplements you might be taking. Here are several examples: Prednisone and other corticosteroid medicines to reduce inflammation impair how the body handles vitamin D, which leads to lower calcium absorption an  6. fatty liver - Reviewed the spectrum of disease, the risk of disease progression to developing cirrhosis and the associated complications. Explained the patient may develop liver cancer without cirrhosis and therefore should be under the yearly surveillance with an abdominal ultrasound. Taught back that the best treatment of fatty liver disease is diet and exercise. Discussed the present diet with the patient and recommended the avoidance of fatty foods and to follow a heart healthy diet. Also explained that weight loss may lead to an improvement in the overall underlying liver disease. Taught back the physiology of alcohol abstinence has a important contribution to liver health. Fibroscan done F0  7. Psoriasis = on Taltz. Skin improved Medicines applied to the skin  Many medications are available that can be rubbed onto the skin to treat psoriasis. Because psoriasis cannot be cured, continued use of medication is required to maintain improvement. For the best results, patients must use treatments as directed. Emollients  Keeping skin soft and moist can minimize itching and tenderness. Over-the-counter moisturizers such as petroleum jelly or thick creams may be recommended; these should be applied immediately after bathing or showering. Topical corticosteroids  Corticosteroids (sometimes called "steroids" but distinct from body building steroids) are applied to the skin to help to reduce inflammation. This is often done twice per day at the beginning of treatment. As a patient's psoriasis improves, a doctor may recommend decreasing the frequency of treatment.  These cortisone-type creams and ointments are available in a variety of strengths (potencies); the least potent are available without a prescription (eg, hydrocortisone <https://www.[employer]/contents/hydrocortisone-patient-drug-information?qbbiwFkr=8828&source=see_link> 1% cream) and are usually only effective in sensitive skin areas like the face and body folds. More potent formulations require a prescription. Many other forms of these medications (for example, gel, lotion, liquid, shampoo foam, and spray) are available. Some people with psoriasis prefer these forms of medication over creams or ointments.  Side effects can include thinning of the skin and stretch marks (particularly when applied to normal skin). These effects are most likely to occur when high-potency topical corticosteroids are used for long periods of time. It is important to use these medications properly to reduce the risk for these side effects. A patient who notices these effects should contact his or her doctor.   8. Pit adenoma - Stable. F/u MR in Aug 2024   9. RA = follows rheumatologist. on Taltz.

## 2024-04-01 NOTE — PHYSICAL EXAM
[No Acute Distress] : no acute distress [Well-Appearing] : well-appearing [Well Developed] : well developed [Well Nourished] : well nourished [Normal Sclera/Conjunctiva] : normal sclera/conjunctiva [EOMI] : extraocular movements intact [Conjunctiva] : the conjunctiva were normal in both eyes [PERRL] : pupils were equal in size, round, and reactive to light [EOM Intact] : extraocular movements were intact [Normal Outer Ear/Nose] : the outer ears and nose were normal in appearance [Normal Oropharynx] : the oropharynx was normal [Normal TMs] : both tympanic membranes were normal [Normal Nasal Mucosa] : the nasal mucosa was normal [Normal Appearance] : was normal in appearance [Neck Supple] : was supple [Rate ___] : at [unfilled] breaths per minute [Normal Rhythm/Effort] : normal respiratory rhythm and effort [Clear Bilaterally] : the lungs were clear to auscultation bilaterally [Normal to Percussion] : the lungs were normal to percussion [Normal Rate] : normal [Heart Rate ___] : [unfilled] bpm [Normal S1] : normal S1 [Normal S2] : normal S2 [No Murmur] : no murmurs heard [No Pitting Edema] : no pitting edema present [2+] : left 2+ [No Abnormalities] : the abdominal aorta was not enlarged and no bruit was heard [No Mass] : no masses were palpated [Soft, Nontender] : the abdomen was soft and nontender [No HSM] : no hepatosplenomegaly noted [Normal Kyphosis] : normal kyphosis [No Visual Abnormalities] : no visible abnormalities [Normal Lordosis] : normal lordosis [No Scoliosis] : no scoliosis [No Tenderness to Palpation] : no spine tenderness on palpation [No Masses] : no masses [Full ROM] : full ROM [No Pain with ROM] : no pain with motion in any direction [Intact] : all reflexes within normal limits bilaterally [Normal Station and Gait] : the gait and station were normal [Involuntary Movements] : no involuntary movements were seen [Normal Motor Tone] : the muscle tone was normal [Normal Scalp] : inspection of the scalp showed no abnormalities [Location: ___] : [unfilled] [Single Nodule ___ cm] : a single [unfilled] ~Ucm [Back] : on the back [Normal] : coordination was normal [Normal Mental Status] : the patient's orientation, memory, attention, language and fund of knowledge were normal [Appropriate] : appropriate [Comprehensive Foot Exam Normal] : Right and left foot were examined and both feet are normal. No ulcers in either foot. Toes are normal and with full ROM.  Normal tactile sensation with monofilament testing throughout both feet [5th Left ICS - MCL] : palpated at the 5th LICS in the midclavicular line [Rhythm Regular] : regular [Rt] : varicose veins of the right leg noted [Lt] : varicose veins of the left leg noted [Flat] : flat [Enlarged Diffusely] : was not enlarged [Right Carotid Bruit] : no bruit heard over the right carotid [Left Carotid Bruit] : no bruit heard over the left carotid [Right Femoral Bruit] : no bruit heard over the right femoral artery [Left Femoral Bruit] : no bruit heard over the left femoral artery [Cervical Lymph Nodes Enlarged Posterior Bilaterally] : nodes not enlarged [Axillary Lymph Nodes Enlarged Bilaterally] : nodes not enlarged [Supraclavicular Lymph Nodes Enlarged Bilaterally] : nodes not enlarged [Inguinal Lymph Nodes Enlarged Bilaterally] : nodes not enlarged [Abnormal Color] : normal color and pigmentation [Skin Lesions 1] : no skin lesions were observed [Impaired judgment] : intact judgment [Impaired Insight] : intact insight [de-identified] : Varicose veins [de-identified] : Sebaceous cyst in the back [de-identified] : Callus, Hammertoes

## 2024-04-01 NOTE — END OF VISIT
[FreeTextEntry3] : resident present [Time Spent: ___ minutes] : I have spent [unfilled] minutes of time on the encounter.

## 2024-04-01 NOTE — COUNSELING
[Fall prevention counseling provided] : Fall prevention counseling provided [Behavioral health counseling provided] : Behavioral health counseling provided [Potential consequences of obesity discussed] : Potential consequences of obesity discussed [Benefits of weight loss discussed] : Benefits of weight loss discussed [Encouraged to maintain food diary] : Encouraged to maintain food diary [Weigh Self Weekly] : weigh self weekly [Sleep ___ hours/day] : Sleep [unfilled] hours/day [Target Wt Loss Goal ___] : Weight Loss Goals: Target weight loss goal [unfilled] lbs [None] : None [Good understanding] : Patient has a good understanding of lifestyle changes and steps needed to achieve self management goal [FreeTextEntry2] : BMI 23.5, now within normal limits

## 2024-04-01 NOTE — HEALTH RISK ASSESSMENT
[No] : No [No falls in past year] : Patient reported no falls in the past year [0] : 1) Little interest or pleasure doing things: Not at all (0) [PHQ-2 Negative - No further assessment needed] : PHQ-2 Negative - No further assessment needed [Never] : Never [de-identified] : none [de-identified] : regular [[id number]] : 0

## 2024-04-02 LAB
ALBUMIN SERPL ELPH-MCNC: 4.4 G/DL
ALP BLD-CCNC: 122 U/L
ALT SERPL-CCNC: 20 U/L
ANION GAP SERPL CALC-SCNC: 12 MMOL/L
AST SERPL-CCNC: 19 U/L
BASOPHILS # BLD AUTO: 0.06 K/UL
BASOPHILS NFR BLD AUTO: 0.4 %
BILIRUB SERPL-MCNC: 0.3 MG/DL
BUN SERPL-MCNC: 18 MG/DL
CALCIUM SERPL-MCNC: 9.5 MG/DL
CHLORIDE SERPL-SCNC: 104 MMOL/L
CHOLEST SERPL-MCNC: 131 MG/DL
CO2 SERPL-SCNC: 26 MMOL/L
CREAT SERPL-MCNC: 1.22 MG/DL
CREAT SPEC-SCNC: 155 MG/DL
EGFR: 62 ML/MIN/1.73M2
EOSINOPHIL # BLD AUTO: 0.14 K/UL
EOSINOPHIL NFR BLD AUTO: 1 %
ESTIMATED AVERAGE GLUCOSE: 169 MG/DL
GLUCOSE SERPL-MCNC: 147 MG/DL
HBA1C MFR BLD HPLC: 7.5 %
HCT VFR BLD CALC: 47.9 %
HDLC SERPL-MCNC: 57 MG/DL
HGB BLD-MCNC: 14.9 G/DL
IMM GRANULOCYTES NFR BLD AUTO: 0.3 %
LDLC SERPL CALC-MCNC: 55 MG/DL
LYMPHOCYTES # BLD AUTO: 2.46 K/UL
LYMPHOCYTES NFR BLD AUTO: 18.2 %
MAN DIFF?: NORMAL
MCHC RBC-ENTMCNC: 28.7 PG
MCHC RBC-ENTMCNC: 31.1 GM/DL
MCV RBC AUTO: 92.1 FL
MICROALBUMIN 24H UR DL<=1MG/L-MCNC: 2.1 MG/DL
MICROALBUMIN/CREAT 24H UR-RTO: 14 MG/G
MONOCYTES # BLD AUTO: 1.06 K/UL
MONOCYTES NFR BLD AUTO: 7.9 %
NEUTROPHILS # BLD AUTO: 9.73 K/UL
NEUTROPHILS NFR BLD AUTO: 72.2 %
NONHDLC SERPL-MCNC: 74 MG/DL
PLATELET # BLD AUTO: 237 K/UL
POTASSIUM SERPL-SCNC: 5.3 MMOL/L
PROT SERPL-MCNC: 6.6 G/DL
PSA SERPL-MCNC: 2.91 NG/ML
RBC # BLD: 5.2 M/UL
RBC # FLD: 14 %
SODIUM SERPL-SCNC: 142 MMOL/L
TRIGL SERPL-MCNC: 105 MG/DL
WBC # FLD AUTO: 13.49 K/UL

## 2024-04-18 DIAGNOSIS — D72.9 DISORDER OF WHITE BLOOD CELLS, UNSPECIFIED: ICD-10-CM

## 2024-04-18 DIAGNOSIS — Z79.899 IMMUNODEFICIENCY DUE TO DRUGS: ICD-10-CM

## 2024-04-18 DIAGNOSIS — D84.821 IMMUNODEFICIENCY DUE TO DRUGS: ICD-10-CM

## 2024-04-18 LAB
BASOPHILS # BLD AUTO: 0.06 K/UL
BASOPHILS NFR BLD AUTO: 0.5 %
EOSINOPHIL # BLD AUTO: 0.16 K/UL
EOSINOPHIL NFR BLD AUTO: 1.2 %
HCT VFR BLD CALC: 48.4 %
HGB BLD-MCNC: 15.4 G/DL
IMM GRANULOCYTES NFR BLD AUTO: 0.3 %
LYMPHOCYTES # BLD AUTO: 2.76 K/UL
LYMPHOCYTES NFR BLD AUTO: 21.5 %
MAN DIFF?: NORMAL
MCHC RBC-ENTMCNC: 29.3 PG
MCHC RBC-ENTMCNC: 31.8 GM/DL
MCV RBC AUTO: 92 FL
MONOCYTES # BLD AUTO: 1.04 K/UL
MONOCYTES NFR BLD AUTO: 8.1 %
NEUTROPHILS # BLD AUTO: 8.75 K/UL
NEUTROPHILS NFR BLD AUTO: 68.4 %
PLATELET # BLD AUTO: 234 K/UL
RBC # BLD: 5.26 M/UL
RBC # FLD: 14.6 %
WBC # FLD AUTO: 12.81 K/UL

## 2024-04-27 ENCOUNTER — RX RENEWAL (OUTPATIENT)
Age: 74
End: 2024-04-27

## 2024-04-27 RX ORDER — CANDESARTAN CILEXETIL 4 MG/1
4 TABLET ORAL DAILY
Qty: 90 | Refills: 3 | Status: ACTIVE | COMMUNITY
Start: 2020-09-21 | End: 1900-01-01

## 2024-07-15 ENCOUNTER — APPOINTMENT (OUTPATIENT)
Dept: INTERNAL MEDICINE | Facility: CLINIC | Age: 74
End: 2024-07-15
Payer: MEDICARE

## 2024-07-15 ENCOUNTER — NON-APPOINTMENT (OUTPATIENT)
Age: 74
End: 2024-07-15

## 2024-07-15 VITALS
WEIGHT: 163 LBS | BODY MASS INDEX: 22.82 KG/M2 | SYSTOLIC BLOOD PRESSURE: 103 MMHG | HEIGHT: 71 IN | TEMPERATURE: 97.9 F | OXYGEN SATURATION: 96 % | RESPIRATION RATE: 16 BRPM | DIASTOLIC BLOOD PRESSURE: 60 MMHG | HEART RATE: 72 BPM

## 2024-07-15 DIAGNOSIS — Z79.899 IMMUNODEFICIENCY DUE TO DRUGS: ICD-10-CM

## 2024-07-15 DIAGNOSIS — D84.821 IMMUNODEFICIENCY DUE TO DRUGS: ICD-10-CM

## 2024-07-15 DIAGNOSIS — M06.9 RHEUMATOID ARTHRITIS, UNSPECIFIED: ICD-10-CM

## 2024-07-15 DIAGNOSIS — L40.50 ARTHROPATHIC PSORIASIS, UNSPECIFIED: ICD-10-CM

## 2024-07-15 DIAGNOSIS — Z78.9 OTHER SPECIFIED HEALTH STATUS: ICD-10-CM

## 2024-07-15 DIAGNOSIS — L72.3 SEBACEOUS CYST: ICD-10-CM

## 2024-07-15 DIAGNOSIS — E11.9 TYPE 2 DIABETES MELLITUS W/OUT COMPLICATIONS: ICD-10-CM

## 2024-07-15 DIAGNOSIS — D35.2 BENIGN NEOPLASM OF PITUITARY GLAND: ICD-10-CM

## 2024-07-15 DIAGNOSIS — I10 ESSENTIAL (PRIMARY) HYPERTENSION: ICD-10-CM

## 2024-07-15 DIAGNOSIS — E78.5 HYPERLIPIDEMIA, UNSPECIFIED: ICD-10-CM

## 2024-07-15 DIAGNOSIS — E78.2 MIXED HYPERLIPIDEMIA: ICD-10-CM

## 2024-07-15 DIAGNOSIS — Z71.85 ENCOUNTER FOR IMMUNIZATION SAFETY COUNSELING: ICD-10-CM

## 2024-07-15 DIAGNOSIS — K76.0 FATTY (CHANGE OF) LIVER, NOT ELSEWHERE CLASSIFIED: ICD-10-CM

## 2024-07-15 PROCEDURE — G0439: CPT

## 2024-07-15 PROCEDURE — 93000 ELECTROCARDIOGRAM COMPLETE: CPT

## 2024-07-15 RX ORDER — IXEKIZUMAB 80 MG/ML
80 INJECTION, SOLUTION SUBCUTANEOUS
Refills: 0 | Status: ACTIVE | COMMUNITY
Start: 2024-07-15

## 2024-07-16 LAB
ALBUMIN SERPL ELPH-MCNC: 4.6 G/DL
ALP BLD-CCNC: 104 U/L
ALT SERPL-CCNC: 17 U/L
ANION GAP SERPL CALC-SCNC: 13 MMOL/L
AST SERPL-CCNC: 20 U/L
BILIRUB DIRECT SERPL-MCNC: 0.1 MG/DL
BILIRUB INDIRECT SERPL-MCNC: 0.2 MG/DL
BILIRUB SERPL-MCNC: 0.3 MG/DL
BUN SERPL-MCNC: 21 MG/DL
CALCIUM SERPL-MCNC: 9.5 MG/DL
CHLORIDE SERPL-SCNC: 106 MMOL/L
CO2 SERPL-SCNC: 24 MMOL/L
CREAT SERPL-MCNC: 1.1 MG/DL
EGFR: 70 ML/MIN/1.73M2
ESTIMATED AVERAGE GLUCOSE: 146 MG/DL
FRUCTOSAMINE SERPL-MCNC: 244 UMOL/L
GLUCOSE SERPL-MCNC: 122 MG/DL
HBA1C MFR BLD HPLC: 6.7 %
POTASSIUM SERPL-SCNC: 4.8 MMOL/L
PROT SERPL-MCNC: 7 G/DL
SODIUM SERPL-SCNC: 143 MMOL/L

## 2024-08-05 ENCOUNTER — APPOINTMENT (OUTPATIENT)
Dept: MRI IMAGING | Facility: CLINIC | Age: 74
End: 2024-08-05

## 2024-08-05 PROCEDURE — 70553 MRI BRAIN STEM W/O & W/DYE: CPT | Mod: TC

## 2024-08-05 PROCEDURE — A9585: CPT

## 2024-09-02 ENCOUNTER — RX RENEWAL (OUTPATIENT)
Age: 74
End: 2024-09-02

## 2024-11-26 ENCOUNTER — APPOINTMENT (OUTPATIENT)
Dept: INTERNAL MEDICINE | Facility: CLINIC | Age: 74
End: 2024-11-26
Payer: MEDICARE

## 2024-11-26 VITALS
BODY MASS INDEX: 22.68 KG/M2 | TEMPERATURE: 97.2 F | HEART RATE: 70 BPM | WEIGHT: 162 LBS | OXYGEN SATURATION: 96 % | HEIGHT: 71 IN | DIASTOLIC BLOOD PRESSURE: 67 MMHG | SYSTOLIC BLOOD PRESSURE: 121 MMHG

## 2024-11-26 DIAGNOSIS — L40.50 ARTHROPATHIC PSORIASIS, UNSPECIFIED: ICD-10-CM

## 2024-11-26 DIAGNOSIS — E11.9 TYPE 2 DIABETES MELLITUS W/OUT COMPLICATIONS: ICD-10-CM

## 2024-11-26 DIAGNOSIS — R80.9 PROTEINURIA, UNSPECIFIED: ICD-10-CM

## 2024-11-26 DIAGNOSIS — E55.9 VITAMIN D DEFICIENCY, UNSPECIFIED: ICD-10-CM

## 2024-11-26 DIAGNOSIS — E53.8 DEFICIENCY OF OTHER SPECIFIED B GROUP VITAMINS: ICD-10-CM

## 2024-11-26 DIAGNOSIS — I10 ESSENTIAL (PRIMARY) HYPERTENSION: ICD-10-CM

## 2024-11-26 DIAGNOSIS — K76.0 FATTY (CHANGE OF) LIVER, NOT ELSEWHERE CLASSIFIED: ICD-10-CM

## 2024-11-26 DIAGNOSIS — Z79.899 IMMUNODEFICIENCY DUE TO DRUGS: ICD-10-CM

## 2024-11-26 DIAGNOSIS — R74.8 ABNORMAL LEVELS OF OTHER SERUM ENZYMES: ICD-10-CM

## 2024-11-26 DIAGNOSIS — D35.2 BENIGN NEOPLASM OF PITUITARY GLAND: ICD-10-CM

## 2024-11-26 DIAGNOSIS — E78.2 MIXED HYPERLIPIDEMIA: ICD-10-CM

## 2024-11-26 DIAGNOSIS — D84.821 IMMUNODEFICIENCY DUE TO DRUGS: ICD-10-CM

## 2024-11-26 PROCEDURE — 99213 OFFICE O/P EST LOW 20 MIN: CPT

## 2024-11-27 LAB
25(OH)D3 SERPL-MCNC: 34.1 NG/ML
ALBUMIN SERPL ELPH-MCNC: 4.9 G/DL
ALP BLD-CCNC: 110 U/L
ALT SERPL-CCNC: 19 U/L
ANION GAP SERPL CALC-SCNC: 13 MMOL/L
APPEARANCE: CLEAR
AST SERPL-CCNC: 19 U/L
BASOPHILS # BLD AUTO: 0.04 K/UL
BASOPHILS NFR BLD AUTO: 0.4 %
BILIRUB SERPL-MCNC: 0.4 MG/DL
BILIRUBIN URINE: NEGATIVE
BLOOD URINE: NEGATIVE
BUN SERPL-MCNC: 16 MG/DL
CALCIUM SERPL-MCNC: 9.9 MG/DL
CHLORIDE SERPL-SCNC: 107 MMOL/L
CHOLEST SERPL-MCNC: 144 MG/DL
CO2 SERPL-SCNC: 27 MMOL/L
COLOR: YELLOW
CREAT SERPL-MCNC: 1.17 MG/DL
CREAT SPEC-SCNC: 141 MG/DL
EGFR: 65 ML/MIN/1.73M2
EOSINOPHIL # BLD AUTO: 0.12 K/UL
EOSINOPHIL NFR BLD AUTO: 1.1 %
ESTIMATED AVERAGE GLUCOSE: 137 MG/DL
FOLATE SERPL-MCNC: 10.2 NG/ML
FRUCTOSAMINE SERPL-MCNC: 254 UMOL/L
GLUCOSE QUALITATIVE U: NEGATIVE MG/DL
GLUCOSE SERPL-MCNC: 128 MG/DL
HBA1C MFR BLD HPLC: 6.4 %
HCT VFR BLD CALC: 50 %
HDLC SERPL-MCNC: 59 MG/DL
HGB BLD-MCNC: 16.1 G/DL
IMM GRANULOCYTES NFR BLD AUTO: 0.3 %
KETONES URINE: NEGATIVE MG/DL
LDLC SERPL CALC-MCNC: 52 MG/DL
LEUKOCYTE ESTERASE URINE: NEGATIVE
LYMPHOCYTES # BLD AUTO: 2.29 K/UL
LYMPHOCYTES NFR BLD AUTO: 21.4 %
MAN DIFF?: NORMAL
MCHC RBC-ENTMCNC: 29.4 PG
MCHC RBC-ENTMCNC: 32.2 G/DL
MCV RBC AUTO: 91.4 FL
MICROALBUMIN 24H UR DL<=1MG/L-MCNC: 4.9 MG/DL
MICROALBUMIN/CREAT 24H UR-RTO: 35 MG/G
MONOCYTES # BLD AUTO: 0.78 K/UL
MONOCYTES NFR BLD AUTO: 7.3 %
NEUTROPHILS # BLD AUTO: 7.46 K/UL
NEUTROPHILS NFR BLD AUTO: 69.5 %
NITRITE URINE: NEGATIVE
NONHDLC SERPL-MCNC: 85 MG/DL
PH URINE: 5.5
PLATELET # BLD AUTO: 225 K/UL
POTASSIUM SERPL-SCNC: 5.1 MMOL/L
PROT SERPL-MCNC: 7.3 G/DL
PROTEIN URINE: NORMAL MG/DL
RBC # BLD: 5.47 M/UL
RBC # FLD: 14.4 %
SODIUM SERPL-SCNC: 147 MMOL/L
SPECIFIC GRAVITY URINE: 1.01
TRIGL SERPL-MCNC: 205 MG/DL
UROBILINOGEN URINE: 0.2 MG/DL
VIT B12 SERPL-MCNC: 430 PG/ML
WBC # FLD AUTO: 10.72 K/UL

## 2025-01-21 ENCOUNTER — APPOINTMENT (OUTPATIENT)
Dept: ULTRASOUND IMAGING | Facility: CLINIC | Age: 75
End: 2025-01-21
Payer: MEDICARE

## 2025-01-21 PROCEDURE — 76700 US EXAM ABDOM COMPLETE: CPT

## 2025-04-10 ENCOUNTER — NON-APPOINTMENT (OUTPATIENT)
Age: 75
End: 2025-04-10

## 2025-04-12 ENCOUNTER — NON-APPOINTMENT (OUTPATIENT)
Age: 75
End: 2025-04-12

## 2025-04-14 ENCOUNTER — APPOINTMENT (OUTPATIENT)
Dept: INTERNAL MEDICINE | Facility: CLINIC | Age: 75
End: 2025-04-14
Payer: MEDICARE

## 2025-04-14 VITALS
TEMPERATURE: 97.2 F | BODY MASS INDEX: 22.96 KG/M2 | DIASTOLIC BLOOD PRESSURE: 64 MMHG | HEART RATE: 63 BPM | HEIGHT: 71 IN | OXYGEN SATURATION: 96 % | WEIGHT: 164 LBS | SYSTOLIC BLOOD PRESSURE: 116 MMHG

## 2025-04-14 DIAGNOSIS — E11.9 TYPE 2 DIABETES MELLITUS W/OUT COMPLICATIONS: ICD-10-CM

## 2025-04-14 DIAGNOSIS — I10 ESSENTIAL (PRIMARY) HYPERTENSION: ICD-10-CM

## 2025-04-14 DIAGNOSIS — L72.3 SEBACEOUS CYST: ICD-10-CM

## 2025-04-14 DIAGNOSIS — Z79.899 IMMUNODEFICIENCY DUE TO DRUGS: ICD-10-CM

## 2025-04-14 DIAGNOSIS — L40.50 ARTHROPATHIC PSORIASIS, UNSPECIFIED: ICD-10-CM

## 2025-04-14 DIAGNOSIS — E53.8 DEFICIENCY OF OTHER SPECIFIED B GROUP VITAMINS: ICD-10-CM

## 2025-04-14 DIAGNOSIS — M85.80 OTHER SPECIFIED DISORDERS OF BONE DENSITY AND STRUCTURE, UNSPECIFIED SITE: ICD-10-CM

## 2025-04-14 DIAGNOSIS — I73.00 RAYNAUD'S SYNDROME W/OUT GANGRENE: ICD-10-CM

## 2025-04-14 DIAGNOSIS — K76.0 FATTY (CHANGE OF) LIVER, NOT ELSEWHERE CLASSIFIED: ICD-10-CM

## 2025-04-14 DIAGNOSIS — M06.9 RHEUMATOID ARTHRITIS, UNSPECIFIED: ICD-10-CM

## 2025-04-14 DIAGNOSIS — D35.2 BENIGN NEOPLASM OF PITUITARY GLAND: ICD-10-CM

## 2025-04-14 DIAGNOSIS — E55.9 VITAMIN D DEFICIENCY, UNSPECIFIED: ICD-10-CM

## 2025-04-14 DIAGNOSIS — E78.2 MIXED HYPERLIPIDEMIA: ICD-10-CM

## 2025-04-14 DIAGNOSIS — D84.821 IMMUNODEFICIENCY DUE TO DRUGS: ICD-10-CM

## 2025-04-14 DIAGNOSIS — R80.9 PROTEINURIA, UNSPECIFIED: ICD-10-CM

## 2025-04-14 PROCEDURE — 99215 OFFICE O/P EST HI 40 MIN: CPT

## 2025-04-15 ENCOUNTER — RX RENEWAL (OUTPATIENT)
Age: 75
End: 2025-04-15

## 2025-04-15 LAB
25(OH)D3 SERPL-MCNC: 23 NG/ML
ALBUMIN SERPL ELPH-MCNC: 4.4 G/DL
ALP BLD-CCNC: 106 U/L
ALT SERPL-CCNC: 18 U/L
ANION GAP SERPL CALC-SCNC: 14 MMOL/L
APPEARANCE: CLEAR
AST SERPL-CCNC: 18 U/L
BASOPHILS # BLD AUTO: 0.05 K/UL
BASOPHILS NFR BLD AUTO: 0.5 %
BILIRUB SERPL-MCNC: 0.3 MG/DL
BILIRUBIN URINE: NEGATIVE
BLOOD URINE: NEGATIVE
BUN SERPL-MCNC: 21 MG/DL
CALCIUM SERPL-MCNC: 9.5 MG/DL
CHLORIDE SERPL-SCNC: 105 MMOL/L
CHOLEST SERPL-MCNC: 151 MG/DL
CO2 SERPL-SCNC: 26 MMOL/L
COLOR: YELLOW
CREAT SERPL-MCNC: 1.2 MG/DL
CREAT SPEC-SCNC: 195 MG/DL
EGFRCR SERPLBLD CKD-EPI 2021: 63 ML/MIN/1.73M2
EOSINOPHIL # BLD AUTO: 0.13 K/UL
EOSINOPHIL NFR BLD AUTO: 1.4 %
ESTIMATED AVERAGE GLUCOSE: 148 MG/DL
FOLATE SERPL-MCNC: 4.7 NG/ML
FRUCTOSAMINE SERPL-MCNC: 238 UMOL/L
GLUCOSE QUALITATIVE U: NEGATIVE MG/DL
GLUCOSE SERPL-MCNC: 116 MG/DL
HBA1C MFR BLD HPLC: 6.8 %
HCT VFR BLD CALC: 48 %
HDLC SERPL-MCNC: 60 MG/DL
HGB BLD-MCNC: 15.1 G/DL
IMM GRANULOCYTES NFR BLD AUTO: 0.3 %
KETONES URINE: NEGATIVE MG/DL
LDLC SERPL-MCNC: 71 MG/DL
LEUKOCYTE ESTERASE URINE: NEGATIVE
LYMPHOCYTES # BLD AUTO: 2.11 K/UL
LYMPHOCYTES NFR BLD AUTO: 21.9 %
MAN DIFF?: NORMAL
MCHC RBC-ENTMCNC: 30.2 PG
MCHC RBC-ENTMCNC: 31.5 G/DL
MCV RBC AUTO: 96 FL
MICROALBUMIN 24H UR DL<=1MG/L-MCNC: 1.4 MG/DL
MICROALBUMIN/CREAT 24H UR-RTO: 7 MG/G
MONOCYTES # BLD AUTO: 0.74 K/UL
MONOCYTES NFR BLD AUTO: 7.7 %
NEUTROPHILS # BLD AUTO: 6.56 K/UL
NEUTROPHILS NFR BLD AUTO: 68.2 %
NITRITE URINE: NEGATIVE
NONHDLC SERPL-MCNC: 91 MG/DL
PH URINE: 5.5
PLATELET # BLD AUTO: 196 K/UL
POTASSIUM SERPL-SCNC: 4.2 MMOL/L
PROT SERPL-MCNC: 6.7 G/DL
PROTEIN URINE: NEGATIVE MG/DL
RBC # BLD: 5 M/UL
RBC # FLD: 14.1 %
SODIUM SERPL-SCNC: 146 MMOL/L
SPECIFIC GRAVITY URINE: 1.03
TRIGL SERPL-MCNC: 111 MG/DL
UROBILINOGEN URINE: 0.2 MG/DL
VIT B12 SERPL-MCNC: 361 PG/ML
WBC # FLD AUTO: 9.62 K/UL

## 2025-05-24 ENCOUNTER — RX RENEWAL (OUTPATIENT)
Age: 75
End: 2025-05-24

## 2025-07-12 ENCOUNTER — NON-APPOINTMENT (OUTPATIENT)
Age: 75
End: 2025-07-12

## 2025-07-16 ENCOUNTER — APPOINTMENT (OUTPATIENT)
Dept: INTERNAL MEDICINE | Facility: CLINIC | Age: 75
End: 2025-07-16
Payer: MEDICARE

## 2025-07-16 VITALS
WEIGHT: 163 LBS | BODY MASS INDEX: 22.82 KG/M2 | HEART RATE: 64 BPM | SYSTOLIC BLOOD PRESSURE: 99 MMHG | OXYGEN SATURATION: 97 % | DIASTOLIC BLOOD PRESSURE: 65 MMHG | TEMPERATURE: 97.3 F | HEIGHT: 71 IN

## 2025-07-16 VITALS — SYSTOLIC BLOOD PRESSURE: 108 MMHG | DIASTOLIC BLOOD PRESSURE: 68 MMHG

## 2025-07-16 PROBLEM — M79.606: Status: ACTIVE | Noted: 2025-07-16

## 2025-07-16 PROCEDURE — 96372 THER/PROPH/DIAG INJ SC/IM: CPT

## 2025-07-16 PROCEDURE — G0439: CPT

## 2025-07-16 RX ORDER — CHOLECALCIFEROL (VITAMIN D3) 1250 MCG
1.25 MG CAPSULE ORAL
Qty: 12 | Refills: 0 | Status: ACTIVE | COMMUNITY
Start: 2025-07-16 | End: 1900-01-01

## 2025-07-16 RX ORDER — CYANOCOBALAMIN 1000 UG/ML
1000 INJECTION, SOLUTION INTRAMUSCULAR; SUBCUTANEOUS
Qty: 0 | Refills: 0 | Status: COMPLETED | OUTPATIENT
Start: 2025-07-16

## 2025-07-16 RX ADMIN — CYANOCOBALAMIN 0 MCG/ML: 1000 INJECTION, SOLUTION INTRAMUSCULAR; SUBCUTANEOUS at 00:00

## 2025-07-17 PROBLEM — E87.5 HYPERKALEMIA: Status: ACTIVE | Noted: 2025-07-17

## 2025-07-17 LAB
ALBUMIN SERPL ELPH-MCNC: 4.6 G/DL
ALBUMIN, RANDOM URINE: 2 MG/DL
ALP BLD-CCNC: 108 U/L
ALT SERPL-CCNC: 24 U/L
ANION GAP SERPL CALC-SCNC: 19 MMOL/L
APPEARANCE: CLEAR
AST SERPL-CCNC: 23 U/L
BASOPHILS # BLD AUTO: 0.05 K/UL
BASOPHILS NFR BLD AUTO: 0.6 %
BILIRUB SERPL-MCNC: 0.6 MG/DL
BILIRUBIN URINE: NEGATIVE
BLOOD URINE: NEGATIVE
BUN SERPL-MCNC: 16 MG/DL
CALCIUM SERPL-MCNC: 9.8 MG/DL
CHLORIDE SERPL-SCNC: 106 MMOL/L
CHOLEST SERPL-MCNC: 145 MG/DL
CO2 SERPL-SCNC: 23 MMOL/L
COLOR: YELLOW
CREAT SERPL-MCNC: 1.14 MG/DL
CREAT SPEC-SCNC: 184 MG/DL
EGFRCR SERPLBLD CKD-EPI 2021: 67 ML/MIN/1.73M2
EOSINOPHIL # BLD AUTO: 0.09 K/UL
EOSINOPHIL NFR BLD AUTO: 1.1 %
ESTIMATED AVERAGE GLUCOSE: 151 MG/DL
FRUCTOSAMINE SERPL-MCNC: 258 UMOL/L
GLUCOSE QUALITATIVE U: NEGATIVE MG/DL
GLUCOSE SERPL-MCNC: 89 MG/DL
HBA1C MFR BLD HPLC: 6.9 %
HCT VFR BLD CALC: 48.1 %
HDLC SERPL-MCNC: 58 MG/DL
HGB BLD-MCNC: 14.8 G/DL
IMM GRANULOCYTES NFR BLD AUTO: 0.2 %
KETONES URINE: NEGATIVE MG/DL
LDLC SERPL-MCNC: 64 MG/DL
LEUKOCYTE ESTERASE URINE: NEGATIVE
LYMPHOCYTES # BLD AUTO: 2.12 K/UL
LYMPHOCYTES NFR BLD AUTO: 24.9 %
MAN DIFF?: NORMAL
MCHC RBC-ENTMCNC: 29.2 PG
MCHC RBC-ENTMCNC: 30.8 G/DL
MCV RBC AUTO: 95.1 FL
MICROALBUMIN/CREAT 24H UR-RTO: 11 MG/G
MONOCYTES # BLD AUTO: 0.62 K/UL
MONOCYTES NFR BLD AUTO: 7.3 %
NEUTROPHILS # BLD AUTO: 5.62 K/UL
NEUTROPHILS NFR BLD AUTO: 65.9 %
NITRITE URINE: NEGATIVE
NONHDLC SERPL-MCNC: 87 MG/DL
PH URINE: 5.5
PLATELET # BLD AUTO: 192 K/UL
POTASSIUM SERPL-SCNC: 5.6 MMOL/L
PROT SERPL-MCNC: 6.9 G/DL
PROTEIN URINE: NEGATIVE MG/DL
PSA SERPL-MCNC: 3 NG/ML
RBC # BLD: 5.06 M/UL
RBC # FLD: 14.6 %
SODIUM SERPL-SCNC: 148 MMOL/L
SPECIFIC GRAVITY URINE: 1.02
TRIGL SERPL-MCNC: 130 MG/DL
UROBILINOGEN URINE: 0.2 MG/DL
WBC # FLD AUTO: 8.52 K/UL

## 2025-08-08 ENCOUNTER — APPOINTMENT (OUTPATIENT)
Dept: VASCULAR SURGERY | Facility: CLINIC | Age: 75
End: 2025-08-08
Payer: MEDICARE

## 2025-08-08 VITALS — WEIGHT: 165 LBS | BODY MASS INDEX: 23.1 KG/M2 | HEIGHT: 71 IN

## 2025-08-08 PROCEDURE — 93924 LWR XTR VASC STDY BILAT: CPT

## 2025-08-08 PROCEDURE — 99204 OFFICE O/P NEW MOD 45 MIN: CPT

## 2025-09-08 ENCOUNTER — APPOINTMENT (OUTPATIENT)
Dept: UROLOGY | Facility: CLINIC | Age: 75
End: 2025-09-08
Payer: MEDICARE

## 2025-09-08 VITALS
TEMPERATURE: 97.3 F | DIASTOLIC BLOOD PRESSURE: 70 MMHG | HEART RATE: 58 BPM | OXYGEN SATURATION: 96 % | SYSTOLIC BLOOD PRESSURE: 110 MMHG

## 2025-09-08 DIAGNOSIS — R97.20 ELEVATED PROSTATE, SPECIFIC ANTIGEN [PSA]: ICD-10-CM

## 2025-09-08 PROCEDURE — 99203 OFFICE O/P NEW LOW 30 MIN: CPT
